# Patient Record
Sex: MALE | Race: WHITE | NOT HISPANIC OR LATINO | Employment: OTHER | URBAN - METROPOLITAN AREA
[De-identification: names, ages, dates, MRNs, and addresses within clinical notes are randomized per-mention and may not be internally consistent; named-entity substitution may affect disease eponyms.]

---

## 2018-01-15 ENCOUNTER — OFFICE VISIT (OUTPATIENT)
Dept: CARDIOLOGY | Facility: MEDICAL CENTER | Age: 58
End: 2018-01-15
Payer: MEDICARE

## 2018-01-15 VITALS
HEART RATE: 80 BPM | WEIGHT: 171 LBS | SYSTOLIC BLOOD PRESSURE: 154 MMHG | RESPIRATION RATE: 12 BRPM | BODY MASS INDEX: 25.91 KG/M2 | DIASTOLIC BLOOD PRESSURE: 80 MMHG | HEIGHT: 68 IN | OXYGEN SATURATION: 98 %

## 2018-01-15 DIAGNOSIS — Z72.0 TOBACCO USE: ICD-10-CM

## 2018-01-15 DIAGNOSIS — Z95.5 S/P CORONARY ARTERY STENT PLACEMENT: ICD-10-CM

## 2018-01-15 DIAGNOSIS — I73.9 PERIPHERAL VASCULAR DISEASE (HCC): ICD-10-CM

## 2018-01-15 DIAGNOSIS — M79.10 MYALGIA DUE TO STATIN: ICD-10-CM

## 2018-01-15 DIAGNOSIS — T46.6X5A MYALGIA DUE TO STATIN: ICD-10-CM

## 2018-01-15 DIAGNOSIS — E78.5 DYSLIPIDEMIA: ICD-10-CM

## 2018-01-15 DIAGNOSIS — R07.89 OTHER CHEST PAIN: ICD-10-CM

## 2018-01-15 DIAGNOSIS — R07.2 PRECORDIAL PAIN: ICD-10-CM

## 2018-01-15 DIAGNOSIS — Z78.9 STATIN INTOLERANCE: ICD-10-CM

## 2018-01-15 DIAGNOSIS — R42 LIGHTHEADED: ICD-10-CM

## 2018-01-15 DIAGNOSIS — I25.10 CORONARY ARTERY DISEASE, OCCLUSIVE: ICD-10-CM

## 2018-01-15 LAB — EKG IMPRESSION: NORMAL

## 2018-01-15 PROCEDURE — 93000 ELECTROCARDIOGRAM COMPLETE: CPT | Performed by: INTERNAL MEDICINE

## 2018-01-15 PROCEDURE — 99204 OFFICE O/P NEW MOD 45 MIN: CPT | Mod: 25 | Performed by: INTERNAL MEDICINE

## 2018-01-16 PROBLEM — Z72.0 TOBACCO USE: Status: ACTIVE | Noted: 2018-01-16

## 2018-01-16 PROBLEM — Z95.5 S/P CORONARY ARTERY STENT PLACEMENT: Status: ACTIVE | Noted: 2018-01-16

## 2018-01-16 PROBLEM — R07.2 PRECORDIAL PAIN: Status: ACTIVE | Noted: 2018-01-16

## 2018-01-16 PROBLEM — I73.9 PERIPHERAL VASCULAR DISEASE (HCC): Status: ACTIVE | Noted: 2018-01-16

## 2018-01-16 PROBLEM — T46.6X5A MYALGIA DUE TO STATIN: Status: ACTIVE | Noted: 2018-01-16

## 2018-01-16 PROBLEM — Z78.9 STATIN INTOLERANCE: Status: ACTIVE | Noted: 2018-01-16

## 2018-01-16 PROBLEM — M79.10 MYALGIA DUE TO STATIN: Status: ACTIVE | Noted: 2018-01-16

## 2018-01-16 PROBLEM — I25.10 CORONARY ARTERY DISEASE, OCCLUSIVE: Status: ACTIVE | Noted: 2018-01-16

## 2018-01-16 ASSESSMENT — ENCOUNTER SYMPTOMS
DIZZINESS: 1
DIAPHORESIS: 1
FEVER: 0
WEAKNESS: 1
EYE PAIN: 1
SHORTNESS OF BREATH: 0
PND: 0
BLURRED VISION: 1
ORTHOPNEA: 0
INSOMNIA: 0
PHOTOPHOBIA: 1
LOSS OF CONSCIOUSNESS: 0
CLAUDICATION: 1
MYALGIAS: 0
PALPITATIONS: 1
CHILLS: 0
DEPRESSION: 1
ABDOMINAL PAIN: 0

## 2018-01-16 NOTE — PROGRESS NOTES
"Subjective:   Eb Poon is a 57 y.o. male who presents today self-referred for ongoing management of coronary artery disease.    The patient has a complex cardiovascular history in addition to multiple medical problems including coronary artery disease, previous myocardial infarction, multiple repeated percutaneous coronary interventions with multiple stents involving the left anterior descending artery and dominant circumflex artery for both progressive native coronary artery disease and in-stent restenosis, dyslipidemia, intolerance to statins due to myalgias, chronic tobacco use, insulin-dependent diabetes mellitus, labile hypertension and peripheral vascular disease requiring multiple peripheral vascular intervention including bilateral common iliac artery stents and apparently infra inguinal interventions who has been under the care of Dr. Christian shea and Dr. Tejas Rushing at [facility] for his multiple coronary and peripheral vascular interventions.    The patient wished to change cardiologists for his ongoing care.    The patient most recent cardiac catheterization was performed on 10/9/2017 by Dr. Tejas GARSIA for \"class IV angina pectoris\" which demonstrated widely patent previous stents in the proximal to distal left anterior descending artery and proximal distal dominant circumflex artery, chronic total occlusion of the circumflex posterior descending artery filled by collateral circulation from the LAD septal branches and apical branches of the 2nd marginal branch,, a \"small heavily diseased 1st diagonal branch 95-99% ostial stenosis followed by an 80-90% proximal stenosis and a nondominant right coronary artery with distal 90% stenosis and a left ventricular ejection fraction of 45-50% with basal inferior wall hypokinesis.    Echocardiogram on 11/30/2017 showing a left ventricular ejection fraction of 45-50% with basal anterior wall hypokinesis and basal inferior wall " hypokinesis.    The patient reports that at his last clinic follow up with Dr. Rushing he complained of side effects from his medications and states that Ranexa, Coreg and atorvastatin were discontinued and he only continues on his insulin, aspirin and Plavix.    The patient complains of constant anterior precordial discomfort lasting hours.  No heart failure symptoms.  Has some claudication.    Continues active cigarette smoking despite repeated counseling by his previous cardiologist for smoking cessation.    Past Medical History:   Diagnosis Date   • Arrhythmia    • Arthritis    • CAD (coronary artery disease)    • DIABETES MELLITUS    • Dizziness    • Fatigue    • Gout    • Heart disease    • High cholesterol    • Hypertension    • Myocardial infarct    • Other and unspecified angina pectoris    • PAD (peripheral artery disease)    • Pain     back pain     Past Surgical History:   Procedure Laterality Date   • RECOVERY  2015    Procedure: VASCULAR CASE BYNUM-ABDOMINAL AORTOGRAM W/ RUNOFF, POSSIBLE INTERVENTION ICD 9: 433.10, 435.3, 443.9;  Surgeon: [facility];  Location: SURGERY PRE-POST [facility] UNIT [facility];  Service:    • CAROTID ENDARTERECTOMY Left 2015    Procedure: CAROTID ENDARTERECTOMY ;  Surgeon: Topher Bynum M.D.;  Location: SURGERY [facility];  Service:    • OTHER CARDIAC SURGERY      stent placement     No family history on file.  History   Smoking Status   • Current Every Day Smoker   • Packs/day: 0.50   • Years: 40.00   • Types: Cigarettes   Smokeless Tobacco   • Never Used     Allergies   Allergen Reactions   • Coreg [Carvedilol]      Made heart rate low and worsened lightedheadness.   • Ranexa [Ranolazine]      Dropped heart rate low and worsened lightheadedness.    • Statins [Hmg-Coa-R Inhibitors]      Muscle aches.     Outpatient Encounter Prescriptions as of 1/15/2018   Medication Sig Dispense Refill   • [] Evolocumab, REPATHA, 140 MG/ML Solution Auto-injector Inject  "1 Each as instructed Once for 1 dose. 1 Each 0   • clopidogrel (PLAVIX) 75 MG TABS Take 1 Tab by mouth every day. 30 Tab 3   • diazepam (VALIUM) 2 MG TABS Take 1 Tab by mouth every 6 hours as needed for Anxiety. (Patient taking differently: Take 1 mg by mouth as needed for Anxiety.) 30 Tab 0   • multivitamin (THERAGRAN) TABS Take 1 Tab by mouth every day.     • aspirin (ASA) 81 MG CHEW chewable tablet Take 81 mg by mouth every day. 180 Tab 3   • lisinopril (PRINIVIL) 10 MG TABS Take 1 Tab by mouth every day. (Patient not taking: Reported on 1/15/2018) 30 Tab 11   • metoprolol SR (TOPROL XL) 25 MG TB24 Take 1 Tab by mouth every day. (Patient not taking: Reported on 1/15/2018) 30 Tab 11   • atorvastatin (LIPITOR) 40 MG TABS Take 1 Tab by mouth every day. (Patient not taking: Reported on 1/15/2018) 30 Tab 11   • insulin glargine (LANTUS) 100 UNIT/ML SOLN Inject 15 Units as instructed every day. (Patient not taking: Reported on 1/15/2018) 10 mL 3     No facility-administered encounter medications on file as of 1/15/2018.      Review of Systems   Constitutional: Positive for diaphoresis and malaise/fatigue. Negative for chills and fever.   HENT: Negative for congestion.    Eyes: Positive for blurred vision, photophobia and pain.   Respiratory: Negative for shortness of breath.    Cardiovascular: Positive for chest pain, palpitations and claudication. Negative for orthopnea, leg swelling and PND.   Gastrointestinal: Negative for abdominal pain.   Genitourinary: Negative for dysuria.   Musculoskeletal: Negative for joint pain and myalgias.   Skin: Negative for rash.   Neurological: Positive for dizziness and weakness. Negative for loss of consciousness.   Psychiatric/Behavioral: Positive for depression. The patient does not have insomnia.         Objective:   /80   Pulse 80   Resp 12   Ht 1.727 m (5' 8\")   Wt 77.6 kg (171 lb)   SpO2 98%   BMI 26.00 kg/m²     Physical Exam   Constitutional: He is oriented to " person, place, and time. He appears well-nourished. No distress.   HENT:   Head: Normocephalic and atraumatic.   Eyes: Conjunctivae and EOM are normal. Pupils are equal, round, and reactive to light. No scleral icterus.   Neck: Carotid bruit is not present. No thyromegaly present.   Normal jugular venous pressure.  Left neck scar.   Cardiovascular: Normal rate, regular rhythm, S1 normal and S2 normal.  Exam reveals no gallop and no friction rub.    No murmur heard.  Pulses:       Carotid pulses are 2+ on the right side, and 2+ on the left side with bruit.       Radial pulses are 2+ on the right side, and 2+ on the left side.        Femoral pulses are 1+ on the right side with bruit, and 1+ on the left side.       Posterior tibial pulses are 1+ on the right side, and 1+ on the left side.       Pulmonary/Chest: Effort normal and breath sounds normal. He has no wheezes. He has no rhonchi. He has no rales.   Abdominal: Soft. Bowel sounds are normal. He exhibits no abdominal bruit, no pulsatile midline mass and no mass. There is no hepatosplenomegaly. There is no tenderness.   Musculoskeletal: He exhibits no edema.   Lymphadenopathy:     He has no cervical adenopathy.   Neurological: He is alert and oriented to person, place, and time. He has normal strength. Gait normal.   Skin: Skin is warm and dry. No cyanosis. Nails show no clubbing.   Psychiatric: He has a normal mood and affect. His behavior is normal.     01/15/2018 EKG: Normal sinus rhythm, rate 73. Within normal limits. Reviewed myself.    Recent chemistry panel and lipid panel reviewed and are significantly abnormal    Assessment:     1. Precordial pain     2. Coronary artery disease, occlusive     3. S/P coronary artery stent placement     4. Dyslipidemia     5. Peripheral vascular disease (CMS-HCC)     6. Statin intolerance     7. Myalgia due to statin     8. Tobacco use     9. Other chest pain  EKG   10. Lightheaded  EKG       Medical Decision Making:   Today's Assessment / Status / Plan:     Precordial chest pain. Nonischemic.    Coronary artery disease. Complex. Preserved left ventricular function without congestive heart failure    PCI's. Multiple involving the left anterior descending artery and dominant circumflex artery with known chronic total occlusion of the distal circumflex artery.    Dyslipidemia. Statin intolerance. Not a cold.    Myalgias secondary to statins.    Chronic tobacco use.    Peripheral vascular disease with multiple interventions.    Recommendation and Discussion  I had a lengthy and detailed discussion with the patient and his wife who is also present with regards to the significance of his current coronary artery disease and current clinical condition.  I counseled and indicated that it was imperative that he abstain from all smoking and offered Chantix which he refused.  I will order PCSK 9 therapy with Repatha ASAP and subsequently attempt to initiate low dose Crestor 5 mg daily in order to achieve maximum treatment for his hyperlipidemia.  It is also critical that has diabetes  His blood pressure is elevated today but he states that his blood pressure was always been 120.  He was instructed to keep a daily blood pressure log and bring it back at his next appointment.  It was agreed that he would restart metoprolol 25 mg ER daily.  I will investigate referral for cardiac rehabilitation.  Follow-up 3 weeks.  Spent 45 minutes in face-to-face patient contact in which greater than 50% of the visit was spent in counseling/coordination of care of medication management, symptom management, discussion of coronary disease and new medications in detail.

## 2018-01-18 DIAGNOSIS — E78.49 OTHER HYPERLIPIDEMIA: ICD-10-CM

## 2018-01-29 ENCOUNTER — TELEPHONE (OUTPATIENT)
Dept: CARDIOLOGY | Facility: MEDICAL CENTER | Age: 58
End: 2018-01-29

## 2018-01-29 DIAGNOSIS — R00.2 PALPITATIONS: ICD-10-CM

## 2018-01-29 NOTE — TELEPHONE ENCOUNTER
"Pt c/o chronic lightheadedness and heart rate issues that no one has been able to treat. He reports, skiped beats, palpitations and racing heart rate. Still thinks he's having lots of \"trigeminy\" and PVCs.  He is not taking metoprolol 25mg despite Dr. Gonzalez's recommendation due to his concern with already having a low heart rate (occasional HR 40's). Pt has been taking Ranexa which helps chest occasional pain.   Pt had 2 recent holter monitor at [facility]. We do not have these records. Contacted [facility] cardiology. They are sending these records to [phone].     Pt interested in seeing EP if Dr. Gonzalez recommends. He does have a f/u appt with SW 2/05.      Waiting for records.   "

## 2018-01-29 NOTE — TELEPHONE ENCOUNTER
----- Message from Sowmya Wright sent at 1/29/2018  9:00 AM PST -----  Regarding: Patient wants to get monitor results  TAMANNA/Po    Patient wants a call back to get his monitor results and can be reached at [phone].

## 2018-01-30 NOTE — TELEPHONE ENCOUNTER
S/W Pt Holter scanned in media. To  for review.  Reema OLGUIN RN     patient calling about holter study report   Received: Today   Message Contents   GURVINDER Morgan/Reema       Patient spoke to Po yesterday, and she had requested the holter study be faxed to her. Patient is calling to see if Dr. Gonzalez has seen the results yet. He can be reached at [phone].

## 2018-01-31 NOTE — TELEPHONE ENCOUNTER
"Informed pt.  He states he cannot make his appt on 2/05 with Dr. Gonzalez due to transportation issues from [place] and just wants to see an EP provider instead.  Recommended pt following up with Dr. Gonzalez regarding results and discuss an EP consult but pt declined and insisted on not having any additional appt d/t transportation and cost.     Also, pt asked for an updated prescription of metoprolol as Dr. Gonzalez recommended this at his last appt. Per note, Dr. Gonzalez \"agrees that he should restart 25mg metoprolol ER daily.\"      To SW: okay to refer to EP as pt insists? Okay to send in rx for metoprolol?         "

## 2018-01-31 NOTE — TELEPHONE ENCOUNTER
I did receive the echocardiogram and Holter monitor results.  Will review the results at the time of his appointment.

## 2018-02-02 RX ORDER — METOPROLOL SUCCINATE 25 MG/1
25 TABLET, EXTENDED RELEASE ORAL DAILY
Qty: 30 TAB | Refills: 0 | OUTPATIENT
Start: 2018-02-02 | End: 2018-02-13 | Stop reason: SINTOL

## 2018-02-02 NOTE — TELEPHONE ENCOUNTER
No indication for EP consultation.  Echocardiogram showed EF 45-50%.  Holter monitor showed predominantly isolated PVCs.  The patient has not been compliant with his medications.  Follow-up appointment reschedule to accommodate transportation opportunities for the patient.

## 2018-02-02 NOTE — TELEPHONE ENCOUNTER
Notified pt. He would like an appt in [place]. He says it is very difficult for him to get to [place] and would be better to establish in [place] closer to home. Pt scheduled with Dr. Thornton in [place] office next week. Refilled his metoprolol under Dr. Gonzalez as a 1 time fill for 30 day. Pt states he will be compliant with this until he sees Dr. Thornton.

## 2018-02-07 ENCOUNTER — OFFICE VISIT (OUTPATIENT)
Dept: CARDIOLOGY | Facility: PHYSICIAN GROUP | Age: 58
End: 2018-02-07
Payer: MEDICARE

## 2018-02-07 ENCOUNTER — TELEPHONE (OUTPATIENT)
Dept: CARDIOLOGY | Facility: MEDICAL CENTER | Age: 58
End: 2018-02-07

## 2018-02-07 VITALS
WEIGHT: 174 LBS | OXYGEN SATURATION: 97 % | BODY MASS INDEX: 26.37 KG/M2 | HEART RATE: 88 BPM | HEIGHT: 68 IN | SYSTOLIC BLOOD PRESSURE: 148 MMHG | DIASTOLIC BLOOD PRESSURE: 80 MMHG

## 2018-02-07 DIAGNOSIS — I25.118 CORONARY ARTERY DISEASE OF NATIVE ARTERY OF NATIVE HEART WITH STABLE ANGINA PECTORIS (HCC): Primary | ICD-10-CM

## 2018-02-07 DIAGNOSIS — E78.5 DYSLIPIDEMIA: ICD-10-CM

## 2018-02-07 DIAGNOSIS — Z72.0 TOBACCO USE: ICD-10-CM

## 2018-02-07 DIAGNOSIS — Z95.5 S/P CORONARY ARTERY STENT PLACEMENT: ICD-10-CM

## 2018-02-07 DIAGNOSIS — I65.23 BILATERAL CAROTID ARTERY STENOSIS: ICD-10-CM

## 2018-02-07 DIAGNOSIS — I95.1 ORTHOSTATIC HYPOTENSION: ICD-10-CM

## 2018-02-07 DIAGNOSIS — R07.89 OTHER CHEST PAIN: ICD-10-CM

## 2018-02-07 DIAGNOSIS — I20.89 EFFORT ANGINA: ICD-10-CM

## 2018-02-07 DIAGNOSIS — Z78.9 STATIN INTOLERANCE: ICD-10-CM

## 2018-02-07 DIAGNOSIS — Z98.890 H/O CAROTID ENDARTERECTOMY: ICD-10-CM

## 2018-02-07 DIAGNOSIS — I65.22 LEFT CAROTID ARTERY OCCLUSION: ICD-10-CM

## 2018-02-07 DIAGNOSIS — I73.9 PERIPHERAL ARTERY DISEASE (HCC): ICD-10-CM

## 2018-02-07 PROCEDURE — 99215 OFFICE O/P EST HI 40 MIN: CPT | Performed by: INTERNAL MEDICINE

## 2018-02-07 RX ORDER — MAGNESIUM OXIDE 400 MG/1
400 TABLET ORAL
Status: ON HOLD | COMMUNITY
End: 2022-07-14

## 2018-02-07 RX ORDER — CHLORAL HYDRATE 500 MG
CAPSULE ORAL
COMMUNITY
End: 2020-06-19

## 2018-02-07 RX ORDER — ISOSORBIDE MONONITRATE 30 MG/1
30 TABLET, EXTENDED RELEASE ORAL EVERY MORNING
Qty: 30 TAB | Refills: 11 | Status: SHIPPED | OUTPATIENT
Start: 2018-02-07 | End: 2018-03-27

## 2018-02-07 RX ORDER — NITROGLYCERIN 0.4 MG/1
0.4 TABLET SUBLINGUAL PRN
Qty: 25 TAB | Refills: 11 | Status: ON HOLD | OUTPATIENT
Start: 2018-02-07 | End: 2022-07-14

## 2018-02-07 RX ORDER — VITAMIN B COMPLEX
TABLET ORAL
COMMUNITY
End: 2020-06-19

## 2018-02-07 ASSESSMENT — ENCOUNTER SYMPTOMS
CLAUDICATION: 1
PHOTOPHOBIA: 1
BLURRED VISION: 1
PND: 0
DIZZINESS: 1
CHILLS: 0
DEPRESSION: 1
PALPITATIONS: 1
WEAKNESS: 1
EYE PAIN: 1
ORTHOPNEA: 0

## 2018-02-07 NOTE — PROGRESS NOTES
Subjective:   Eb Poon is a 58 -year-old man with coronary artery disease, and aggressive progressive disease requiring repeat stenting, statin intolerance, nicotine dependence, insulin-dependent diabetes, labile hypertension, and peripheral artery disease (previous iliac stents).    He describes to me today chest pain that is atypical for angina as per previous descriptions. It is nonexertional, and last typically 30 seconds up to 2-3 hours. It often occurs at 3-4 PM, and he can identify no specific palliating or provoking factors notably not palliated nor provoked by food, exercise, or position. In the past, he has tried ranolazine for this without significant relief. He has had many many stents in his LAD and circumflex coronary arteries, and evaluation by various cardiologists related to this over the years.    He tells me that his blood pressures at home range in the 120s-130s mmHg systolic, and 70s mmHg diastolic fairly consistently.    He is significantly limited by lightheadedness, which is orthostatic in nature. He has checked orthostatic vital signs on himself at home, and they are not consistent with orthostatic hypotension. He has had evaluation by neurology, and as below has significant carotid disease.    Past Medical History:   Diagnosis Date   • Arrhythmia    • Arthritis    • CAD (coronary artery disease)    • DIABETES MELLITUS    • Dizziness    • Fatigue    • Gout    • Heart disease    • High cholesterol    • Hypertension    • Myocardial infarct 2007   • Other and unspecified angina pectoris    • PAD (peripheral artery disease)    • Pain     back pain     Past Surgical History:   Procedure Laterality Date   • RECOVERY  6/25/2015    Procedure: VASCULAR CASE MCDONALD-ABDOMINAL AORTOGRAM W/ RUNOFF, POSSIBLE INTERVENTION ICD 9: 433.10, 435.3, 443.9;  Surgeon: [facility];  Location: SURGERY PRE-POST PROC UNIT [facility] [place];  Service:    • CAROTID ENDARTERECTOMY Left 5/20/2015    Procedure:  CAROTID ENDARTERECTOMY ;  Surgeon: Topher Bynum M.D.;  Location: SURGERY [facility];  Service:    • OTHER CARDIAC SURGERY      stent placement     History reviewed. No pertinent family history.  History   Smoking Status   • Current Every Day Smoker   • Packs/day: 0.50   • Years: 40.00   • Types: Cigarettes   Smokeless Tobacco   • Never Used     Allergies   Allergen Reactions   • Coreg [Carvedilol]      Made heart rate low and worsened lightedheadness.   • Ranexa [Ranolazine]      Dropped heart rate low and worsened lightheadedness.    • Statins [Hmg-Coa-R Inhibitors]      Muscle aches.     Outpatient Encounter Prescriptions as of 2/7/2018   Medication Sig Dispense Refill   • magnesium oxide (MAG-OX) 400 MG Tab Take 400 mg by mouth every day.     • B Complex Vitamins (VITAMIN-B COMPLEX PO) Take  by mouth.     • Coenzyme Q10 (COQ10) 100 MG Cap Take  by mouth.     • Omega-3 1000 MG Cap Take  by mouth.     • isosorbide mononitrate SR (IMDUR) 30 MG TABLET SR 24 HR Take 1 Tab by mouth every morning. 30 Tab 11   • clopidogrel (PLAVIX) 75 MG TABS Take 1 Tab by mouth every day. 30 Tab 3   • diazepam (VALIUM) 2 MG TABS Take 1 Tab by mouth every 6 hours as needed for Anxiety. (Patient taking differently: Take 1 mg by mouth as needed for Anxiety.) 30 Tab 0   • multivitamin (THERAGRAN) TABS Take 1 Tab by mouth every day.     • aspirin (ASA) 81 MG CHEW chewable tablet Take 81 mg by mouth every day. 180 Tab 3   • insulin glargine (LANTUS) 100 UNIT/ML SOLN Inject 15 Units as instructed every day. 10 mL 3   • metoprolol SR (TOPROL XL) 25 MG TABLET SR 24 HR Take 1 Tab by mouth every day. 30 Tab 0   • [DISCONTINUED] Evolocumab, REPATHA, 140 MG/ML Solution Auto-injector Inject 1 Each as instructed every 14 days. 6 PEN 3   • [DISCONTINUED] lisinopril (PRINIVIL) 10 MG TABS Take 1 Tab by mouth every day. 30 Tab 11   • [DISCONTINUED] atorvastatin (LIPITOR) 40 MG TABS Take 1 Tab by mouth every day. (Patient not taking: Reported on  "1/15/2018) 30 Tab 11     No facility-administered encounter medications on file as of 2/7/2018.      Review of Systems   Constitutional: Positive for malaise/fatigue. Negative for chills.   Eyes: Positive for blurred vision, photophobia and pain.   Cardiovascular: Positive for chest pain, palpitations and claudication. Negative for orthopnea, leg swelling and PND.   Neurological: Positive for dizziness and weakness.   Psychiatric/Behavioral: Positive for depression.   All other systems reviewed and are negative.       Objective:   /80   Pulse 88   Ht 1.727 m (5' 8\")   Wt 78.9 kg (174 lb)   SpO2 97%   BMI 26.46 kg/m²     Physical Exam   Constitutional: He is oriented to person, place, and time. He appears well-developed and well-nourished. No distress.   Mildly anxious middle-aged man in no distress. He comes in alone to his appointment today.   HENT:   Head: Normocephalic and atraumatic.   Eyes: Conjunctivae and EOM are normal. Pupils are equal, round, and reactive to light. No scleral icterus.   Neck: Neck supple. No JVD present. No tracheal deviation present.   Cardiovascular: Normal rate, regular rhythm, normal heart sounds and intact distal pulses.  Exam reveals no gallop and no friction rub.    No murmur heard.  Pulses:       Dorsalis pedis pulses are 1+ on the right side, and 1+ on the left side.   Soft bilateral carotid bruits   Pulmonary/Chest: Effort normal and breath sounds normal. No stridor. No respiratory distress. He has no wheezes. He has no rales.   Abdominal: Soft. Bowel sounds are normal. He exhibits no distension.   Musculoskeletal: He exhibits no edema.   Neurological: He is alert and oriented to person, place, and time.   Skin: Skin is warm and dry. No rash noted. He is not diaphoretic. No erythema. No pallor.   Psychiatric: He has a normal mood and affect. Judgment and thought content normal.   Vitals reviewed.    Lab Results   Component Value Date/Time    WBC 5.4 05/20/2015 03:10 PM " "   RBC 5.44 05/20/2015 03:10 PM    HEMOGLOBIN 16.5 05/20/2015 03:10 PM    HEMATOCRIT 48.4 05/20/2015 03:10 PM    MCV 89.0 05/20/2015 03:10 PM    MCH 30.3 05/20/2015 03:10 PM    MCHC 34.1 05/20/2015 03:10 PM    MPV 9.2 05/20/2015 03:10 PM        Lab Results   Component Value Date/Time    SODIUM 136 06/25/2015 11:30 AM    POTASSIUM 4.5 06/25/2015 11:30 AM    CHLORIDE 106 06/25/2015 11:30 AM    CO2 24 06/25/2015 11:30 AM    GLUCOSE 132 (H) 06/25/2015 11:30 AM    BUN 21 06/25/2015 11:30 AM    CREATININE 1.08 06/25/2015 11:30 AM        Lab Results   Component Value Date/Time    ASTSGOT 10 (L) 06/25/2015 11:30 AM    ALTSGPT 10 06/25/2015 11:30 AM        No results found for: CHOLSTRLTOT, LDL, HDL, TRIGLYCERIDE      Recent imaging:  \"The patient most recent cardiac catheterization was performed on 10/9/2017 by Dr. Lazaro [Ho] for \"class IV angina pectoris\" which demonstrated widely patent previous stents in the proximal to distal left anterior descending artery and proximal distal dominant circumflex artery, chronic total occlusion of the circumflex posterior descending artery filled by collateral circulation from the LAD septal branches and apical branches of the 2nd marginal branch,, a \"small heavily diseased 1st diagonal branch 95-99% ostial stenosis followed by an 80-90% proximal stenosis and a nondominant right coronary artery with distal 90% stenosis and a left ventricular ejection fraction of 45-50% with basal inferior wall hypokinesis.     Echocardiogram on 11/30/2017 showing a left ventricular ejection fraction of 45-50% with basal anterior wall hypokinesis and basal inferior wall hypokinesis\"    Assessment:     1. Coronary artery disease of native artery of native heart with stable angina pectoris (CMS-HCC)  isosorbide mononitrate SR (IMDUR) 30 MG TABLET SR 24 HR    REFERRAL TO INTENSIVE CARDIAC REHAB/CARDIAC REHAB   2. Effort angina (CMS-HCC)  isosorbide mononitrate SR (IMDUR) 30 MG TABLET SR 24 HR    REFERRAL " TO INTENSIVE CARDIAC REHAB/CARDIAC REHAB   3. S/P coronary artery stent placement     4. Statin intolerance     5. Tobacco use     6. Other chest pain     7. Orthostatic hypotension     8. Dyslipidemia     9. Bilateral carotid artery stenosis     10. Left carotid artery occlusion     11. H/O carotid endarterectomy     12. Peripheral artery disease (CMS-[facility])         Medical Decision Making:  Today's Assessment / Status / Plan:     He certainly has very complex vascular disease that is aggressive and for which she has been unable to tolerate therapy mostly. At this point, I prescribed him Imdur for his chest discomfort though I introduced the idea to him again that this very well could be completely unrelated to his multivessel coronary artery disease. I highly recommended that he participate in cardiac rehabilitation. I discussed with him alternative therapies for his dyslipidemia, namely PCSK-9 inhibitors. I have referred him to our lipid clinic to further discuss the best way to obtain those medicines at affordable price or at least get his LDL below 70 mg/dL.    Alejandro Thornton MD  Cardiologist, [facility] [place]     Return in about 2 months (around 4/7/2018).    I spent 60 minutes with . Eb Poon, over fifty percent was spent counseling the patient on their condition, best management practices, reviewing test results, risks and benefits of treatment and coordinating care.

## 2018-02-07 NOTE — LETTER
Name:          Eb Poon   YOB: 1960  Date:     2/7/2018      Stuart Moreno M.D.  [address]  [facility] [address]     Alejandro Thornton MD  [address], [address]  [place] [address]  Phone: [phone]  Back Line: [phone]  Fax: [phone]  E-mail: [email or web address]   Dear Dr. Moreno,    We had the pleasure of seeing your patient, Eb Poon, in Cardiology Clinic at [facility] and V[facility].    As you know, he is a 58-year-old man with coronary artery disease, and aggressive progressive disease requiring repeat stenting, statin intolerance, nicotine dependence, insulin-dependent diabetes, labile hypertension, and peripheral artery disease (previous iliac stents).    He certainly has very complex vascular disease that is aggressive and for which she has been unable to tolerate therapy mostly. At this point, I prescribed him Imdur for his chest discomfort though I introduced the idea to him again that this very well could be completely unrelated to his multivessel coronary artery disease. I highly recommended that he participate in cardiac rehabilitation. I discussed with him alternative therapies for his dyslipidemia, namely PCSK-9 inhibitors. I have referred him to our lipid clinic to further discuss the best way to obtain those medicines at affordable price or at least get his LDL below 70 mg/dL.    Return in about 2 months (around 4/7/2018).    Thank you for the referral and please do not hesitate to contact me at any time. My contact information is listed above.    This note was dictated using Dragon speech recognition software.     A full note including my physical examination and a full list of rectified medications is available in our medical record, and can be faxed as well.    Alejandro Thornton MD  Cardiologist  [facility]

## 2018-02-08 ENCOUNTER — TELEPHONE (OUTPATIENT)
Dept: CARDIOLOGY | Facility: MEDICAL CENTER | Age: 58
End: 2018-02-08

## 2018-02-08 NOTE — TELEPHONE ENCOUNTER
"Gabriele Bingham - Referral to Lipid clinic << Less Detail',event)\" href=\"javascript:;\">More Detail >>      Referral to Lipid clinic   Gabriele Bingham   Sent: Thu February 08, 2018  2:43 PM   To: Maira Guzman R.N.                  Message     The referral to the Lipid Clinic has been sent to the offices of Dr. Michael Bloch.   If the patient does not hear back from them in a timely manner, they should call [phone]     ================================================================================    Called pt, discussed above information, pt reports he is currently not interested on seeing Dr Bloch, informed pt that per Dr Thornton:  I have referred him to our lipid clinic to further discuss the best way to obtain those medicines at affordable price or at least get his LDL below 70 mg/dL. Pt stated. \"Well I'm not going to Pay $50 copay to see Dr Bloch to give me $5 discount for the meds.\"    FYI to Dr Thornton                 "

## 2018-02-08 NOTE — TELEPHONE ENCOUNTER
Noted       referral to cardiac rehab   Danny Callejas Ass't   Sent: Wed February 07, 2018  1:27 PM   To: Mitra Jeong R.N.                  Message     REFERRAL TO INTENSIVE CARDIAC REHAB was sent to [facility].  If the patient hasn't been contacted in a timely manner, have them call [phone]

## 2018-02-12 NOTE — TELEPHONE ENCOUNTER
Fair enough, please tell the patient that I will discuss that further with him in the future at our next appointment.    KERI DAWSON

## 2018-02-13 ENCOUNTER — TELEPHONE (OUTPATIENT)
Dept: CARDIOLOGY | Facility: MEDICAL CENTER | Age: 58
End: 2018-02-13

## 2018-02-13 NOTE — TELEPHONE ENCOUNTER
Noted.    I had prescribed him Imdur for the treatment of his angina. Metoprolol is generally effective for that. If he cannot take metoprolol because of side effects, then I would recommend that he work judiciously at cardiac rehabilitation.    KERI DAWSON

## 2018-02-13 NOTE — TELEPHONE ENCOUNTER
Pt called, pt reports that the Metoprolol is making his lightheadedness worse and he fells like it is making his blodd sugar elevated, pt reports that he had a history that he is not tolerating beta blocker before, pt reports his BP ranges 140-150s/80s HR after Metoprolol is usually 45-50s, pt reports he stopped taking it 2-3 days ago and lightheadedness is already improved.     To Dr Thornton for advice

## 2018-02-14 NOTE — TELEPHONE ENCOUNTER
Called pt, discussed Dr Thornton recommendations, pt agreed and verbalizes understanding.    MAR updated.

## 2018-03-27 ENCOUNTER — HOSPITAL ENCOUNTER (OUTPATIENT)
Dept: LAB | Facility: MEDICAL CENTER | Age: 58
End: 2018-03-27
Attending: FAMILY MEDICINE
Payer: MEDICARE

## 2018-03-27 ENCOUNTER — OFFICE VISIT (OUTPATIENT)
Dept: MEDICAL GROUP | Facility: PHYSICIAN GROUP | Age: 58
End: 2018-03-27
Payer: MEDICARE

## 2018-03-27 VITALS
TEMPERATURE: 98.2 F | HEART RATE: 82 BPM | RESPIRATION RATE: 16 BRPM | HEIGHT: 68 IN | DIASTOLIC BLOOD PRESSURE: 84 MMHG | SYSTOLIC BLOOD PRESSURE: 142 MMHG | OXYGEN SATURATION: 100 % | WEIGHT: 177.9 LBS | BODY MASS INDEX: 26.96 KG/M2

## 2018-03-27 DIAGNOSIS — I73.9 PERIPHERAL ARTERY DISEASE (HCC): ICD-10-CM

## 2018-03-27 DIAGNOSIS — R41.89 COGNITIVE CHANGE: ICD-10-CM

## 2018-03-27 DIAGNOSIS — R41.89 COGNITIVE CHANGE: Primary | ICD-10-CM

## 2018-03-27 DIAGNOSIS — R68.82 LOW LIBIDO: ICD-10-CM

## 2018-03-27 PROBLEM — E11.319 DIABETIC RETINOPATHY OF LEFT EYE ASSOCIATED WITH TYPE 2 DIABETES MELLITUS (HCC): Status: ACTIVE | Noted: 2018-03-27

## 2018-03-27 LAB
TESTOST SERPL-MCNC: 210 NG/DL (ref 175–781)
TSH SERPL DL<=0.005 MIU/L-ACNC: 2.04 UIU/ML (ref 0.38–5.33)

## 2018-03-27 PROCEDURE — 84403 ASSAY OF TOTAL TESTOSTERONE: CPT

## 2018-03-27 PROCEDURE — 99203 OFFICE O/P NEW LOW 30 MIN: CPT | Performed by: FAMILY MEDICINE

## 2018-03-27 PROCEDURE — 84443 ASSAY THYROID STIM HORMONE: CPT

## 2018-03-27 PROCEDURE — 36415 COLL VENOUS BLD VENIPUNCTURE: CPT

## 2018-03-27 NOTE — ASSESSMENT & PLAN NOTE
"58-year-old patient comes in today complaining of a five to six-year history of thinking \"something is not quite right\" in the functioning of his brain. As a very complex history of cardiovascular and vascular disease. He is on disability for his cardiac problem. He was previously followed at [facility] by Dr. Josh Davidson. Has been seen by a local neurologist told him he might be suffering from antiphospholipid  syndrome. He also sees Dr. Alvarado here in [place] for alternative medicine. The latter has prescribed testosterone supplementation off and on in the past. Patient denies any depression or anxiety. He is able to concentrate without difficulty, or difficulty in managing his affairs. Minor memory issues. No fatigue. He is pretty idle at home. He gets up late. Does not have much of a daily routine. He says the Valium takes off the edge and enables him to focus better. He says he's never been on a SSRI or similar drug in the past.  "

## 2018-03-27 NOTE — ASSESSMENT & PLAN NOTE
Patient reports a long history of decreased libido. His been on testosterone supplementation past but not currently. No testosterone level available.

## 2018-04-10 ENCOUNTER — OFFICE VISIT (OUTPATIENT)
Dept: MEDICAL GROUP | Facility: PHYSICIAN GROUP | Age: 58
End: 2018-04-10
Payer: MEDICARE

## 2018-04-10 VITALS
SYSTOLIC BLOOD PRESSURE: 126 MMHG | HEIGHT: 68 IN | TEMPERATURE: 98.1 F | BODY MASS INDEX: 26.61 KG/M2 | DIASTOLIC BLOOD PRESSURE: 70 MMHG | RESPIRATION RATE: 18 BRPM | WEIGHT: 175.6 LBS | HEART RATE: 82 BPM | OXYGEN SATURATION: 98 %

## 2018-04-10 DIAGNOSIS — B35.3 TINEA PEDIS OF BOTH FEET: ICD-10-CM

## 2018-04-10 DIAGNOSIS — R42 LIGHTHEADEDNESS: ICD-10-CM

## 2018-04-10 DIAGNOSIS — E11.42 TYPE 2 DIABETES MELLITUS WITH DIABETIC POLYNEUROPATHY, WITHOUT LONG-TERM CURRENT USE OF INSULIN (HCC): ICD-10-CM

## 2018-04-10 DIAGNOSIS — R41.89 COGNITIVE CHANGE: ICD-10-CM

## 2018-04-10 DIAGNOSIS — I73.9 PERIPHERAL VASCULAR DISEASE (HCC): ICD-10-CM

## 2018-04-10 PROBLEM — Z79.899 CHRONICALLY ON BENZODIAZEPINE THERAPY: Status: ACTIVE | Noted: 2018-04-10

## 2018-04-10 PROBLEM — R07.2 PRECORDIAL PAIN: Status: RESOLVED | Noted: 2018-01-16 | Resolved: 2018-04-10

## 2018-04-10 LAB
HBA1C MFR BLD: 6.6 % (ref ?–5.8)
INT CON NEG: NEGATIVE
INT CON POS: POSITIVE

## 2018-04-10 PROCEDURE — 99214 OFFICE O/P EST MOD 30 MIN: CPT | Performed by: FAMILY MEDICINE

## 2018-04-10 PROCEDURE — 83036 HEMOGLOBIN GLYCOSYLATED A1C: CPT | Performed by: FAMILY MEDICINE

## 2018-04-10 RX ORDER — NYSTATIN 100000 U/G
CREAM TOPICAL
Qty: 1 TUBE | Refills: 2 | Status: SHIPPED
Start: 2018-04-10 | End: 2020-06-19

## 2018-04-10 RX ORDER — LISINOPRIL 2.5 MG/1
2.5 TABLET ORAL DAILY
COMMUNITY
End: 2022-07-13

## 2018-04-10 ASSESSMENT — PATIENT HEALTH QUESTIONNAIRE - PHQ9: CLINICAL INTERPRETATION OF PHQ2 SCORE: 0

## 2018-04-10 NOTE — ASSESSMENT & PLAN NOTE
Patient reports some tingling numbness in both feet. Blood sugars running low to mid 100s. Has not had an HbA1c in some time.

## 2018-04-10 NOTE — ASSESSMENT & PLAN NOTE
Still has problems focusing after any kind of exertion. Takes half a Valium 2 mg twice a day. Never tried an SSRI. Denies any jonah anxiety.

## 2018-04-10 NOTE — PROGRESS NOTES
Complaint: Follow-up test results        Subjective:   Eb Poon is a 58 y.o. male here today for follow-up of his lab tests. His TSH was 2.04 is well within normal limits and his serum testosterone level was 210 which is on the low normal side.      Cognitive change  Still has problems focusing after any kind of exertion. Takes half a Valium 2 mg twice a day. Never tried an SSRI. Denies any jonah anxiety. He had a sleep study in the past which was within normal limits. Denies having any low blood sugar spells when he is having these problems with focusing. Problems with losing his cool sometimes.    Diabetes mellitus with neurological manifestations (CMS-HCC)  Patient reports some tingling numbness in both feet. Blood sugars running low to mid 100s. Has not had an HbA1c done in some time.    Tinea pedis of both feet  Long history of fungal infection of both feet. Not applying any kind of cream.    Lightheadedness  Still having some lightheadedness picking over sitting or standing up, oriented when turning his head. I see jonah dizziness or difficulty focusing. He has had a full workup.     Peripheral artery disease    Patient was questioned about pain in his legs as well as claudication. He denies both. Denies any symptoms of stroke or TIA. States that he has not seen his vascular surgeon since his surgeries.    Current medicines (including changes today)  Current Outpatient Prescriptions   Medication Sig Dispense Refill   • nystatin (MYCOSTATIN) 880651 UNIT/GM Cream topical cream Apply to feet twice a day 1 Tube 2   • lisinopril (PRINIVIL) 2.5 MG Tab Take 2.5 mg by mouth every day.     • sertraline (ZOLOFT) 50 MG Tab Take 1 Tab by mouth every day. 30 Tab 11   • magnesium oxide (MAG-OX) 400 MG Tab Take 400 mg by mouth every day.     • B Complex Vitamins (VITAMIN-B COMPLEX PO) Take  by mouth.     • Coenzyme Q10 (COQ10) 100 MG Cap Take  by mouth.     • Omega-3 1000 MG Cap Take  by mouth.     •  clopidogrel (PLAVIX) 75 MG TABS Take 1 Tab by mouth every day. 30 Tab 3   • diazepam (VALIUM) 2 MG TABS Take 1 Tab by mouth every 6 hours as needed for Anxiety. (Patient taking differently: Take 1 mg by mouth as needed for Anxiety.) 30 Tab 0   • multivitamin (THERAGRAN) TABS Take 1 Tab by mouth every day.     • aspirin (ASA) 81 MG CHEW chewable tablet Take 81 mg by mouth every day. 180 Tab 3   • insulin glargine (LANTUS) 100 UNIT/ML SOLN Inject 15 Units as instructed every day. 10 mL 3   • nitroglycerin (NITROSTAT) 0.4 MG SL Tab Place 1 Tab under tongue as needed for Chest Pain. 25 Tab 11     No current facility-administered medications for this visit.      He  has a past medical history of Arrhythmia; Arthritis; CAD (coronary artery disease); DIABETES MELLITUS; Dizziness; Fatigue; Gout; Heart disease; High cholesterol; Hypertension; Myocardial infarct (2007); Other and unspecified angina pectoris; PAD (peripheral artery disease); and Pain.     Health Maintenance: need recors from previous PCP to check      Allergies: Coreg [carvedilol]; Ranexa [ranolazine]; and Statins [hmg-coa-r inhibitors]    Current Outpatient Prescriptions Ordered in [facility]   Medication Sig Dispense Refill   • nystatin (MYCOSTATIN) 512334 UNIT/GM Cream topical cream Apply to feet twice a day 1 Tube 2   • lisinopril (PRINIVIL) 2.5 MG Tab Take 2.5 mg by mouth every day.     • sertraline (ZOLOFT) 50 MG Tab Take 1 Tab by mouth every day. 30 Tab 11   • magnesium oxide (MAG-OX) 400 MG Tab Take 400 mg by mouth every day.     • B Complex Vitamins (VITAMIN-B COMPLEX PO) Take  by mouth.     • Coenzyme Q10 (COQ10) 100 MG Cap Take  by mouth.     • Omega-3 1000 MG Cap Take  by mouth.     • clopidogrel (PLAVIX) 75 MG TABS Take 1 Tab by mouth every day. 30 Tab 3   • diazepam (VALIUM) 2 MG TABS Take 1 Tab by mouth every 6 hours as needed for Anxiety. (Patient taking differently: Take 1 mg by mouth as needed for Anxiety.) 30 Tab 0   • multivitamin (THERAGRAN)  TABS Take 1 Tab by mouth every day.     • aspirin (ASA) 81 MG CHEW chewable tablet Take 81 mg by mouth every day. 180 Tab 3   • insulin glargine (LANTUS) 100 UNIT/ML SOLN Inject 15 Units as instructed every day. 10 mL 3   • nitroglycerin (NITROSTAT) 0.4 MG SL Tab Place 1 Tab under tongue as needed for Chest Pain. 25 Tab 11     No current [facility]-ordered facility-administered medications on file.        Past Medical History:   Diagnosis Date   • Arrhythmia    • Arthritis    • CAD (coronary artery disease)    • DIABETES MELLITUS    • Dizziness    • Fatigue    • Gout    • Heart disease    • High cholesterol    • Hypertension    • Myocardial infarct 2007   • Other and unspecified angina pectoris    • PAD (peripheral artery disease)    • Pain     back pain       Past Surgical History:   Procedure Laterality Date   • RECOVERY  6/25/2015    Procedure: VASCULAR CASE BYNUM-ABDOMINAL AORTOGRAM W/ RUNOFF, POSSIBLE INTERVENTION ICD 9: 433.10, 435.3, 443.9;  Surgeon: [facility];  Location[facility]GERY [facility];  Service:    • CAROTID ENDARTERECTOMY Left 5/20/2015    Procedure: CAROTID ENDARTERECTOMY ;  Surgeon: Topher Bynum M.D.;  Location: SURGERY [facility];  Service:    • OTHER CARDIAC SURGERY      stent placement       Social History   Substance Use Topics   • Smoking status: Current Every Day Smoker     Packs/day: 0.50     Years: 40.00     Types: Pipe   • Smokeless tobacco: Never Used   • Alcohol use No      Comment: quit 2.5 years ago       Social History     Social History Narrative   • No narrative on file       History reviewed. No pertinent family history.      ROS   Patient denies any stroke symptoms, dizziness, headache, nasal congestion, sore-throat, cough, heartburn, chest pain, difficulty breathing, abdominal discomfort, diarrhea/constipation, joint or back pain, skin rashes, depression or anxiety.       Objective:     Blood pressure 126/70, pulse 82, temperature 36.7 °C (98.1 °F), resp.  "rate 18, height 1.727 m (5' 8\"), weight 79.7 kg (175 lb 9.6 oz), SpO2 98 %. Body mass index is 26.7 kg/m².   Physical Exam:  Constitutional: Alert, no distress.  Skin: Warm, dry, good turgor, no rashes in visible areas.  Feet: skin dry, no sign infection noted. Nails thickened, discolored, dysmorphic.  Psych: Alert and oriented x3, appropriate affect and mood.        Assessment and Plan:   The following treatment plan was discussed    1. Type 2 diabetes mellitus with diabetic polyneuropathy, without long-term current use of insulin (CMS-[facility])  Chronic problem, controlled. HbA1c 6.6 today. Change in Lantus dose needed. Patient to continue continue to monitor his blood sugars when he is feeling well. Have his wife trim his nails.   - POCT Hemoglobin A1C    2. Cognitive change  Chronic problem, sounds more-behavioral issue with cognitive add-on. Discussed options. Patient is agreeable to trial of sertraline 50 mg at bedtime. Reviewed possible side effects. We'll reassess in one month.  3. Tinea pedis of both feet  Chronic problem, requiring intervention. We'll start him back on nystatin cream twice a day. Recommend he check his daily symptoms before he applies cream.  - nystatin (MYCOSTATIN) 822316 UNIT/GM Cream topical cream; Apply to feet twice a day  Dispense: 1 Tube; Refill: 2    4. Lightheadedness  Chronic problem, unstable. Told patient I doubt that this problem could be resolved. Recommend he not get up quickly. Should maintain himself well-hydrated.    5. Peripheral artery disease   Chronic problem, currently well-controlled on current medical management.        Followup: Return in about 4 weeks (around 5/8/2018) for Short.           "

## 2018-04-10 NOTE — ASSESSMENT & PLAN NOTE
Still having some lightheadedness picking over sitting or standing up, oriented when turning his head. I see jonah dizziness or difficulty focusing. He has had a full workup.

## 2018-05-08 ENCOUNTER — TELEPHONE (OUTPATIENT)
Dept: MEDICAL GROUP | Facility: PHYSICIAN GROUP | Age: 58
End: 2018-05-08

## 2018-05-08 DIAGNOSIS — R68.82 LOW LIBIDO: ICD-10-CM

## 2019-06-21 ENCOUNTER — HOSPITAL ENCOUNTER (OUTPATIENT)
Dept: RADIOLOGY | Facility: MEDICAL CENTER | Age: 59
End: 2019-06-21

## 2019-07-15 ENCOUNTER — APPOINTMENT (OUTPATIENT)
Dept: RADIOLOGY | Facility: MEDICAL CENTER | Age: 59
End: 2019-07-15
Attending: SURGERY
Payer: MEDICARE

## 2019-07-15 ENCOUNTER — HOSPITAL ENCOUNTER (OUTPATIENT)
Facility: MEDICAL CENTER | Age: 59
End: 2019-07-15
Attending: SURGERY | Admitting: SURGERY
Payer: MEDICARE

## 2019-07-15 VITALS
BODY MASS INDEX: 26.03 KG/M2 | HEIGHT: 68 IN | SYSTOLIC BLOOD PRESSURE: 132 MMHG | OXYGEN SATURATION: 99 % | RESPIRATION RATE: 19 BRPM | TEMPERATURE: 97.6 F | WEIGHT: 171.74 LBS | HEART RATE: 80 BPM | DIASTOLIC BLOOD PRESSURE: 64 MMHG

## 2019-07-15 DIAGNOSIS — I70.209 ATHEROSCLEROSIS OF ARTERIES OF EXTREMITIES (HCC): ICD-10-CM

## 2019-07-15 DIAGNOSIS — I77.0 ARTERIOVENOUS FISTULA, ACQUIRED (HCC): ICD-10-CM

## 2019-07-15 DIAGNOSIS — I73.9 PERIPHERAL VASCULAR DISEASE, UNSPECIFIED (HCC): ICD-10-CM

## 2019-07-15 DIAGNOSIS — Z87.891 PERSONAL HISTORY OF TOBACCO USE, PRESENTING HAZARDS TO HEALTH: ICD-10-CM

## 2019-07-15 LAB
CREAT SERPL-MCNC: 1 MG/DL (ref 0.5–1.4)
INR PPP: 0.94 (ref 0.87–1.13)
PLATELET # BLD AUTO: 196 K/UL (ref 164–446)
PROTHROMBIN TIME: 12.7 SEC (ref 12–14.6)

## 2019-07-15 PROCEDURE — 85049 AUTOMATED PLATELET COUNT: CPT

## 2019-07-15 PROCEDURE — 85610 PROTHROMBIN TIME: CPT

## 2019-07-15 PROCEDURE — 160002 HCHG RECOVERY MINUTES (STAT)

## 2019-07-15 PROCEDURE — 700105 HCHG RX REV CODE 258: Performed by: RADIOLOGY

## 2019-07-15 PROCEDURE — 700117 HCHG RX CONTRAST REV CODE 255: Performed by: RADIOLOGY

## 2019-07-15 PROCEDURE — 99153 MOD SED SAME PHYS/QHP EA: CPT

## 2019-07-15 PROCEDURE — 82565 ASSAY OF CREATININE: CPT

## 2019-07-15 PROCEDURE — 700111 HCHG RX REV CODE 636 W/ 250 OVERRIDE (IP): Performed by: RADIOLOGY

## 2019-07-15 PROCEDURE — 36225 PLACE CATH SUBCLAVIAN ART: CPT | Mod: LT,XS

## 2019-07-15 PROCEDURE — 75716 ARTERY X-RAYS ARMS/LEGS: CPT

## 2019-07-15 PROCEDURE — 36223 PLACE CATH CAROTID/INOM ART: CPT | Mod: RT,XS

## 2019-07-15 PROCEDURE — 700111 HCHG RX REV CODE 636 W/ 250 OVERRIDE (IP)

## 2019-07-15 RX ORDER — MIDAZOLAM HYDROCHLORIDE 1 MG/ML
.5-2 INJECTION INTRAMUSCULAR; INTRAVENOUS PRN
Status: DISCONTINUED | OUTPATIENT
Start: 2019-07-15 | End: 2019-07-15 | Stop reason: HOSPADM

## 2019-07-15 RX ORDER — ONDANSETRON 2 MG/ML
4 INJECTION INTRAMUSCULAR; INTRAVENOUS PRN
Status: DISCONTINUED | OUTPATIENT
Start: 2019-07-15 | End: 2019-07-15 | Stop reason: HOSPADM

## 2019-07-15 RX ORDER — HYDROMORPHONE HYDROCHLORIDE 2 MG/ML
0.25 INJECTION, SOLUTION INTRAMUSCULAR; INTRAVENOUS; SUBCUTANEOUS
Status: DISCONTINUED | OUTPATIENT
Start: 2019-07-15 | End: 2019-07-15 | Stop reason: HOSPADM

## 2019-07-15 RX ORDER — CEFAZOLIN SODIUM 1 G/3ML
INJECTION, POWDER, FOR SOLUTION INTRAMUSCULAR; INTRAVENOUS
Status: COMPLETED
Start: 2019-07-15 | End: 2019-07-15

## 2019-07-15 RX ORDER — OXYCODONE HYDROCHLORIDE 5 MG/1
2.5 TABLET ORAL
Status: DISCONTINUED | OUTPATIENT
Start: 2019-07-15 | End: 2019-07-15 | Stop reason: HOSPADM

## 2019-07-15 RX ORDER — SODIUM CHLORIDE 9 MG/ML
INJECTION, SOLUTION INTRAVENOUS
Status: DISCONTINUED | OUTPATIENT
Start: 2019-07-15 | End: 2019-07-15 | Stop reason: HOSPADM

## 2019-07-15 RX ORDER — MIDAZOLAM HYDROCHLORIDE 1 MG/ML
INJECTION INTRAMUSCULAR; INTRAVENOUS
Status: COMPLETED
Start: 2019-07-15 | End: 2019-07-15

## 2019-07-15 RX ORDER — SODIUM CHLORIDE 9 MG/ML
500 INJECTION, SOLUTION INTRAVENOUS
Status: DISCONTINUED | OUTPATIENT
Start: 2019-07-15 | End: 2019-07-15 | Stop reason: HOSPADM

## 2019-07-15 RX ORDER — CEFAZOLIN SODIUM 1 G/50ML
1 INJECTION, SOLUTION INTRAVENOUS ONCE
Status: COMPLETED | OUTPATIENT
Start: 2019-07-15 | End: 2019-07-15

## 2019-07-15 RX ORDER — OXYCODONE HYDROCHLORIDE 5 MG/1
5 TABLET ORAL
Status: DISCONTINUED | OUTPATIENT
Start: 2019-07-15 | End: 2019-07-15 | Stop reason: HOSPADM

## 2019-07-15 RX ADMIN — MIDAZOLAM HYDROCHLORIDE 1 MG: 1 INJECTION, SOLUTION INTRAMUSCULAR; INTRAVENOUS at 11:47

## 2019-07-15 RX ADMIN — CEFAZOLIN SODIUM 1 G: 1 INJECTION, SOLUTION INTRAVENOUS at 11:25

## 2019-07-15 RX ADMIN — MIDAZOLAM 1 MG: 1 INJECTION INTRAMUSCULAR; INTRAVENOUS at 12:15

## 2019-07-15 RX ADMIN — MIDAZOLAM 1 MG: 1 INJECTION INTRAMUSCULAR; INTRAVENOUS at 11:47

## 2019-07-15 RX ADMIN — SODIUM CHLORIDE: 9 INJECTION, SOLUTION INTRAVENOUS at 09:25

## 2019-07-15 RX ADMIN — FENTANYL CITRATE 50 MCG: 50 INJECTION, SOLUTION INTRAMUSCULAR; INTRAVENOUS at 11:52

## 2019-07-15 RX ADMIN — FENTANYL CITRATE 25 MCG: 50 INJECTION, SOLUTION INTRAMUSCULAR; INTRAVENOUS at 12:05

## 2019-07-15 RX ADMIN — FENTANYL CITRATE 25 MCG: 50 INJECTION, SOLUTION INTRAMUSCULAR; INTRAVENOUS at 11:47

## 2019-07-15 RX ADMIN — IOHEXOL 110 ML: 300 INJECTION, SOLUTION INTRAVENOUS at 12:20

## 2019-07-15 RX ADMIN — FENTANYL CITRATE 50 MCG: 0.05 INJECTION, SOLUTION INTRAMUSCULAR; INTRAVENOUS at 12:15

## 2019-07-15 RX ADMIN — MIDAZOLAM 1 MG: 1 INJECTION INTRAMUSCULAR; INTRAVENOUS at 11:52

## 2019-07-15 RX ADMIN — FENTANYL CITRATE 25 MCG: 0.05 INJECTION, SOLUTION INTRAMUSCULAR; INTRAVENOUS at 11:47

## 2019-07-15 RX ADMIN — MIDAZOLAM 1 MG: 1 INJECTION INTRAMUSCULAR; INTRAVENOUS at 12:05

## 2019-07-15 RX ADMIN — FENTANYL CITRATE 50 MCG: 50 INJECTION, SOLUTION INTRAMUSCULAR; INTRAVENOUS at 12:15

## 2019-07-15 NOTE — DISCHARGE INSTRUCTIONS
ACTIVITY: Rest and take it easy for the first 24 hours.  A responsible adult is recommended to remain with you during that time.  It is normal to feel sleepy.  We encourage you to not do anything that requires balance, judgment or coordination.    MILD FLU-LIKE SYMPTOMS ARE NORMAL. YOU MAY EXPERIENCE GENERALIZED MUSCLE ACHES, THROAT IRRITATION, HEADACHE AND/OR SOME NAUSEA.    FOR 24 HOURS DO NOT:  Drive, operate machinery or run household appliances.  Drink beer or alcoholic beverages.   Make important decisions or sign legal documents.    SPECIAL INSTRUCTIONS: follow up with primary care physician as needed   Follow up with primary care physician as needed  If you experience chest pain shortness of breath call 911 return to ER   Resume your home medications    DIET: To avoid nausea, slowly advance diet as tolerated, avoiding spicy or greasy foods for the first day.  Add more substantial food to your diet according to your physician's instructions.  Babies can be fed formula or breast milk as soon as they are hungry.  INCREASE FLUIDS AND FIBER TO AVOID CONSTIPATION.    SURGICAL DRESSING/BATHING: keep dressing clean dry intact for 24 hours, remove dressing after 24 hours.     FOLLOW-UP APPOINTMENT:  A follow-up appointment should be arranged with your doctor in [id number]; call to schedule.    You should CALL YOUR PHYSICIAN if you develop:  Fever greater than 101 degrees F.  Pain not relieved by medication, or persistent nausea or vomiting.  Excessive bleeding (blood soaking through dressing) or unexpected drainage from the wound.  Extreme redness or swelling around the incision site, drainage of pus or foul smelling drainage.  Inability to urinate or empty your bladder within 8 hours.  Problems with breathing or chest pain.    You should call 911 if you develop problems with breathing or chest pain.  If you are unable to contact your doctor or surgical center, you should go to the nearest emergency room or urgent  care center.  Physician's telephone #: [phone]    If any questions arise, call your doctor.  If your doctor is not available, please feel free to call the Surgical Center at [phone]  The Center is open Monday through Friday from 7AM to 7PM.  You can also call the HEALTH HOTLINE open 24 hours/day, 7 days/week and speak to a nurse at [phone], or toll free at [phone].    A registered nurse may call you a few days after your surgery to see how you are doing after your procedure.    MEDICATIONS: Resume taking daily medication.  Take prescribed pain medication with food.  If no medication is prescribed, you may take non-aspirin pain medication if needed.  PAIN MEDICATION CAN BE VERY CONSTIPATING.  Take a stool softener or laxative such as senokot, pericolace, or milk of magnesia if needed.    If your physician has prescribed pain medication that includes Acetaminophen (Tylenol), do not take additional Acetaminophen (Tylenol) while taking the prescribed medication.    Depression / Suicide Risk    As you are discharged from this [facility] facility, it is important to learn how to keep safe from harming yourself.    Recognize the warning signs:  · Abrupt changes in personality, positive or negative- including increase in energy   · Giving away possessions  · Change in eating patterns- significant weight changes-  positive or negative  · Change in sleeping patterns- unable to sleep or sleeping all the time   · Unwillingness or inability to communicate  · Depression  · Unusual sadness, discouragement and loneliness  · Talk of wanting to die  · Neglect of personal appearance   · Rebelliousness- reckless behavior  · Withdrawal from people/activities they love  · Confusion- inability to concentrate     If you or a loved one observes any of these behaviors or has concerns about self-harm, here's what you can do:  · Talk about it- your feelings and reasons for harming yourself  · Remove any means that you  "might use to hurt yourself (examples: pills, rope, extension cords, firearm)  · Get professional help from the community (Mental Health, Substance Abuse, psychological counseling)  · Do not be alone:Call your Safe Contact- someone whom you trust who will be there for you.  · Call your local CRISIS HOTLINE 150-3843 or 811-429-7711  · Call your local Children's Mobile Crisis Response Team [place] (321) 102-1565 or [employer]  · Call the toll free National Suicide Prevention Hotlines   · National Suicide Prevention Lifeline 155-749-PZMS (5821)  [employer] Line Network 800-SUICIDE (263-5825)  Post Angiogram Groin Care Instructions     INSTRUCTIONS  2. Examine (look and feel) the site of your incision site TODAY so you can recognize changes that should be called to your doctor (see below).  3. Avoid straining either by lifting or pulling objects for 4-5 days. Avoid lifting over 5 pounds.   4. For at least 72 hours, if you should sneeze or cough, please hold pressure over your groin area.  5. If you should begin to have oozing from the catheterization site, please hold firm pressure and call your doctor's office immediately.  6. If profuse bleeding occurs from the catheterization site, hold firm pressure and call \"[phone]\" immediately for assistance.  7. Remove bandage after 24 hours.     ACTIVITY  2. Limit activity as instructed by your doctor.  3. No driving or very limited driving with frequent stops for one week.   4. If you must take a long car ride, stop every hour and walk around the car.   5. Warm showers or baths are permitted after the bandage is removed. Avoid hot showers, baths, hot tubs, and swimming for one week.    PLEASE CALL YOUR DOCTOR IF:  1. Temperature elevation occurs.  2. Catheterization site becomes reddened or begins to drain.   3. Bruising appears to be new or not resolving. The bruise may move down your leg. This is normal.  4. The small round lump in the groin increases in " size.  5. Any leg numbness, aching, or discomfort (immediately).  6. Increasing discomfort in the leg at the insertion site.  7. Chest pains, even if relieved by Nitroglycerin.    MISCELLANEOUS INSTRUCTIONS  1. Bruising may occur as a result of heart catheterization. Some of the discoloration may travel down the leg, going from blue to green in color.  2. A small round lump under the catheterization site will remain for up to six weeks.  3. If any questions arise call your physician's office. You can also call the HEALTH HOTLINE open 24 hours/day, 7 days/week and speak to a nurse at [phone], or toll free at [phone].   4. You should call 911 if you develop problems with breathing or chest pain.    FOR PROBLEMS CALL Emily: [phone]  Cerebral Angiogram  Cerebral angiogram, also called cerebral angiography, is an imaging test. It uses X-ray or digital images and colored dye (iodine solution) that is called contrast. An angiogram is done to look at blood vessels in the brain and neck. It helps to detect any abnormalities in the vessels that might affect blood flow.  During the procedure, the physician will inject medicine into an area in your groin or arm to numb it. The physician will make a tiny incision in the groin or arm. A thin tube (catheter) will be slipped into an artery and moved to the brain or neck. The contrast will then be injected into the catheter and images will be taken of the area.  Tell a health care provider about:  · Any allergies you have, particularly shellfish.  · All medicines you are taking, including vitamins, herbs, eye drops, creams, and over-the-counter medicines.  · Any problems you or family members have had with anesthetic medicines.  · Any reactions you have had to contrast dye or iodine.  · Any blood disorders you have.  · Any surgeries you have had.  · Medical conditions you have, including pregnancy or the possibility of pregnancy.  · If you are currently  breastfeeding.  What are the risks?  Generally, this is a safe procedure. However, problems can occur and include:  · Allergic reaction to contrast material or anesthesia.  · Damage to surrounding nerves, tissues, or structures.  · Bleeding or bruising.  · Blood clot.  · Infection.  · Inability to remember what happened (amnesia) (usually temporary).  · Weakness, numbness, speech, or vision problems (usually temporary).  · Stroke.  · Kidney injury.  What happens before the procedure?  · You may have:  ¨ A physical exam.  ¨ Blood and urine tests.  ¨ Imaging tests, including X-rays or MRIs.  · Ask your health care provider about:  ¨ Changing or stopping your regular medicines. This is especially important if you are taking diabetes medicines or blood thinners.  ¨ Taking medicines such as aspirin or ibuprofen. These medicines can thin your blood. Do not take these medicines before your procedure if your health care provider asks you not to.  · Do not eat or drink anything after midnight on the night before the procedure or as directed by your health care provider.  What happens during the procedure?  · You will lie on your back on an imaging bed with a C-shaped machine around you.  · Your head will be secured to the bed with a strap or device to help you keep still.  · You will be given medicine to help you relax (sedative) through an IV in your arm.  · Your heart rate and other vital signals will be watched carefully.  · Cleaning and numbing medicine (local anesthetic) will be applied to your skin where the insertion will take place. This is usually your groin, leg, or arm.  · The radiologist will make a small incision.  · The catheter will be inserted into an artery that leads to the head. You may feel slight pressure.  · The catheter will be moved through the body all the way up to your neck and brain. Images will help your health care provider bring the catheter to the correct location.  · The dye will be injected  into the catheter and will travel to your head or neck area. You may feel a warming or burning sensation or notice a strange taste as the dye goes through your system.  · Images will be taken of how the dye flows through the area. It is normal to hear beeping noises and machines during the procedure.  · While the images are being taken, you may be given instructions on breathing, swallowing, moving, or talking.  · When the images are finished, the catheter will be slowly removed.  · Pressure will be applied to the skin to stop any bleeding. A tight bandage (dressing) or seal will be applied to the skin.  · Your IV line will be removed.  What happens after the procedure?  · You will stay in a recovery area until the medicine has worn off. Your blood pressure and pulse will be observed.  · You will be asked to lie flat and to keep your arm or leg straight in the recovery room.  · The insertion site will be watched for bleeding and you will be checked often.  · You may feel tired.  · You may feel tenderness or notice bruising at the insertion site.  This information is not intended to replace advice given to you by your health care provider. Make sure you discuss any questions you have with your health care provider.  Document Released: 05/04/2015 Document Revised: 05/25/2017 Document Reviewed: 12/31/2014  [employer] Interactive Patient Education © 2017 [employer]      I acknowledge receipt and understanding of these Home Care instructions.  ·

## 2019-07-15 NOTE — OR SURGEON
Immediate Post- Operative Note        PostOp Diagnosis: carotid stenosis    Procedure(s): carotid and cerebral angiogram      Estimated Blood Loss: Less than 5 ml        Complications: None            7/15/2019     12:49 PM     Shoaib White

## 2019-07-15 NOTE — PROGRESS NOTES
Procedure Confirmed with MD, RN, RT and patient pre procedure.  Cerebral, Carotid and Vertebral Angiogram with Possible Right Vertebral and/or Left Subclavian Artery Intervention by MD White assisted by RT Johanny, Right Femoral Artery access site.    End Tidal CO2 range 32-40 throughout procedure.    Right Femoral Artery access site, sealed with Angio-Seal, covered with gauze/tegaderm, C/D/I.   Angio-Seal VIP Vascular Closure Device, 6F x 2.00, REF# [id number] LOT# [id number], Exp. Date 2/29/2020.    1+ R DP pulse pre procedure   1+ R DP pulses post procedure     Patient tolerated procedure,  hemodynamically stable; pt awake and talking post procedure; report given to ISAAC Junior.  Patient transported to [facility] via IR RN monitored then transferred care to report RN.

## 2019-07-15 NOTE — OR NURSING
1247 patient arrived from IR s/p cerebral angiogram. Patient awake, right femoral artery access site dressing cdi and soft. Patient denies pain, denies nausea.   1255 patient provided with water tolerating well.   1405 Dr hickey at the bed side talking to patient.   1450 patient ambulated to bathroom independently tolerated well.   1500 discharge instructions given to patient, patient verbalize understanding of the orders, iv discontinued tip intact. Reviewed activity, worsening symptoms, follow up, medications, dressing care.   1510 patient escorted via w/c with all his personal belongings.

## 2019-07-18 DIAGNOSIS — I65.03 STENOSIS OF BOTH VERTEBRAL ARTERIES: ICD-10-CM

## 2019-07-18 NOTE — PROGRESS NOTES
Upon review of angiogram, there is 70% vertebral stenosis with left lateral movement of the head. Hx drop attacks and near syncope. Has other areas of carotid stenosis noted on angiogram. MD relayed results to pt via phone.     Will follow up with pt in clinic with ultrasound to be directed by Dr. White with head manipulations and discussion of possible intervention.

## 2019-07-30 NOTE — CONSULTS
Neuro Interventional Service Consultation      Re: Eb Poon     MRN: [id number]   : 1960    Eb Poon was referred to our service by Topher Bynum MD.  He is a 59 y.o. male seen in clinic for evaluation and possible vertebral artery intervention. He is also under the care of Claudia Alcocer MD.    History of Present Illness:   has a history of vascular disease with interventions that include a left carotid endarterectomy and bilateral iliac stents. Since , the patient has complained of drop attacks and near syncope with ultimately a presentation to [facility] ED. Stroke workup was negative. Imaging revealed high grade stenosis within the right ICA and left ICA siphons and right vertebral artery stenosis in the setting of left vertebral artery occlusion. He was referred to interventional radiology for an angiogram and Shoaib White MD performed a dynamic angiogram on July 15 that revealed rotational stenosis of the single right vertebral artery with head turn to the left. He has been referred for intervention of the right vertebral artery stenosis. He underwent a carotid/vertebral ultrasound today with Dr. White present to evaluate flow with head movement. Today, he reports no improvement in his symptoms and thinks they are slowly progressing. Worst dizziness, tinnitus, and weakness is when he looks up. He also has symptoms when he stands up quickly. To help the symptoms, he reclines in a chair and flexes his head forward. The symptoms are greatly impacting his quality of life. He is on Plavix. He continues to smoke.     He is seen today for review of imaging studies and discussion of possible vertebral artery intervention.     Past Medical History:   Diagnosis Date   • Arrhythmia    • Arthritis    • CAD (coronary artery disease)    • DIABETES MELLITUS    • Dizziness    • Fatigue    • Gout    • Heart disease    • High cholesterol    • Hypertension    •  Myocardial infarct (HCC) 2007   • Other and unspecified angina pectoris    • PAD (peripheral artery disease)    • Pain     back pain     Past Surgical History:   Procedure Laterality Date   • RECOVERY  6/25/2015    Procedure: VASCULAR CASE BYNUM-ABDOMINAL AORTOGRAM W/ RUNOFF, POSSIBLE INTERVENTION ICD 9: 433.10, 435.3, 443.9;  Surgeon: Radha Surgery;  Location: SURGERY PRE-POST PROC UNIT [facility] [place];  Service:    • CAROTID ENDARTERECTOMY Left 5/20/2015    Procedure: CAROTID ENDARTERECTOMY ;  Surgeon: Topher Bynum M.D.;  Location: SURGERY [facility];  Service:    • OTHER CARDIAC SURGERY      stent placement     Social History     Social History   • Marital status:      Spouse name: N/A   • Number of children: N/A   • Years of education: N/A     Occupational History   • Not on file.     Social History Main Topics   • Smoking status: Current Every Day Smoker     Packs/day: 0.50     Years: 40.00     Types: Pipe   • Smokeless tobacco: Never Used   • Alcohol use No      Comment: quit 2.5 years ago   • Drug use: No   • Sexual activity: Not on file     Other Topics Concern   • Not on file     Social History Narrative   • No narrative on file     No family history on file.    Review of Systems   HENT: Positive for tinnitus.    Eyes: Positive for blurred vision.   Cardiovascular:        Positive for near syncope   Neurological: Positive for dizziness, sensory change (tingling) and weakness.   Psychiatric/Behavioral: Positive for substance abuse (tobacco).     A comprehensive 14-point review of systems was negative except as described above.     Labs:      Ref. Range 7/15/2019 09:10   Platelet Count Latest Ref Range: 164 - 446 K/uL 196   Creatinine Latest Ref Range: 0.50 - 1.40 mg/dL 1.00   GFR If  Latest Ref Range: >60 mL/min/1.73 m 2 >60   GFR If Non  Latest Ref Range: >60 mL/min/1.73 m 2 >60   INR Latest Ref Range: 0.87 - 1.13  0.94   PT Latest Ref Range: 12.0 - 14.6 sec 12.7        Radiology:   Ultrasound with direct physician supervision immediately prior to consultation today at [facility] not yet dictated. Preliminary findings indicate diminished right vertebral artery flow with no change with head flexion and worsened restriction with lateral movements to both sides and worst restriction with head extension.    Catheter angiogram on July 15, 2019 at [facility]:  1.  There is focal 70% stenosis at the single dominant right vertebral artery at the level of C2 during left lateral position of the neck. The stenosis is only visualized on the left lateral position of the neck. This finding is concerning for positional   stenosis of the vertebral artery. However there is antegrade flow seen during left lateral position. There is no occlusion.  2.  An approximately 50-60% stenosis in the proximal right internal artery.  3.  50% stenosis in the distal cervical segment of the right internal carotid artery.  4.  Irregular contour of the petrous portion of the right internal carotid artery. The differential diagnosis includes atherosclerotic disease and fibromuscular dysplasia.  5.  An approximately 70% stenosis at the midportion of the proximal left subclavian artery.  6.  Nonvisualization of the right vertebral artery.  7.  Patent bilateral common and external iliac stents.    Current Outpatient Prescriptions   Medication Sig Dispense Refill   • nystatin (MYCOSTATIN) 141808 UNIT/GM Cream topical cream Apply to feet twice a day 1 Tube 2   • lisinopril (PRINIVIL) 2.5 MG Tab Take 2.5 mg by mouth every day.     • sertraline (ZOLOFT) 50 MG Tab Take 1 Tab by mouth every day. 30 Tab 11   • magnesium oxide (MAG-OX) 400 MG Tab Take 400 mg by mouth every day.     • B Complex Vitamins (VITAMIN-B COMPLEX PO) Take  by mouth.     • Coenzyme Q10 (COQ10) 100 MG Cap Take  by mouth.     • Omega-3 1000 MG Cap Take  by mouth.     • nitroglycerin (NITROSTAT) 0.4 MG SL Tab Place 1 Tab under tongue as needed for Chest Pain.  25 Tab 11   • clopidogrel (PLAVIX) 75 MG TABS Take 1 Tab by mouth every day. 30 Tab 3   • diazepam (VALIUM) 2 MG TABS Take 1 Tab by mouth every 6 hours as needed for Anxiety. (Patient taking differently: Take 1 mg by mouth as needed for Anxiety.) 30 Tab 0   • multivitamin (THERAGRAN) TABS Take 1 Tab by mouth every day.     • aspirin (ASA) 81 MG CHEW chewable tablet Take 81 mg by mouth every day. 180 Tab 3   • insulin glargine (LANTUS) 100 UNIT/ML SOLN Inject 15 Units as instructed every day. 10 mL 3     No current facility-administered medications for this encounter.        Allergies   Allergen Reactions   • Coreg [Carvedilol]      Made heart rate low and worsened lightedheadness.   • Ranexa [Ranolazine]      Dropped heart rate low and worsened lightheadedness.    • Statins [Hmg-Coa-R Inhibitors]      Muscle aches.       Physical Exam   Constitutional: He is oriented to person, place, and time and well-developed, well-nourished, and in no distress. No distress.   Pulmonary/Chest: Effort normal. No respiratory distress.   Abdominal: Soft.   Neurological: He is alert and oriented to person, place, and time. He has normal sensation and normal strength. He is not agitated and not disoriented. He displays no weakness, no tremor, facial symmetry, normal stance and normal speech. Gait normal. Coordination and gait normal.   Skin: Skin is warm and dry. No rash noted. He is not diaphoretic. No erythema. No pallor.   Psychiatric: Mood, memory, affect and judgment normal.     Impression:   1. 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  2. Vertebrobasilar insufficiency.  3. Left vertebral artery occlusion.   4. Distal cervical right internal carotid artery stenosis 50%.  5. Left subclavian artery stenosis 70%.  6. Proximal right internal carotid artery stenosis 50-60%   7. Coronary artery disease.   8.  Peripheral arterial disease.  9. Tobacco dependence.   10. Hypertension.  11. History of myocardial  infarction.  12. Diabetes mellitus.    Plan:   Shoaib White MD has reviewed 's history and imaging studies, examined the patient, and discussed treatment options. The drop attacks he is experiencing are related to the rotational occlusion of his single vertebral artery causing vertebrobasilar insufficiency. He is on the recommended conservative therapy for this that includes antiplatelet medications and limiting head movement. Endovascular intervention with stenting is contraindicated due to the lack of a left vertebral artery. We recommend consultation with a spine surgeon to discuss surgical options and provided the patient with some literature about his condition and potential treatment options. Mr. Poon has a family member he wishes to consult for a referral and will follow up with his PCP to discuss a surgical referral. He will contact us with the name of his surgeon once his appointment is arranged and we will personally discuss our findings with his consultant.     OTTO Reid with Shoaib White MD  Neuro Interventional Service   [address] ([address])  [place] [address]  [phone]

## 2019-07-31 ENCOUNTER — HOSPITAL ENCOUNTER (OUTPATIENT)
Dept: RADIOLOGY | Facility: MEDICAL CENTER | Age: 59
End: 2019-07-31
Attending: NURSE PRACTITIONER
Payer: MEDICARE

## 2019-07-31 DIAGNOSIS — I65.03 STENOSIS OF BOTH VERTEBRAL ARTERIES: ICD-10-CM

## 2019-07-31 PROCEDURE — 93880 EXTRACRANIAL BILAT STUDY: CPT

## 2019-07-31 ASSESSMENT — LIFESTYLE VARIABLES: SUBSTANCE_ABUSE: 1

## 2019-07-31 ASSESSMENT — ENCOUNTER SYMPTOMS
WEAKNESS: 1
BLURRED VISION: 1
SENSORY CHANGE: 1
DIZZINESS: 1

## 2020-05-22 RX ORDER — SODIUM CHLORIDE 9 MG/ML
INJECTION, SOLUTION INTRAVENOUS CONTINUOUS
Status: CANCELLED | OUTPATIENT
Start: 2020-06-24

## 2020-05-22 NOTE — PROGRESS NOTES
Pt taking prasugrel. Reviewed with Dr. White. Will not prescribe dual antiplatelet therapy; pt to remain on prasugrel for planned MONSERRAT on 6/24. Pt expressed concerns about waiting for MONSERRAT for 4 weeks and we reviewed s/s of stroke and the need to call 911 for suspected symptoms for immediate care. He verbalized understanding.

## 2020-06-02 PROBLEM — I21.4 NSTEMI (NON-ST ELEVATED MYOCARDIAL INFARCTION) (HCC): Status: ACTIVE | Noted: 2020-04-29

## 2020-06-18 ENCOUNTER — APPOINTMENT (OUTPATIENT)
Dept: ADMISSIONS | Facility: MEDICAL CENTER | Age: 60
End: 2020-06-18
Payer: MEDICARE

## 2020-06-19 DIAGNOSIS — Z01.812 PRE-OPERATIVE LABORATORY EXAMINATION: ICD-10-CM

## 2020-06-19 LAB
APTT PPP: 31.7 SEC (ref 24.7–36)
CREAT SERPL-MCNC: 1.1 MG/DL (ref 0.5–1.4)
ERYTHROCYTE [DISTWIDTH] IN BLOOD BY AUTOMATED COUNT: 45.1 FL (ref 35.9–50)
HCT VFR BLD AUTO: 41.8 % (ref 42–52)
HGB BLD-MCNC: 13.7 G/DL (ref 14–18)
INR PPP: 0.95 (ref 0.87–1.13)
MCH RBC QN AUTO: 29.1 PG (ref 27–33)
MCHC RBC AUTO-ENTMCNC: 32.8 G/DL (ref 33.7–35.3)
MCV RBC AUTO: 88.9 FL (ref 81.4–97.8)
PLATELET # BLD AUTO: 282 K/UL (ref 164–446)
PMV BLD AUTO: 9.1 FL (ref 9–12.9)
PROTHROMBIN TIME: 13 SEC (ref 12–14.6)
RBC # BLD AUTO: 4.7 M/UL (ref 4.7–6.1)
WBC # BLD AUTO: 5.5 K/UL (ref 4.8–10.8)

## 2020-06-19 PROCEDURE — 85730 THROMBOPLASTIN TIME PARTIAL: CPT

## 2020-06-19 PROCEDURE — 36415 COLL VENOUS BLD VENIPUNCTURE: CPT

## 2020-06-19 PROCEDURE — 85027 COMPLETE CBC AUTOMATED: CPT

## 2020-06-19 PROCEDURE — 82565 ASSAY OF CREATININE: CPT

## 2020-06-19 PROCEDURE — 85610 PROTHROMBIN TIME: CPT

## 2020-06-19 NOTE — OR NURSING
T/C WITH TAMARA GRAY.  PATIENT TO TAKE ALL MEDICATIONS DAY OF PROCEDURE.PATIENT STATES HE HAS BEEN TAKING PRASURGREL PER DR MOORE.

## 2020-06-23 NOTE — OR NURSING
COVID-19 Pre-surgery screenin. Do you have an undiagnosed respiratory illness or symptoms such as coughing or sneezing? no(Yes/No)  a. Onset of Sx no  b. Acute vs. chronic respiratory illness no    2. Do you have an unexplained fever greater than 100.4 degrees Fahrenheit or 38 degrees Celsius?     no (Yes/No)    3. Have you had direct exposure to a patient who tested positive for Covid-19?    no (Yes/No)    4. Have you traveled outside [facility] in the last 14 days? no    5. Have you had any loss of your sense of taste or smell? Have you had N/V or sore throat? no    Patient has been informed of visitor policy and asked to wear a mask upon entering the hospital   yes (Yes/No)

## 2020-06-24 ENCOUNTER — APPOINTMENT (OUTPATIENT)
Dept: RADIOLOGY | Facility: MEDICAL CENTER | Age: 60
End: 2020-06-24
Attending: SURGERY
Payer: MEDICARE

## 2020-06-26 ENCOUNTER — HOSPITAL ENCOUNTER (OUTPATIENT)
Dept: RADIOLOGY | Facility: MEDICAL CENTER | Age: 60
End: 2020-06-26
Payer: MEDICARE

## 2020-06-26 ENCOUNTER — APPOINTMENT (OUTPATIENT)
Dept: RADIOLOGY | Facility: MEDICAL CENTER | Age: 60
End: 2020-06-26
Attending: SURGERY
Payer: MEDICARE

## 2020-06-26 ENCOUNTER — HOSPITAL ENCOUNTER (OUTPATIENT)
Facility: MEDICAL CENTER | Age: 60
End: 2020-06-27
Attending: RADIOLOGY | Admitting: RADIOLOGY
Payer: MEDICARE

## 2020-06-26 DIAGNOSIS — I65.29 OCCLUSION AND STENOSIS OF UNSPECIFIED CAROTID ARTERY: ICD-10-CM

## 2020-06-26 DIAGNOSIS — I65.29 STENOSIS OF CAROTID ARTERY, UNSPECIFIED LATERALITY: ICD-10-CM

## 2020-06-26 DIAGNOSIS — I70.209 UNSPECIFIED ATHEROSCLEROSIS OF NATIVE ARTERIES OF EXTREMITIES, UNSPECIFIED EXTREMITY (HCC): ICD-10-CM

## 2020-06-26 DIAGNOSIS — I70.209 ATHEROSCLEROSIS OF ARTERIES OF EXTREMITIES (HCC): ICD-10-CM

## 2020-06-26 PROBLEM — I65.21 CAROTID STENOSIS, RIGHT: Status: ACTIVE | Noted: 2018-02-07

## 2020-06-26 LAB
ACT BLD: 279 SEC (ref 74–137)
ALBUMIN SERPL BCP-MCNC: 3.6 G/DL (ref 3.2–4.9)
ALBUMIN/GLOB SERPL: 1.2 G/DL
ALP SERPL-CCNC: 56 U/L (ref 30–99)
ALT SERPL-CCNC: 10 U/L (ref 2–50)
ANION GAP SERPL CALC-SCNC: 13 MMOL/L (ref 7–16)
AST SERPL-CCNC: 25 U/L (ref 12–45)
BASOPHILS # BLD AUTO: 0.5 % (ref 0–1.8)
BASOPHILS # BLD: 0.02 K/UL (ref 0–0.12)
BILIRUB SERPL-MCNC: 0.4 MG/DL (ref 0.1–1.5)
BUN SERPL-MCNC: 12 MG/DL (ref 8–22)
CALCIUM SERPL-MCNC: 8.8 MG/DL (ref 8.5–10.5)
CHLORIDE SERPL-SCNC: 108 MMOL/L (ref 96–112)
CO2 SERPL-SCNC: 19 MMOL/L (ref 20–33)
CREAT SERPL-MCNC: 0.78 MG/DL (ref 0.5–1.4)
EOSINOPHIL # BLD AUTO: 0.1 K/UL (ref 0–0.51)
EOSINOPHIL NFR BLD: 2.5 % (ref 0–6.9)
ERYTHROCYTE [DISTWIDTH] IN BLOOD BY AUTOMATED COUNT: 46.5 FL (ref 35.9–50)
GLOBULIN SER CALC-MCNC: 2.9 G/DL (ref 1.9–3.5)
GLUCOSE BLD-MCNC: 105 MG/DL (ref 65–99)
GLUCOSE BLD-MCNC: 129 MG/DL (ref 65–99)
GLUCOSE BLD-MCNC: 130 MG/DL (ref 65–99)
GLUCOSE SERPL-MCNC: 87 MG/DL (ref 65–99)
HCT VFR BLD AUTO: 35 % (ref 42–52)
HGB BLD-MCNC: 11.5 G/DL (ref 14–18)
IMM GRANULOCYTES # BLD AUTO: 0.01 K/UL (ref 0–0.11)
IMM GRANULOCYTES NFR BLD AUTO: 0.3 % (ref 0–0.9)
LYMPHOCYTES # BLD AUTO: 1.54 K/UL (ref 1–4.8)
LYMPHOCYTES NFR BLD: 38.9 % (ref 22–41)
MAGNESIUM SERPL-MCNC: 1.7 MG/DL (ref 1.5–2.5)
MCH RBC QN AUTO: 29.2 PG (ref 27–33)
MCHC RBC AUTO-ENTMCNC: 32.9 G/DL (ref 33.7–35.3)
MCV RBC AUTO: 88.8 FL (ref 81.4–97.8)
MONOCYTES # BLD AUTO: 0.29 K/UL (ref 0–0.85)
MONOCYTES NFR BLD AUTO: 7.3 % (ref 0–13.4)
NEUTROPHILS # BLD AUTO: 2 K/UL (ref 1.82–7.42)
NEUTROPHILS NFR BLD: 50.5 % (ref 44–72)
NRBC # BLD AUTO: 0 K/UL
NRBC BLD-RTO: 0 /100 WBC
PLATELET # BLD AUTO: 187 K/UL (ref 164–446)
PMV BLD AUTO: 9.4 FL (ref 9–12.9)
POTASSIUM SERPL-SCNC: 5.3 MMOL/L (ref 3.6–5.5)
PROT SERPL-MCNC: 6.5 G/DL (ref 6–8.2)
RBC # BLD AUTO: 3.94 M/UL (ref 4.7–6.1)
SODIUM SERPL-SCNC: 140 MMOL/L (ref 135–145)
WBC # BLD AUTO: 4 K/UL (ref 4.8–10.8)

## 2020-06-26 PROCEDURE — 99153 MOD SED SAME PHYS/QHP EA: CPT

## 2020-06-26 PROCEDURE — A9270 NON-COVERED ITEM OR SERVICE: HCPCS | Performed by: HOSPITALIST

## 2020-06-26 PROCEDURE — 700105 HCHG RX REV CODE 258: Performed by: NURSE PRACTITIONER

## 2020-06-26 PROCEDURE — G0378 HOSPITAL OBSERVATION PER HR: HCPCS

## 2020-06-26 PROCEDURE — 99219 PR INITIAL OBSERVATION CARE,LEVL II: CPT | Performed by: HOSPITALIST

## 2020-06-26 PROCEDURE — 160002 HCHG RECOVERY MINUTES (STAT)

## 2020-06-26 PROCEDURE — 700111 HCHG RX REV CODE 636 W/ 250 OVERRIDE (IP): Performed by: RADIOLOGY

## 2020-06-26 PROCEDURE — 700105 HCHG RX REV CODE 258: Performed by: RADIOLOGY

## 2020-06-26 PROCEDURE — 82962 GLUCOSE BLOOD TEST: CPT | Mod: 91

## 2020-06-26 PROCEDURE — 80053 COMPREHEN METABOLIC PANEL: CPT

## 2020-06-26 PROCEDURE — 85347 COAGULATION TIME ACTIVATED: CPT

## 2020-06-26 PROCEDURE — 96374 THER/PROPH/DIAG INJ IV PUSH: CPT

## 2020-06-26 PROCEDURE — 83735 ASSAY OF MAGNESIUM: CPT

## 2020-06-26 PROCEDURE — 99203 OFFICE O/P NEW LOW 30 MIN: CPT | Performed by: INTERNAL MEDICINE

## 2020-06-26 PROCEDURE — 700111 HCHG RX REV CODE 636 W/ 250 OVERRIDE (IP)

## 2020-06-26 PROCEDURE — 96372 THER/PROPH/DIAG INJ SC/IM: CPT

## 2020-06-26 PROCEDURE — 700102 HCHG RX REV CODE 250 W/ 637 OVERRIDE(OP): Performed by: HOSPITALIST

## 2020-06-26 PROCEDURE — 85025 COMPLETE CBC W/AUTO DIFF WBC: CPT

## 2020-06-26 PROCEDURE — 700117 HCHG RX CONTRAST REV CODE 255: Performed by: RADIOLOGY

## 2020-06-26 RX ORDER — ONDANSETRON 2 MG/ML
INJECTION INTRAMUSCULAR; INTRAVENOUS
Status: COMPLETED
Start: 2020-06-26 | End: 2020-06-26

## 2020-06-26 RX ORDER — BISACODYL 10 MG
10 SUPPOSITORY, RECTAL RECTAL
Status: DISCONTINUED | OUTPATIENT
Start: 2020-06-26 | End: 2020-06-27 | Stop reason: HOSPADM

## 2020-06-26 RX ORDER — HEPARIN SODIUM,PORCINE 1000/ML
VIAL (ML) INJECTION
Status: COMPLETED
Start: 2020-06-26 | End: 2020-06-26

## 2020-06-26 RX ORDER — PROMETHAZINE HYDROCHLORIDE 25 MG/1
12.5-25 TABLET ORAL EVERY 4 HOURS PRN
Status: DISCONTINUED | OUTPATIENT
Start: 2020-06-26 | End: 2020-06-27 | Stop reason: HOSPADM

## 2020-06-26 RX ORDER — PRASUGREL 10 MG/1
10 TABLET, FILM COATED ORAL DAILY
Status: DISCONTINUED | OUTPATIENT
Start: 2020-06-27 | End: 2020-06-27 | Stop reason: HOSPADM

## 2020-06-26 RX ORDER — SODIUM CHLORIDE 9 MG/ML
INJECTION, SOLUTION INTRAVENOUS CONTINUOUS
Status: DISCONTINUED | OUTPATIENT
Start: 2020-06-26 | End: 2020-06-27 | Stop reason: HOSPADM

## 2020-06-26 RX ORDER — PROMETHAZINE HYDROCHLORIDE 25 MG/1
12.5-25 SUPPOSITORY RECTAL EVERY 4 HOURS PRN
Status: DISCONTINUED | OUTPATIENT
Start: 2020-06-26 | End: 2020-06-27 | Stop reason: HOSPADM

## 2020-06-26 RX ORDER — CARVEDILOL 3.12 MG/1
3.12 TABLET ORAL 2 TIMES DAILY WITH MEALS
COMMUNITY
End: 2022-07-13

## 2020-06-26 RX ORDER — MIDAZOLAM HYDROCHLORIDE 1 MG/ML
.5-2 INJECTION INTRAMUSCULAR; INTRAVENOUS PRN
Status: DISCONTINUED | OUTPATIENT
Start: 2020-06-26 | End: 2020-06-26 | Stop reason: HOSPADM

## 2020-06-26 RX ORDER — AMOXICILLIN 250 MG
2 CAPSULE ORAL 2 TIMES DAILY
Status: DISCONTINUED | OUTPATIENT
Start: 2020-06-26 | End: 2020-06-27 | Stop reason: HOSPADM

## 2020-06-26 RX ORDER — ENALAPRILAT 1.25 MG/ML
1.25 INJECTION INTRAVENOUS EVERY 6 HOURS PRN
Status: DISCONTINUED | OUTPATIENT
Start: 2020-06-26 | End: 2020-06-27 | Stop reason: HOSPADM

## 2020-06-26 RX ORDER — ACETAMINOPHEN 325 MG/1
650 TABLET ORAL EVERY 6 HOURS PRN
Status: DISCONTINUED | OUTPATIENT
Start: 2020-06-26 | End: 2020-06-27 | Stop reason: HOSPADM

## 2020-06-26 RX ORDER — ONDANSETRON 4 MG/1
4 TABLET, ORALLY DISINTEGRATING ORAL EVERY 4 HOURS PRN
Status: DISCONTINUED | OUTPATIENT
Start: 2020-06-26 | End: 2020-06-27 | Stop reason: HOSPADM

## 2020-06-26 RX ORDER — HEPARIN SODIUM 1000 [USP'U]/ML
5000 INJECTION, SOLUTION INTRAVENOUS; SUBCUTANEOUS ONCE
Status: COMPLETED | OUTPATIENT
Start: 2020-06-26 | End: 2020-06-26

## 2020-06-26 RX ORDER — LABETALOL HYDROCHLORIDE 5 MG/ML
10 INJECTION, SOLUTION INTRAVENOUS EVERY 4 HOURS PRN
Status: DISCONTINUED | OUTPATIENT
Start: 2020-06-26 | End: 2020-06-27 | Stop reason: HOSPADM

## 2020-06-26 RX ORDER — CARVEDILOL 3.12 MG/1
3.12 TABLET ORAL 2 TIMES DAILY WITH MEALS
Status: DISCONTINUED | OUTPATIENT
Start: 2020-06-26 | End: 2020-06-27 | Stop reason: HOSPADM

## 2020-06-26 RX ORDER — INSULIN GLARGINE 100 [IU]/ML
15 INJECTION, SOLUTION SUBCUTANEOUS
Status: DISCONTINUED | OUTPATIENT
Start: 2020-06-26 | End: 2020-06-27 | Stop reason: HOSPADM

## 2020-06-26 RX ORDER — POLYETHYLENE GLYCOL 3350 17 G/17G
1 POWDER, FOR SOLUTION ORAL
Status: DISCONTINUED | OUTPATIENT
Start: 2020-06-26 | End: 2020-06-27 | Stop reason: HOSPADM

## 2020-06-26 RX ORDER — MIDAZOLAM HYDROCHLORIDE 1 MG/ML
INJECTION INTRAMUSCULAR; INTRAVENOUS
Status: COMPLETED
Start: 2020-06-26 | End: 2020-06-26

## 2020-06-26 RX ORDER — ONDANSETRON 2 MG/ML
4 INJECTION INTRAMUSCULAR; INTRAVENOUS EVERY 4 HOURS PRN
Status: DISCONTINUED | OUTPATIENT
Start: 2020-06-26 | End: 2020-06-27 | Stop reason: HOSPADM

## 2020-06-26 RX ORDER — LISINOPRIL 2.5 MG/1
2.5 TABLET ORAL DAILY
Status: DISCONTINUED | OUTPATIENT
Start: 2020-06-26 | End: 2020-06-27 | Stop reason: HOSPADM

## 2020-06-26 RX ORDER — CLOPIDOGREL BISULFATE 75 MG/1
75 TABLET ORAL DAILY
Status: DISCONTINUED | OUTPATIENT
Start: 2020-06-27 | End: 2020-06-26

## 2020-06-26 RX ORDER — SODIUM CHLORIDE 9 MG/ML
500 INJECTION, SOLUTION INTRAVENOUS
Status: DISCONTINUED | OUTPATIENT
Start: 2020-06-26 | End: 2020-06-26 | Stop reason: HOSPADM

## 2020-06-26 RX ORDER — ONDANSETRON 2 MG/ML
4 INJECTION INTRAMUSCULAR; INTRAVENOUS PRN
Status: DISCONTINUED | OUTPATIENT
Start: 2020-06-26 | End: 2020-06-26 | Stop reason: HOSPADM

## 2020-06-26 RX ORDER — ERGOCALCIFEROL 1.25 MG/1
50000 CAPSULE ORAL
COMMUNITY
End: 2022-07-13

## 2020-06-26 RX ORDER — PROCHLORPERAZINE EDISYLATE 5 MG/ML
5-10 INJECTION INTRAMUSCULAR; INTRAVENOUS EVERY 4 HOURS PRN
Status: DISCONTINUED | OUTPATIENT
Start: 2020-06-26 | End: 2020-06-27 | Stop reason: HOSPADM

## 2020-06-26 RX ORDER — DEXTROSE MONOHYDRATE 25 G/50ML
50 INJECTION, SOLUTION INTRAVENOUS
Status: DISCONTINUED | OUTPATIENT
Start: 2020-06-26 | End: 2020-06-27 | Stop reason: HOSPADM

## 2020-06-26 RX ADMIN — IOHEXOL 80 ML: 300 INJECTION, SOLUTION INTRAVENOUS at 11:45

## 2020-06-26 RX ADMIN — FENTANYL CITRATE 25 MCG: 50 INJECTION INTRAMUSCULAR; INTRAVENOUS at 11:12

## 2020-06-26 RX ADMIN — MIDAZOLAM HYDROCHLORIDE 1 MG: 1 INJECTION INTRAMUSCULAR; INTRAVENOUS at 11:12

## 2020-06-26 RX ADMIN — ATROPINE SULFATE 0.5 MG: 0.1 INJECTION, SOLUTION ENDOTRACHEAL; INTRAMUSCULAR; INTRAVENOUS; SUBCUTANEOUS at 11:47

## 2020-06-26 RX ADMIN — MIDAZOLAM HYDROCHLORIDE 1 MG: 1 INJECTION, SOLUTION INTRAMUSCULAR; INTRAVENOUS at 11:12

## 2020-06-26 RX ADMIN — INSULIN GLARGINE 15 UNITS: 100 INJECTION, SOLUTION SUBCUTANEOUS at 17:19

## 2020-06-26 RX ADMIN — ONDANSETRON 4 MG: 2 INJECTION INTRAMUSCULAR; INTRAVENOUS at 12:33

## 2020-06-26 RX ADMIN — SODIUM CHLORIDE: 9 INJECTION, SOLUTION INTRAVENOUS at 14:41

## 2020-06-26 RX ADMIN — HEPARIN SODIUM 5000 UNITS: 1000 INJECTION, SOLUTION INTRAVENOUS; SUBCUTANEOUS at 10:34

## 2020-06-26 RX ADMIN — SODIUM CHLORIDE 500 ML: 9 INJECTION, SOLUTION INTRAVENOUS at 11:45

## 2020-06-26 RX ADMIN — ATROPINE SULFATE 0.5 MG: 0.1 INJECTION, SOLUTION ENDOTRACHEAL; INTRAMUSCULAR; INTRAVENOUS; SUBCUTANEOUS at 11:40

## 2020-06-26 RX ADMIN — PROMETHAZINE HYDROCHLORIDE 25 MG: 25 TABLET ORAL at 13:08

## 2020-06-26 ASSESSMENT — ENCOUNTER SYMPTOMS
TINGLING: 0
DIZZINESS: 0
VOMITING: 0
DOUBLE VISION: 0
CLAUDICATION: 0
CONSTIPATION: 0
POLYDIPSIA: 0
DIAPHORESIS: 0
SHORTNESS OF BREATH: 0
PND: 0
BACK PAIN: 0
CHILLS: 0
STRIDOR: 0
EYE PAIN: 0
HEARTBURN: 0
HEMOPTYSIS: 0
NECK PAIN: 0
MYALGIAS: 0
DIARRHEA: 0
FEVER: 0
SPUTUM PRODUCTION: 0
FLANK PAIN: 0
BLOOD IN STOOL: 0
SINUS PAIN: 0
WEAKNESS: 0
SORE THROAT: 0
ABDOMINAL PAIN: 0
TREMORS: 0
PHOTOPHOBIA: 0
BLURRED VISION: 1
COUGH: 0
HEADACHES: 0
HALLUCINATIONS: 0
BRUISES/BLEEDS EASILY: 0
FALLS: 0
ORTHOPNEA: 0
PALPITATIONS: 0
NAUSEA: 0
DEPRESSION: 0
WHEEZING: 0

## 2020-06-26 ASSESSMENT — LIFESTYLE VARIABLES: SUBSTANCE_ABUSE: 0

## 2020-06-26 NOTE — PROGRESS NOTES
IR Nursing Note:    Procedure Confirmed with MD, patient and RN pre procedure. Consent obtained by MD with all questions answered, placed in Patient's chart. Patient assisted to the table in the supine position with all bony prominences padded and draped in sterile fashion.    Carotid/Cerebral Angiogram with Possible Right Carotid Stent Placement by MD White assisted by RT Mallory, right groin (femoral) access site sealed with a Terumo AngioSeal vascular closure device and CDI with gauze and a tegaderm dressing.      TERUMO Angio-Seal Vascular Closure Device 6F  REF# [id number] LOT# [id number] EXP. EXP 01/31/2021    End Tidal CO2 range 12-30 during procedure.     Patient tolerated procedure, hemodynamically stable; pt awake and alert and moving all extremities post procedure; report given to ISAAC Grant; patient transported to [facility] via IR RN monitored then transferred care to report RN.    Right Internal Carotid Stent  Abbott XACT Carotid Stent System 8-6mm x 40mm  REF# [id number] LOT# [id number] EXP. 04/30/2023

## 2020-06-26 NOTE — H&P
Hospital Medicine History & Physical Note    Date of Service  6/26/2020    Primary Care Physician  Claudia Alcocer M.D.    Code Status  Full Code    Chief Complaint  Status post carotid stent    History of Presenting Illness  60 y.o. male who presented 6/26/2020 with past medical history of diabetes, systolic heart failure, coronary disease status post stent, hyperlipidemia, peripheral artery disease comes in to the hospital for carotid artery stent placement by IR.  Patient was evaluated by me postop.  Patient states that for months he has been having intermittent episodes of blurry vision in which he describes as lightning bolts.  He also has intermittent episodes of lightheadedness.  This would occur mostly when he looks right or left.  Postop patient had mild headache in the frontal area.  He denies any weakness, new numbness, dizziness, slurred speech, difficulty swallowing.  Patient does have chronic numbness and tingling from his diabetes.  Patient denies ever having strokes.  He was known to have carotid artery stenosis since 2015.  He also has occlusion of the vertebral arteries in which he had intervention on in 2019.  Patient states that he had intolerance to statins in the past.  He was prescribed low-dose Crestor and was being evaluated for PCSK 9 therapy.     Review of Systems  Review of Systems   Constitutional: Negative for chills, diaphoresis, fever and malaise/fatigue.   HENT: Negative for congestion, ear discharge, ear pain, hearing loss, nosebleeds, sinus pain, sore throat and tinnitus.    Eyes: Positive for blurred vision. Negative for double vision, photophobia and pain.   Respiratory: Negative for cough, hemoptysis, sputum production, shortness of breath, wheezing and stridor.    Cardiovascular: Negative for chest pain, palpitations, orthopnea, claudication, leg swelling and PND.   Gastrointestinal: Negative for abdominal pain, blood in stool, constipation, diarrhea, heartburn, melena,  nausea and vomiting.   Genitourinary: Negative for dysuria, flank pain, frequency, hematuria and urgency.   Musculoskeletal: Negative for back pain, falls, joint pain, myalgias and neck pain.   Skin: Negative for itching and rash.   Neurological: Negative for dizziness, tingling, tremors, weakness and headaches.   Endo/Heme/Allergies: Negative for environmental allergies and polydipsia. Does not bruise/bleed easily.   Psychiatric/Behavioral: Negative for depression, hallucinations, substance abuse and suicidal ideas.       Past Medical History   has a past medical history of Arrhythmia, Arthritis, Bowel habit changes (06/10/2020), CAD (coronary artery disease), Congestive heart failure (HCC), DIABETES MELLITUS (06/19/2020), Dizziness, Fatigue, Gout, Heart disease, High cholesterol, Hypertension, Myocardial infarct (HCC) (2007), Other and unspecified angina pectoris, PAD (peripheral artery disease), and Pain.    Surgical History   has a past surgical history that includes carotid endarterectomy (Left, 5/20/2015); recovery (6/25/2015); and other cardiac surgery.     Family History  Family history reviewed and not pertinent    Social History   reports that he has been smoking pipe. He has smoked for the past 40.00 years. He has never used smokeless tobacco. He reports that he does not drink alcohol or use drugs.    Allergies  Allergies   Allergen Reactions   • Ranexa [Ranolazine]      Dropped heart rate low and worsened lightheadedness.    • Statins [Hmg-Coa-R Inhibitors]      Muscle aches.       Medications  Prior to Admission Medications   Prescriptions Last Dose Informant Patient Reported? Taking?   B Complex Vitamins (VITAMIN-B COMPLEX PO) >1 week ago Patient Yes No   Sig: Take  by mouth.   NON SPECIFIED  Patient Yes No   NON SPECIFIED 6/26/2020 at 1000 Patient Yes No   Sig: Take 10 mg by mouth. TAKING PRASURGREL   aspirin (ASA) 81 MG CHEW chewable tablet 6/26/2020 at 1000 Patient Yes No   Sig: Take 81 mg by mouth  every day.   carvedilol (COREG) 3.125 MG Tab 6/25/2020 at 2000  Yes Yes   Sig: Take 3.125 mg by mouth 2 times a day, with meals.   diazepam (VALIUM) 2 MG TABS 6/26/2020 at 0630 Patient No No   Sig: Take 1 Tab by mouth every 6 hours as needed for Anxiety.   Patient taking differently: Take 1 mg by mouth as needed for Anxiety.   insulin glargine (LANTUS) 100 UNIT/ML SOLN 6/25/2020 at 1700 Patient No No   Sig: Inject 15 Units as instructed every day.   lisinopril (PRINIVIL) 2.5 MG Tab 6/25/2020 at 0800 Patient Yes No   Sig: Take 2.5 mg by mouth every day.   magnesium oxide (MAG-OX) 400 MG Tab >1 week ago Patient Yes No   Sig: Take 400 mg by mouth every day.   nitroglycerin (NITROSTAT) 0.4 MG SL Tab >2 weeks ago Patient No No   Sig: Place 1 Tab under tongue as needed for Chest Pain.   vitamin D, Ergocalciferol, (DRISDOL) 1.25 MG (35598 UT) Cap capsule 6/25/2020 at 0800  Yes Yes   Sig: Take 50,000 Units by mouth every 7 days.      Facility-Administered Medications: None       Physical Exam  Temp:  [36.4 °C (97.5 °F)-36.9 °C (98.4 °F)] 36.9 °C (98.4 °F)  Pulse:  [] 50  Resp:  [11-67] 17  BP: ()/(35-85) 101/51  SpO2:  [96 %-100 %] 96 %    Physical Exam  Vitals signs and nursing note reviewed.   Constitutional:       General: He is not in acute distress.     Appearance: Normal appearance. He is not ill-appearing, toxic-appearing or diaphoretic.   HENT:      Head: Normocephalic and atraumatic.      Nose: No congestion or rhinorrhea.      Mouth/Throat:      Pharynx: No oropharyngeal exudate or posterior oropharyngeal erythema.   Eyes:      General: No scleral icterus.  Neck:      Musculoskeletal: No neck rigidity or muscular tenderness.      Vascular: No carotid bruit.   Cardiovascular:      Rate and Rhythm: Normal rate and regular rhythm.      Pulses: Normal pulses.      Heart sounds: Normal heart sounds. No murmur. No friction rub. No gallop.    Pulmonary:      Effort: Pulmonary effort is normal. No respiratory  distress.      Breath sounds: Normal breath sounds. No stridor. No wheezing or rhonchi.   Abdominal:      General: Abdomen is flat. There is no distension.      Palpations: There is no mass.      Tenderness: There is no abdominal tenderness. There is no left CVA tenderness, guarding or rebound.      Hernia: No hernia is present.   Musculoskeletal: Normal range of motion.         General: No swelling.      Right lower leg: No edema.      Left lower leg: No edema.   Lymphadenopathy:      Cervical: No cervical adenopathy.   Skin:     General: Skin is warm and dry.      Capillary Refill: Capillary refill takes more than 3 seconds.      Coloration: Skin is not jaundiced or pale.      Findings: No bruising or erythema.   Neurological:      Mental Status: He is alert.         Laboratory:  Recent Labs     06/26/20  1449   WBC 4.0*   RBC 3.94*   HEMOGLOBIN 11.5*   HEMATOCRIT 35.0*   MCV 88.8   MCH 29.2   MCHC 32.9*   RDW 46.5   PLATELETCT 187   MPV 9.4     Recent Labs     06/26/20  1449   SODIUM 140   POTASSIUM 5.3   CHLORIDE 108   CO2 19*   GLUCOSE 87   BUN 12   CREATININE 0.78   CALCIUM 8.8     Recent Labs     06/26/20  1449   ALTSGPT 10   ASTSGOT 25   ALKPHOSPHAT 56   TBILIRUBIN 0.4   GLUCOSE 87         No results for input(s): NTPROBNP in the last 72 hours.      No results for input(s): TROPONINT in the last 72 hours.    Imaging:  OUTSIDE IMAGES-CT HEAD   Final Result      IR-CAROTID-CEREBRAL BILATERAL    (Results Pending)         Assessment/Plan:    * Carotid stenosis, right  Assessment & Plan  60% stenosis of the right ICA found on Doppler study  Has a history of TIA and blurry vision, denies blacking out  Status post stent placement  Every1 hr neurochecks for 16 hours  Maintain blood pressure less than 140    Ischemic cardiomyopathy- (present on admission)  Assessment & Plan  Compensated  Continue OMT medications including Coreg and lisinopril    Dyslipidemia- (present on admission)  Assessment & Plan  Patient states  that he has intolerance to statins  Check lipid panel  I recommended patient be evaluated for Zetia as an outpatient    Coronary artery disease of native artery of native heart with stable angina pectoris (HCC)- (present on admission)  Assessment & Plan  On aspirin and Effient  Status post stent in 2007    Diabetes mellitus with neurological manifestations (CMS-HCC)- (present on admission)  Assessment & Plan  Start on insulin sliding scale with serial Accu-Checks  Check hemoglobin A1c  Hypoglycemic protocol in place  Lantus 15 units

## 2020-06-26 NOTE — OR NURSING
Pt recovering well at this time. Denies pain. Has some nausea, receiving medication to help, is able to tolerate sips of water. Right femoral site is CDI with dressing in place. Pt has glasses on, all other belongings in bag on bed in PACU. Wife was called and updated on pt status and room assignment. Pt A&Ox4, VSS.

## 2020-06-26 NOTE — OR SURGEON
Immediate Post- Operative Note        PostOp Diagnosis: Right carotid stenosis    Procedure(s):Right carotid stent palcement with embolic protection device      Estimated Blood Loss: Less than 5 ml        Complications: None            6/26/2020     12:07 PM     Shoaib White M.D.

## 2020-06-26 NOTE — H&P
History and Physical    Date: 6/26/2020    PCP: Claudia Alcocer M.D.        HPI: This is a 60 y.o. male who is presenting with right carotid stenosis    Past Medical History:   Diagnosis Date   • Arrhythmia    • Arthritis    • Bowel habit changes 06/10/2020    Constipation   • CAD (coronary artery disease)    • Congestive heart failure (HCC)    • DIABETES MELLITUS 06/19/2020    Insulin   • Dizziness    • Fatigue    • Gout    • Heart disease    • High cholesterol    • Hypertension    • Myocardial infarct (HCC) 2007    MI in 2010 and 2020   • Other and unspecified angina pectoris    • PAD (peripheral artery disease)    • Pain     back pain       Past Surgical History:   Procedure Laterality Date   • RECOVERY  6/25/2015    Procedure: VASCULAR CASE BYNUM-ABDOMINAL AORTOGRAM W/ RUNOFF, POSSIBLE INTERVENTION ICD 9: 433.10, 435.3, 443.9;  Surgeon: [facility];  [place] [facility] [place]:    • CAROTID ENDARTERECTOMY Left 5/20/2015    Procedure: CAROTID ENDARTERECTOMY ;  Surgeon: Topher Bynum M.D.;  Location: SURGERY [facility];  Service:    • OTHER CARDIAC SURGERY      left, stent placement       Current Facility-Administered Medications   Medication Dose Route Frequency Provider Last Rate Last Dose   • ATROPINE SULFATE 0.1 MG/ML INJ SOLN            • NS infusion   Intravenous Continuous Ysabel ENRRIQUE Bowen A.P.N.            Social History     Socioeconomic History   • Marital status:      Spouse name: Not on file   • Number of children: Not on file   • Years of education: Not on file   • Highest education level: Not on file   Occupational History   • Not on file   Social Needs   • Financial resource strain: Not on file   • Food insecurity     Worry: Not on file     Inability: Not on file   • Transportation needs     Medical: Not on file     Non-medical: Not on file   Tobacco Use   • Smoking status: Current Every Day Smoker     Years: 40.00     Types: Pipe   • Smokeless  tobacco: Never Used   Substance and Sexual Activity   • Alcohol use: No     Comment: quit 2.5 years ago   • Drug use: No   • Sexual activity: Not on file   Lifestyle   • Physical activity     Days per week: Not on file     Minutes per session: Not on file   • Stress: Not on file   Relationships   • Social connections     Talks on phone: Not on file     Gets together: Not on file     Attends [employer] service: Not on file     Active member of club or organization: Not on file     Attends meetings of clubs or organizations: Not on file     Relationship status: Not on file   • Intimate partner violence     Fear of current or ex partner: Not on file     Emotionally abused: Not on file     Physically abused: Not on file     Forced sexual activity: Not on file   Other Topics Concern   • Not on file   Social History Narrative   • Not on file       History reviewed. No pertinent family history.    Allergies:  Ranexa [ranolazine] and Statins [hmg-coa-r inhibitors]    Review of Systems:  Drop attcks  Physical Exam   Constitutional: He is oriented to person, place, and time. He appears well-developed and well-nourished.   HENT:   Head: Normocephalic.   Eyes: No scleral icterus.   Pulmonary/Chest: Effort normal. No stridor. No respiratory distress.   Neurological: He is alert and oriented to person, place, and time. No cranial nerve deficit. Coordination normal.   Skin: Skin is dry. No rash noted. No erythema. No pallor.   Psychiatric: He has a normal mood and affect. His behavior is normal. Judgment and thought content normal.       Vital Signs  Blood Pressure: 136/79   Temperature: 36.8 °C (98.3 °F)   Pulse: 82   Respiration: 18   Pulse Oximetry: 100 %        Labs:                    Radiology:  OUTSIDE IMAGES-CT HEAD   Final Result      IR-CAROTID-CEREBRAL BILATERAL    (Results Pending)             Assessment and Plan:This is a 60 y.o.with right carotid stenosis    Plan:Diagnostic angiogram and possible right carotid  stenting palcement

## 2020-06-26 NOTE — CONSULTS
Critical Care Consultation    Date of consult: 6/26/2020    Referring Physician  Shoaib White M.D.    Reason for Consultation  I was asked to provide critical care consultation for right carotid stenosis s/p carotid stent placement.    History of Presenting Illness  60 y.o. male who presented 6/26/2020 with history of peripheral artery disease, CAD status post stent, diabetes mellitus, and hyperlipidemia.  He has been having intermittent episodes of blurry vision for several months which were associated with lightheadedness.  He was evaluated and found to have right carotid artery stenosis.  He was taken to the interventional radiology suite by Dr. Seo this afternoon.  A right carotid stent was placed and he has been delivered to the intensive care unit for further evaluation and postoperative management.    Code Status  Full Code    Review of System  Constitutional: Negative.    HENT: Negative.    Eyes: Negative for blurred vision and double vision.   Respiratory: Negative.    Cardiovascular: Negative.    Gastrointestinal: Negative.    Genitourinary: Negative.    Musculoskeletal: Negative.    Neurological:        Lightheadedness   All other systems reviewed and are negative.    Past Medical History   has a past medical history of Arrhythmia, Arthritis, Bowel habit changes (06/10/2020), CAD (coronary artery disease), Congestive heart failure (HCC), DIABETES MELLITUS (06/19/2020), Dizziness, Fatigue, Gout, Heart disease, High cholesterol, Hypertension, Myocardial infarct (HCC) (2007), Other and unspecified angina pectoris, PAD (peripheral artery disease), and Pain.    Surgical History   has a past surgical history that includes carotid endarterectomy (Left, 5/20/2015); recovery (6/25/2015); and other cardiac surgery.    Family History  family history is not on file.    Social History   reports that he has been smoking pipe. He has smoked for the past 40.00 years. He has never used smokeless tobacco. He reports  that he does not drink alcohol or use drugs.    Medications  Home Medications     Reviewed by Ana Shah R.N. (Registered Nurse) on 06/26/20 at 1047  Med List Status: Complete   Medication Last Dose Status   aspirin (ASA) 81 MG CHEW chewable tablet 6/26/2020 Active   B Complex Vitamins (VITAMIN-B COMPLEX PO) >1 week ago Active   carvedilol (COREG) 3.125 MG Tab 6/25/2020 Active   diazepam (VALIUM) 2 MG TABS 6/26/2020 Active   insulin glargine (LANTUS) 100 UNIT/ML SOLN 6/25/2020 Active   lisinopril (PRINIVIL) 2.5 MG Tab 6/25/2020 Active   magnesium oxide (MAG-OX) 400 MG Tab >1 week ago Active   nitroglycerin (NITROSTAT) 0.4 MG SL Tab >2 weeks ago Active   NON SPECIFIED  Active   NON SPECIFIED 6/26/2020 Active   vitamin D, Ergocalciferol, (DRISDOL) 1.25 MG (80449 UT) Cap capsule 6/25/2020 Active              Current Facility-Administered Medications   Medication Dose Route Frequency Provider Last Rate Last Dose   • NS infusion   Intravenous Continuous Ysabel ENRRIQUE Bowen, A.P.N. 125 mL/hr at 06/26/20 1441     • [START ON 6/27/2020] aspirin EC (ECOTRIN) tablet 81 mg  81 mg Oral DAILY Shoaib White M.D.       • senna-docusate (PERICOLACE or SENOKOT S) 8.6-50 MG per tablet 2 Tab  2 Tab Oral BID Barak Raymond M.D.        And   • polyethylene glycol/lytes (MIRALAX) PACKET 1 Packet  1 Packet Oral QDAY PRN Barak Raymond M.D.        And   • magnesium hydroxide (MILK OF MAGNESIA) suspension 30 mL  30 mL Oral QDAY PRN Barak Raymond M.D.        And   • bisacodyl (DULCOLAX) suppository 10 mg  10 mg Rectal QDAY PRN Barak Raymond M.D.       • acetaminophen (TYLENOL) tablet 650 mg  650 mg Oral Q6HRS PRN Barak Raymond M.D.       • labetalol (NORMODYNE/TRANDATE) injection 10 mg  10 mg Intravenous Q4HRS PRN Barak Raymond M.D.       • enalaprilat (VASOTEC) injection 1.25 mg  1.25 mg Intravenous Q6HRS PRN Barak Raymond M.D.       • ondansetron (ZOFRAN) syringe/vial injection 4 mg  4 mg Intravenous Q4HRS PRN Barak Raymond M.D.    4 mg at 06/26/20 1233   • ondansetron (ZOFRAN ODT) dispertab 4 mg  4 mg Oral Q4HRS PRN Barak Raymond M.D.       • promethazine (PHENERGAN) tablet 12.5-25 mg  12.5-25 mg Oral Q4HRS PRN Barak Raymond M.D.   25 mg at 06/26/20 1308   • promethazine (PHENERGAN) suppository 12.5-25 mg  12.5-25 mg Rectal Q4HRS PRN Barak Raymond M.D.       • prochlorperazine (COMPAZINE) injection 5-10 mg  5-10 mg Intravenous Q4HRS PRN Barak Raymond M.D.       • insulin regular (HUMULIN R) injection 2-9 Units  2-9 Units Subcutaneous 4X/DAY RENEE Raymond M.D.   Stopped at 06/26/20 1700    And   • glucose 4 g chewable tablet 16 g  16 g Oral Q15 MIN PRN Barak Raymond M.D.        And   • dextrose 50% (D50W) injection 50 mL  50 mL Intravenous Q15 MIN PRN Barak Raymond M.D.       • carvedilol (COREG) tablet 3.125 mg  3.125 mg Oral BID WITH MEALS Barak Raymond M.D.   Stopped at 06/26/20 1730   • insulin glargine (Lantus) injection  15 Units Subcutaneous PM INSULIN Barak Raymond M.D.   15 Units at 06/26/20 1719   • lisinopril (PRINIVIL) tablet 2.5 mg  2.5 mg Oral DAILY Barak Raymond M.D.   Stopped at 06/26/20 1230   • [START ON 6/27/2020] prasugrel (EFFIENT) tablet 10 mg  10 mg Oral DAILY Barak Raymond M.D.           Allergies  Allergies   Allergen Reactions   • Ranexa [Ranolazine]      Dropped heart rate low and worsened lightheadedness.    • Statins [Hmg-Coa-R Inhibitors]      Muscle aches.       Vital Signs last 24 hours  Temp:  [36.4 °C (97.5 °F)-36.8 °C (98.3 °F)] 36.4 °C (97.5 °F)  Pulse:  [] 66  Resp:  [11-67] 15  BP: ()/(35-85) 114/57  SpO2:  [98 %-100 %] 98 %    Physical Exam  Physical Exam  Constitutional:       General: He is not in acute distress.  HENT:      Mouth/Throat:      Mouth: Mucous membranes are moist.   Eyes:      Extraocular Movements: Extraocular movements intact.      Pupils: Pupils are equal, round, and reactive to light.   Neck:      Musculoskeletal: Neck supple.   Cardiovascular:      Rate and Rhythm:  Regular rhythm.      Heart sounds: No murmur. No friction rub. No gallop.    Pulmonary:      Effort: Pulmonary effort is normal. No respiratory distress.      Breath sounds: Normal breath sounds.   Abdominal:      General: Abdomen is flat.      Palpations: Abdomen is soft.   Musculoskeletal:         General: No swelling.   Skin:     General: Skin is dry.   Neurological:      General: No focal deficit present.      Mental Status: He is alert and oriented to person, place, and time.      Cranial Nerves: No cranial nerve deficit.   Psychiatric:         Mood and Affect: Mood normal.         Behavior: Behavior normal.         Fluids    Intake/Output Summary (Last 24 hours) at 6/26/2020 1833  Last data filed at 6/26/2020 1800  Gross per 24 hour   Intake 914.58 ml   Output 300 ml   Net 614.58 ml       Laboratory  Recent Results (from the past 48 hour(s))   ACCU-CHEK GLUCOSE    Collection Time: 06/26/20  9:16 AM   Result Value Ref Range    Glucose - Accu-Ck 129 (H) 65 - 99 mg/dL   POC ACT (resulted by nursing)    Collection Time: 06/26/20 11:45 AM   Result Value Ref Range    Istat Activated Clotting Time 279 (H) 74 - 137 sec   CBC with Differential    Collection Time: 06/26/20  2:49 PM   Result Value Ref Range    WBC 4.0 (L) 4.8 - 10.8 K/uL    RBC 3.94 (L) 4.70 - 6.10 M/uL    Hemoglobin 11.5 (L) 14.0 - 18.0 g/dL    Hematocrit 35.0 (L) 42.0 - 52.0 %    MCV 88.8 81.4 - 97.8 fL    MCH 29.2 27.0 - 33.0 pg    MCHC 32.9 (L) 33.7 - 35.3 g/dL    RDW 46.5 35.9 - 50.0 fL    Platelet Count 187 164 - 446 K/uL    MPV 9.4 9.0 - 12.9 fL    Neutrophils-Polys 50.50 44.00 - 72.00 %    Lymphocytes 38.90 22.00 - 41.00 %    Monocytes 7.30 0.00 - 13.40 %    Eosinophils 2.50 0.00 - 6.90 %    Basophils 0.50 0.00 - 1.80 %    Immature Granulocytes 0.30 0.00 - 0.90 %    Nucleated RBC 0.00 /100 WBC    Neutrophils (Absolute) 2.00 1.82 - 7.42 K/uL    Lymphs (Absolute) 1.54 1.00 - 4.80 K/uL    Monos (Absolute) 0.29 0.00 - 0.85 K/uL    Eos (Absolute) 0.10  0.00 - 0.51 K/uL    Baso (Absolute) 0.02 0.00 - 0.12 K/uL    Immature Granulocytes (abs) 0.01 0.00 - 0.11 K/uL    NRBC (Absolute) 0.00 K/uL   Magnesium    Collection Time: 06/26/20  2:49 PM   Result Value Ref Range    Magnesium 1.7 1.5 - 2.5 mg/dL   Comp Metabolic Panel (CMP)    Collection Time: 06/26/20  2:49 PM   Result Value Ref Range    Sodium 140 135 - 145 mmol/L    Potassium 5.3 3.6 - 5.5 mmol/L    Chloride 108 96 - 112 mmol/L    Co2 19 (L) 20 - 33 mmol/L    Anion Gap 13.0 7.0 - 16.0    Glucose 87 65 - 99 mg/dL    Bun 12 8 - 22 mg/dL    Creatinine 0.78 0.50 - 1.40 mg/dL    Calcium 8.8 8.5 - 10.5 mg/dL    AST(SGOT) 25 12 - 45 U/L    ALT(SGPT) 10 2 - 50 U/L    Alkaline Phosphatase 56 30 - 99 U/L    Total Bilirubin 0.4 0.1 - 1.5 mg/dL    Albumin 3.6 3.2 - 4.9 g/dL    Total Protein 6.5 6.0 - 8.2 g/dL    Globulin 2.9 1.9 - 3.5 g/dL    A-G Ratio 1.2 g/dL   ESTIMATED GFR    Collection Time: 06/26/20  2:49 PM   Result Value Ref Range    GFR If African American >60 >60 mL/min/1.73 m 2    GFR If Non African American >60 >60 mL/min/1.73 m 2       Imaging  OUTSIDE IMAGES-CT HEAD   Final Result      IR-CAROTID-CEREBRAL BILATERAL    (Results Pending)       Assessment/Plan  * Carotid stenosis, right  Assessment & Plan  Status post stent  Serial neurologic exams  SBP goal less than 150 mmHg  Effient    Coronary artery disease of native artery of native heart with stable angina pectoris (HCC)- (present on admission)  Assessment & Plan  Asymptomatic  Continue outpatient CAD regimen  Aspirin, Coreg, lisinopril    Diabetes mellitus with neurological manifestations (CMS-HCC)- (present on admission)  Assessment & Plan  Moderate glycemic control with insulin therapy      Discussed patient condition and risk of morbidity and/or mortality with RN, Patient and Dr. Seo.    The patient remains critically ill,  I have assessed and reassessed the blood pressure,  cardiovascular status, labs and response to interventions. This patient  remains at high risk for significant clinical decompensation requiring frequent neurologic and hemodynamic evaluations necessitating neuro intensive care unit management.     excludes procedures.

## 2020-06-26 NOTE — OR NURSING
Assume care for pt in pre-op. Patient allergies and NPO status verified. Belongings in [facility] and the [facility]. Patient verbalizes understanding of pain scale, expected course of stay and plan of care. Surgical site verified with patient. IV access established. fbs = 129.Call light within reach. No further needs at this time. Hourly rounding in place.

## 2020-06-27 VITALS
OXYGEN SATURATION: 100 % | TEMPERATURE: 97.1 F | DIASTOLIC BLOOD PRESSURE: 63 MMHG | SYSTOLIC BLOOD PRESSURE: 141 MMHG | BODY MASS INDEX: 24.99 KG/M2 | WEIGHT: 164.9 LBS | HEART RATE: 52 BPM | RESPIRATION RATE: 22 BRPM | HEIGHT: 68 IN

## 2020-06-27 LAB
CHOLEST SERPL-MCNC: 147 MG/DL (ref 100–199)
GLUCOSE BLD-MCNC: 107 MG/DL (ref 65–99)
HDLC SERPL-MCNC: 21 MG/DL
LDLC SERPL CALC-MCNC: 100 MG/DL
TRIGL SERPL-MCNC: 132 MG/DL (ref 0–149)

## 2020-06-27 PROCEDURE — 700102 HCHG RX REV CODE 250 W/ 637 OVERRIDE(OP): Performed by: RADIOLOGY

## 2020-06-27 PROCEDURE — 97165 OT EVAL LOW COMPLEX 30 MIN: CPT

## 2020-06-27 PROCEDURE — 700102 HCHG RX REV CODE 250 W/ 637 OVERRIDE(OP): Performed by: HOSPITALIST

## 2020-06-27 PROCEDURE — G0378 HOSPITAL OBSERVATION PER HR: HCPCS

## 2020-06-27 PROCEDURE — 700105 HCHG RX REV CODE 258: Performed by: NURSE PRACTITIONER

## 2020-06-27 PROCEDURE — 82962 GLUCOSE BLOOD TEST: CPT

## 2020-06-27 PROCEDURE — 80061 LIPID PANEL: CPT

## 2020-06-27 PROCEDURE — A9270 NON-COVERED ITEM OR SERVICE: HCPCS | Performed by: RADIOLOGY

## 2020-06-27 PROCEDURE — A9270 NON-COVERED ITEM OR SERVICE: HCPCS | Performed by: HOSPITALIST

## 2020-06-27 PROCEDURE — 97163 PT EVAL HIGH COMPLEX 45 MIN: CPT

## 2020-06-27 RX ORDER — PRASUGREL 10 MG/1
10 TABLET, FILM COATED ORAL DAILY
COMMUNITY
End: 2022-07-13

## 2020-06-27 RX ADMIN — PRASUGREL 10 MG: 10 TABLET, FILM COATED ORAL at 05:35

## 2020-06-27 RX ADMIN — SODIUM CHLORIDE: 9 INJECTION, SOLUTION INTRAVENOUS at 06:58

## 2020-06-27 RX ADMIN — ACETAMINOPHEN 650 MG: 325 TABLET, FILM COATED ORAL at 02:27

## 2020-06-27 RX ADMIN — ASPIRIN 81 MG: 81 TABLET, COATED ORAL at 05:30

## 2020-06-27 ASSESSMENT — COGNITIVE AND FUNCTIONAL STATUS - GENERAL
HELP NEEDED FOR BATHING: A LITTLE
DAILY ACTIVITIY SCORE: 23
CLIMB 3 TO 5 STEPS WITH RAILING: A LITTLE
MOBILITY SCORE: 23
SUGGESTED CMS G CODE MODIFIER MOBILITY: CI
SUGGESTED CMS G CODE MODIFIER DAILY ACTIVITY: CI

## 2020-06-27 ASSESSMENT — ACTIVITIES OF DAILY LIVING (ADL): TOILETING: INDEPENDENT

## 2020-06-27 ASSESSMENT — GAIT ASSESSMENTS
DISTANCE (FEET): 100
DEVIATION: DECREASED BASE OF SUPPORT
GAIT LEVEL OF ASSIST: SUPERVISED

## 2020-06-27 ASSESSMENT — LIFESTYLE VARIABLES: EVER_SMOKED: NEVER

## 2020-06-27 ASSESSMENT — PATIENT HEALTH QUESTIONNAIRE - PHQ9
SUM OF ALL RESPONSES TO PHQ9 QUESTIONS 1 AND 2: 0
2. FEELING DOWN, DEPRESSED, IRRITABLE, OR HOPELESS: NOT AT ALL
1. LITTLE INTEREST OR PLEASURE IN DOING THINGS: NOT AT ALL

## 2020-06-27 NOTE — ASSESSMENT & PLAN NOTE
Patient states that he has intolerance to statins  Check lipid panel  I recommended patient be evaluated for Zetia as an outpatient

## 2020-06-27 NOTE — ASSESSMENT & PLAN NOTE
60% stenosis of the right ICA found on Doppler study  Has a history of TIA and blurry vision, denies blacking out  Status post stent placement  Every1 hr neurochecks for 16 hours  Maintain blood pressure less than 140

## 2020-06-27 NOTE — CONSULTS
Critical Care Consultation    Date of consult: 6/26/2020    Referring Physician  Shoaib White M.D.    Reason for Consultation  I was asked to provide critical care consultation for right carotid stenosis s/p carotid stent placement.    History of Presenting Illness  60 y.o. male who presented 6/26/2020 with history of peripheral artery disease, CAD status post stent, diabetes mellitus, and hyperlipidemia.  He has been having intermittent episodes of blurry vision for several months which were associated with lightheadedness.  He was evaluated and found to have right carotid artery stenosis.  He was taken to the interventional radiology suite by Dr. Seo this afternoon.  A right carotid stent was placed and he has been delivered to the intensive care unit for further evaluation and postoperative management.    Code Status  Full Code    Review of Systems  Review of Systems   Constitutional: Negative.    HENT: Negative.    Eyes: Negative for blurred vision and double vision.   Respiratory: Negative.    Cardiovascular: Negative.    Gastrointestinal: Negative.    Genitourinary: Negative.    Musculoskeletal: Negative.    Neurological:        Lightheadedness   All other systems reviewed and are negative.      Past Medical History   has a past medical history of Arrhythmia, Arthritis, Bowel habit changes (06/10/2020), CAD (coronary artery disease), Congestive heart failure (HCC), DIABETES MELLITUS (06/19/2020), Dizziness, Fatigue, Gout, Heart disease, High cholesterol, Hypertension, Myocardial infarct (HCC) (2007), Other and unspecified angina pectoris, PAD (peripheral artery disease), and Pain.    Surgical History   has a past surgical history that includes carotid endarterectomy (Left, 5/20/2015); recovery (6/25/2015); and other cardiac surgery.    Family History  family history is not on file.    Social History   reports that he has been smoking pipe. He has smoked for the past 40.00 years. He has never used  smokeless tobacco. He reports that he does not drink alcohol or use drugs.    Medications  Home Medications     Reviewed by Ana Shah R.N. (Registered Nurse) on 06/26/20 at 1047  Med List Status: Complete   Medication Last Dose Status   aspirin (ASA) 81 MG CHEW chewable tablet 6/26/2020 Active   B Complex Vitamins (VITAMIN-B COMPLEX PO) >1 week ago Active   carvedilol (COREG) 3.125 MG Tab 6/25/2020 Active   diazepam (VALIUM) 2 MG TABS 6/26/2020 Active   insulin glargine (LANTUS) 100 UNIT/ML SOLN 6/25/2020 Active   lisinopril (PRINIVIL) 2.5 MG Tab 6/25/2020 Active   magnesium oxide (MAG-OX) 400 MG Tab >1 week ago Active   nitroglycerin (NITROSTAT) 0.4 MG SL Tab >2 weeks ago Active   NON SPECIFIED  Active   NON SPECIFIED 6/26/2020 Active   vitamin D, Ergocalciferol, (DRISDOL) 1.25 MG (01519 UT) Cap capsule 6/25/2020 Active              Current Facility-Administered Medications   Medication Dose Route Frequency Provider Last Rate Last Dose   • NS infusion   Intravenous Continuous CYNTHIA OdellPPatriciaNPatricia       • [START ON 6/27/2020] aspirin EC (ECOTRIN) tablet 81 mg  81 mg Oral DAILY Shoaib White M.D.       • [START ON 6/27/2020] clopidogrel (PLAVIX) tablet 75 mg  75 mg Oral DAILY Shoaib White M.D.       • senna-docusate (PERICOLACE or SENOKOT S) 8.6-50 MG per tablet 2 Tab  2 Tab Oral BID Barak Raymond M.D.        And   • polyethylene glycol/lytes (MIRALAX) PACKET 1 Packet  1 Packet Oral QDAY PRN Barak Raymond M.D.        And   • magnesium hydroxide (MILK OF MAGNESIA) suspension 30 mL  30 mL Oral QDAY PRN Barak Raymond M.D.        And   • bisacodyl (DULCOLAX) suppository 10 mg  10 mg Rectal QDAY PRN Barak Raymond M.D.       • acetaminophen (TYLENOL) tablet 650 mg  650 mg Oral Q6HRS PRN Hamad Mandi, M.D.       • labetalol (NORMODYNE/TRANDATE) injection 10 mg  10 mg Intravenous Q4HRS PRN Barak Raymond M.D.       • enalaprilat (VASOTEC) injection 1.25 mg  1.25 mg Intravenous Q6HRS PRN Barak Raymond,  M.D.       • ondansetron (ZOFRAN) syringe/vial injection 4 mg  4 mg Intravenous Q4HRS PRN Barak Raymond M.D.   4 mg at 06/26/20 1233   • ondansetron (ZOFRAN ODT) dispertab 4 mg  4 mg Oral Q4HRS PRN Barak Raymond M.D.       • promethazine (PHENERGAN) tablet 12.5-25 mg  12.5-25 mg Oral Q4HRS PRN Barak Raymond M.D.       • promethazine (PHENERGAN) suppository 12.5-25 mg  12.5-25 mg Rectal Q4HRS PRN Barak Raymond M.D.       • prochlorperazine (COMPAZINE) injection 5-10 mg  5-10 mg Intravenous Q4HRS PRN Baark Raymond M.D.       • insulin regular (HUMULIN R) injection 2-9 Units  2-9 Units Subcutaneous 4X/DAY ACHSOFÍA Raymond M.D.        And   • glucose 4 g chewable tablet 16 g  16 g Oral Q15 MIN PRN Barak Raymond M.D.        And   • dextrose 50% (D50W) injection 50 mL  50 mL Intravenous Q15 MIN PRN Barak Raymond M.D.       • carvedilol (COREG) tablet 3.125 mg  3.125 mg Oral BID WITH MEALS Barak Raymond M.D.       • insulin glargine (Lantus) injection  15 Units Subcutaneous DAILY Barak Raymond M.D.       • lisinopril (PRINIVIL) tablet 2.5 mg  2.5 mg Oral DAILY Barak Raymond M.D.           Allergies  Allergies   Allergen Reactions   • Ranexa [Ranolazine]      Dropped heart rate low and worsened lightheadedness.    • Statins [Hmg-Coa-R Inhibitors]      Muscle aches.       Vital Signs last 24 hours  Temp:  [36.8 °C (98.3 °F)] 36.8 °C (98.3 °F)  Pulse:  [] 91  Resp:  [11-19] 17  BP: ()/(35-85) 87/35  SpO2:  [100 %] 100 %    Physical Exam  Physical Exam    Fluids  No intake or output data in the 24 hours ending 06/26/20 1241    Laboratory  Recent Results (from the past 48 hour(s))   ACCU-CHEK GLUCOSE    Collection Time: 06/26/20  9:16 AM   Result Value Ref Range    Glucose - Accu-Ck 129 (H) 65 - 99 mg/dL   POC ACT (resulted by nursing)    Collection Time: 06/26/20 11:45 AM   Result Value Ref Range    Istat Activated Clotting Time 279 (H) 74 - 137 sec       Imaging  {Wetread or  Wildcard:[id number]}    Assessment/Plan  No new Assessment & Plan notes have been filed under this hospital service since the last note was generated.  Service: Pulmonary      Discussed patient condition and risk of morbidity and/or mortality with {Discussed with...:200210}.    The patient remains critically ill.  Critical care time = *** minutes in directly providing and coordinating critical care and extensive data review.  No time overlap and excludes procedures.

## 2020-06-27 NOTE — DISCHARGE INSTRUCTIONS
Physician Discharge Instructions:  Discharge Diagnosis: Carotid stenting  Proceed to discharge/transfer  to Home  Take Rx/Prescriptions as prescribed and reconciled per AVS  For OTC Medication consult with your Pharmacist first.  Diet: Cardiac, Diabetic  Activity: Low level, no lifting  F/u with PCP within 3-10 days at home location, ask for f/u prescriptions by PCP  F/u with Specialty MD: KARLO DAWSON as scheduled  For new, severe symptoms refer to the next Emergency Care or call your Physician.  Controlled Substance History was reviewed if new Rx was issued.    Jhonathan Terry MD    Discharge Instructions    Discharged to home by car with relative. Discharged via wheelchair, hospital escort: Yes.  Special equipment needed: Not Applicable    Be sure to schedule a follow-up appointment with your primary care doctor or any specialists as instructed.     Discharge Plan:   Diet Plan: Discussed  Activity Level: Discussed    I understand that a diet low in cholesterol, fat, and sodium is recommended for good health. Unless I have been given specific instructions below for another diet, I accept this instruction as my diet prescription.   Other diet: Cardiac, diabetic     · Is patient discharged on Warfarin / Coumadin?   No     Depression / Suicide Risk    As you are discharged from this [facility] facility, it is important to learn how to keep safe from harming yourself.    Recognize the warning signs:  · Abrupt changes in personality, positive or negative- including increase in energy   · Giving away possessions  · Change in eating patterns- significant weight changes-  positive or negative  · Change in sleeping patterns- unable to sleep or sleeping all the time   · Unwillingness or inability to communicate  · Depression  · Unusual sadness, discouragement and loneliness  · Talk of wanting to die  · Neglect of personal appearance   · Rebelliousness- reckless behavior  · Withdrawal from people/activities they  love  · Confusion- inability to concentrate     If you or a loved one observes any of these behaviors or has concerns about self-harm, here's what you can do:  · Talk about it- your feelings and reasons for harming yourself  · Remove any means that you might use to hurt yourself (examples: pills, rope, extension cords, firearm)  · Get professional help from the community (Mental Health, Substance Abuse, psychological counseling)  · Do not be alone:Call your Safe Contact- someone whom you trust who will be there for you.  · Call your local CRISIS HOTLINE 583-3483 or 695-851-7702  · Call your local Children's Mobile Crisis Response Team [place] (835) 350-2753 or www.Liveset  · Call the toll free National Suicide Prevention Hotlines   · National Suicide Prevention Lifeline 207-611-HXNP (9642)  · National Hope Line Network 800-SUICIDE (827-8661)

## 2020-06-27 NOTE — ASSESSMENT & PLAN NOTE
Start on insulin sliding scale with serial Accu-Checks  Check hemoglobin A1c  Hypoglycemic protocol in place  Lantus 15 units

## 2020-06-27 NOTE — PROGRESS NOTES
1200: Discharge instructions reviewed. Pt discharged home with wife, Marisa. All belongings with patient.

## 2020-06-27 NOTE — PROGRESS NOTES
Dr. Seo notified of Dr. Wade's request for discharge home. Per Dr. Seo's instructions, discharge home on Effient and ASA, and to follow-up in his office in 2-4 weeks. Dr. Terry notified for patient's discharge paperwork.

## 2020-06-28 PROCEDURE — 99217 PR OBSERVATION CARE DISCHARGE: CPT | Performed by: HOSPITALIST

## 2020-06-28 NOTE — THERAPY
Physical Therapy   Initial Evaluation     Patient Name: Eb Poon  Age:  60 y.o., Sex:  male  Medical Record #: [id number]  Today's Date: 6/27/2020     Precautions: Cardiac Precautions (See Comments)    Assessment  Pt presents post right carotid stent with impaired activity tolerance and hemodynamics. Pt with subjectively low EF to 20% (no read on file) causing w/c mobility mostly at home; denies prior syncope but was unable to perform stair assessment due to hypotension; HR to 40s post ambulation and betablocker has been held for 2 days; anticipate pt is at baseline given LOS and procedure however highly encouraged follow up for cardiac management/transplant given severity of hypotension and clear failure to thrive over the last year; does not require physical therapy intervention to improve though is a fall risk from hemodynamics. Recommend home discharge when medically stable to do so would benefit from outpatient cardiac rehab referral.       Plan    Recommend Physical Therapy for Evaluation only     Abridged Subjective/Objective     06/27/20 1050   Prior Living Situation   Prior Services None   Housing / Facility 2 Story House   Steps Into Home 1   Steps In Home 10   Bathroom Set up Walk In Shower   Equipment Owned Front-Wheel Walker   Lives with - Patient's Self Care Capacity Spouse   Comments wife assists with 'everything' denies falls however has had progressively lower EF over last few years    Prior Level of Functional Mobility   Bed Mobility Independent   Transfer Status Independent   Ambulation Independent   Distance Ambulation (Feet)   (~ 25 ft )   Assistive Devices Used None   Wheelchair Independent   Stairs Independent   Gait Analysis   Gait Level Of Assist Supervised   Assistive Device None   Distance (Feet) 100   # of Times Distance was Traveled 1   Deviation Decreased Base Of Support   Comments pt needed to sit down after ambulation, could not perform stairs due to BP but anticipate can  functionally perform; discussed staying downstairs until needed..would wait at least 72 hours;    Bed Mobility    Supine to Sit Modified Independent   Sit to Supine Modified Independent   Functional Mobility   Sit to Stand Supervised   Education Group   Additional Comments discussed BB held here and to discuss with physician necessity of taking given HR in 40s after ambulation, BP drop 30 points to 90s/50s after walk;

## 2020-06-28 NOTE — DISCHARGE SUMMARY
Discharge Summary    CHIEF COMPLAINT ON ADMISSION  No chief complaint on file.      Reason for Admission  RECOVERY ONLY     Admission Date  6/26/2020    CODE STATUS  Full code    HPI & HOSPITAL COURSE  This is a 60 y.o. male here with history of extensive vasculopathy, history of diabetes, systolic heart failure, coronary artery disease status post prior stenting, dyslipidemia, peripheral arterial disease, was admitted to the hospital for elective right carotid stent placement for stenosis, the patient was attended to by interventional radiology and had the procedure performed without noticeable difficulty, he apparently has had lightheadedness as well as blurry vision with visual changes prior to his procedure.  He was identified to have a high-grade stenosis, the patient was monitored closely in intensive care to assure hemodynamic stability and neurologic stability which was carried out and the patient had no difficulties.  The patient was cleared for discharge by Dr. White, the interventional radiologist perform the procedure on 6/27/2020.  The patient had all his medications reviewed and is to continue his dual antiplatelet therapy that he was previously prescribed in form of aspirin and Effient.  The patient is discharged in a medically improved condition for close outpatient follow-up, he did have a follow-up appointment with IR interventional department scheduled.       Therefore, he is discharged in fair and stable condition to home with close outpatient follow-up.    The patient recovered much more quickly than anticipated on admission.    Discharge Date  6/27/2020    FOLLOW UP ITEMS POST DISCHARGE  Follow-up with the interventional radiology, his regular PCP and cardiology as scheduled, the patient is considered for PCSK9 therapy    DISCHARGE DIAGNOSES  Principal Problem:    Carotid stenosis, right POA: Unknown  Active Problems:    Diabetes mellitus with neurological manifestations (CMS[facility]) POA:  Yes    Coronary artery disease of native artery of native heart with stable angina pectoris (HCC) POA: Yes      Overview: 2007 LAD 95%, followed by 70% stenoses. Stented both areas. Circumflex       80%, followed by 80%, both areas stented.    Dyslipidemia POA: Yes    Ischemic cardiomyopathy POA: Yes      Overview: February 2015: Ejection fraction 55%. No infarct pattern seen. Prior       inferior infarct, on MPI.   Resolved Problems:    * No resolved hospital problems. *      FOLLOW UP    Shoaib White M.D.  [address] [facility]  [address]  [place] [address]  [phone]    In 2 weeks      Claudia Alcocer M.D.  [address] [place] [address] [address]  [place] [address] [phone]            MEDICATIONS ON DISCHARGE     Medication List      CHANGE how you take these medications      Instructions   diazePAM 2 MG Tabs  What changed:    · how much to take  · when to take this  Commonly known as:  VALIUM   Take 1 Tab by mouth every 6 hours as needed for Anxiety.  Dose:  2 mg        CONTINUE taking these medications      Instructions   aspirin 81 MG Chew chewable tablet  Commonly known as:  ASA   Take 81 mg by mouth every day.  Dose:  81 mg     carvedilol 3.125 MG Tabs  Commonly known as:  COREG   Take 3.125 mg by mouth 2 times a day, with meals.  Dose:  3.125 mg     insulin glargine 100 UNIT/ML Soln  Commonly known as:  Lantus   Inject 15 Units as instructed every day.  Dose:  15 Units     lisinopril 2.5 MG Tabs  Commonly known as:  PRINIVIL   Take 2.5 mg by mouth every day.  Dose:  2.5 mg     magnesium oxide 400 MG Tabs tablet  Commonly known as:  MAG-OX   Take 400 mg by mouth every day.  Dose:  400 mg     nitroglycerin 0.4 MG Subl  Commonly known as:  NITROSTAT   Place 1 Tab under tongue as needed for Chest Pain.  Dose:  0.4 mg     NON SPECIFIED      prasugrel 10 MG Tabs  Commonly known as:  EFFIENT   Take 10 mg by mouth every day.  Dose:  10 mg     vitamin D (Ergocalciferol) 1.25 MG (11258 UT) Caps  capsule  Commonly known as:  DRISDOL   Take 50,000 Units by mouth every 7 days.  Dose:  50,000 Units     VITAMIN-B COMPLEX PO   Take  by mouth.            Allergies  Allergies   Allergen Reactions   • Ranexa [Ranolazine]      Dropped heart rate low and worsened lightheadedness.    • Statins [Hmg-Coa-R Inhibitors]      Muscle aches.       DIET  Strictly diabetic, cardiac, low-cholesterol  ACTIVITY  As tolerated.  Weight bearing as tolerated  The patient to exercise precaution post angiography, no heavy lifting, no heavy exercise or activity for the next 6 weeks  CONSULTATIONS  Pulmonary critical care    PROCEDURES  Right carotid stenting with use of embolic protection device    LABORATORY  Lab Results   Component Value Date    SODIUM 140 06/26/2020    POTASSIUM 5.3 06/26/2020    CHLORIDE 108 06/26/2020    CO2 19 (L) 06/26/2020    GLUCOSE 87 06/26/2020    BUN 12 06/26/2020    CREATININE 0.78 06/26/2020        Lab Results   Component Value Date    WBC 4.0 (L) 06/26/2020    HEMOGLOBIN 11.5 (L) 06/26/2020    HEMATOCRIT 35.0 (L) 06/26/2020    PLATELETCT 187 06/26/2020        Total time of the discharge process exceeds 55 minutes.

## 2020-06-30 NOTE — PROGRESS NOTES
Pt called no call IR number with complaints of visual changes. Underwent uncomplicated Right ICA stent placement on 6/26.    States since yesterday he thinks his pupils are not dilating and light is too bright. States his wife has looked at his pupils and thinks they are not gissel with daylight. Denies visual field cuts. Blurred vision in both eyes but baseline for him due to cataracts, worse on left. Rode to [place] yesterday to see his EP MD (Leigh) yesterday for irregular HR and car ride was very difficult for him.      Denies headache. + lightheadedness when sitting up but this is at baseline. Has been taking prasugrel and aspirin as prescribed but has not taken yet today. Did not start on new medication upon discharge. AM , /57, HR 54. O2 on home sat not working this am, was as low as 80 yesterday but returned to normal. Pt states the equipment is unreliable due to irregular HR.     Discussion with Rayray White MD regarding above symptoms. Has recommended pt present to hospital to r/o stroke or January's Syndome. Pt has neurologist in [place] so recommend [facility], which he states is 20 minutes away for him. On call number for Dr. White provided to pt for any questions/ concerns from ERP.

## 2020-07-01 ENCOUNTER — HOSPITAL ENCOUNTER (OUTPATIENT)
Dept: RADIOLOGY | Facility: MEDICAL CENTER | Age: 60
End: 2020-07-01
Payer: MEDICARE

## 2020-08-10 ENCOUNTER — HOSPITAL ENCOUNTER (OUTPATIENT)
Dept: RADIOLOGY | Facility: MEDICAL CENTER | Age: 60
End: 2020-08-10
Attending: NURSE PRACTITIONER
Payer: MEDICARE

## 2020-08-10 DIAGNOSIS — I65.23 STENOSIS OF BOTH CAROTID ARTERIES WITHOUT CEREBRAL INFARCTION: ICD-10-CM

## 2020-08-10 ASSESSMENT — LIFESTYLE VARIABLES: SUBSTANCE_ABUSE: 1

## 2020-08-10 ASSESSMENT — ENCOUNTER SYMPTOMS
BLURRED VISION: 1
FOCAL WEAKNESS: 0
WEIGHT LOSS: 0
DIZZINESS: 1
PHOTOPHOBIA: 1
SENSORY CHANGE: 1
EYE REDNESS: 1
WEAKNESS: 1

## 2020-08-10 NOTE — CONSULTS
Neuro Interventional Service Consultation      Re: Eb Poon     MRN: [id number]   : 1960    Eb Poon was referred to our service by Topher Bynum MD. He is a 60 y.o. male seen in clinic for follow up after carotid stent placement. He is also under the care of Claudia Alcocer MD.    History of Present Illness:   has a history of vascular disease with interventions that include a left carotid endarterectomy and bilateral iliac stents. He was initially referred to our practice in  for left vertebral artery occlusion and right vertebral artery stenosis and drop attacks that developed in  . 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 During the angiogram workup, he was noted to have 50-60% stenosis of the right ICA. He was admitted to [facility] in April and underwent stenting of the circumflex artery. Clinical notes indicate he was a potential Plavix nonresponder and was placed on ticagrelor and later switched to prasugrel. CTA to rule out TIA showed a progression of the right ICA stenosis to 90% and he was referred for evaluation of this finding. He underwent an uncomplicated Right carotid artery stent placement on  and discharged to home on . On  he contacted our office with complaints of visual changes that include blurred vision and photophobia in both eyes. He thought both of his pupils were not reacting to light. We advised him to present to ED, and did go and get evaluated, but there were not acute findings noted on the workup. We obtained his imaging and Dr. White also contacted him for discussion. Additional clinical history includes ongoing tobacco dependence (smokes a pipe), ischemic cardiomyopathy, dyslipidemia, anxiety, and diabetes mellitus. Since his stent placement,  he has had interval cataract surgery with improved visual acuity.    Today, he reports no improvement in his symptoms since the stent was placed. Worst dizziness, tinnitus, and weakness is when he looks up. He also has symptoms when he stands up quickly, and states he is unable to stand greater than 15 minutes at a time without sitting or reclining and flexing his head forward. The symptoms are greatly impacting his quality of life and cause him a great deal of anxiety. He is on prasugrel per his cardiologist. He states he is also having problems with chest discomfort and, per his report, a recent echocardiogram shows a decrease in his LVEF compared to prior. He has an appointment for this tomorrow. He continues to smoke. He is accompanied by his wife to the appointment today.    He is seen today for review of imaging studies and follow up after right carotid artery stent placement.     Past Medical History:   Diagnosis Date   • Arrhythmia    • Arthritis    • Bowel habit changes 06/10/2020    Constipation   • CAD (coronary artery disease)    • Congestive heart failure (HCC)    • DIABETES MELLITUS 06/19/2020    Insulin   • Dizziness    • Fatigue    • Gout    • Heart disease    • High cholesterol    • Hypertension    • Myocardial infarct (HCC) 2007    MI in 2010 and 2020   • Other and unspecified angina pectoris    • PAD (peripheral artery disease)    • Pain     back pain     Past Surgical History:   Procedure Laterality Date   • RECOVERY  6/25/2015    Procedure: VASCULAR CASE BYNUM-ABDOMINAL AORTOGRAM W/ RUNOFF, POSSIBLE INTERVENTION ICD 9: 433.10, 435.3, 443.9;  Surgeon: Radha Surgery;  Location: SURGERY [facility]POST [facility];  Service:    • CAROTID ENDARTERECTOMY Left 5/20/2015    Procedure: CAROTID ENDARTERECTOMY ;  Surgeon: Topher Bynum M.D.;  Location: SURGERY [facility];  Service:    • OTHER CARDIAC SURGERY      left, stent placement     Social History     Socioeconomic History   • Marital  status:      Spouse name: Not on file   • Number of children: Not on file   • Years of education: Not on file   • Highest education level: Not on file   Occupational History   • Not on file   Social Needs   • Financial resource strain: Not on file   • Food insecurity     Worry: Not on file     Inability: Not on file   • Transportation needs     Medical: Not on file     Non-medical: Not on file   Tobacco Use   • Smoking status: Current Every Day Smoker     Years: 40.00     Types: Pipe   • Smokeless tobacco: Never Used   Substance and Sexual Activity   • Alcohol use: No     Comment: quit 2.5 years ago   • Drug use: No   • Sexual activity: Not on file   Lifestyle   • Physical activity     Days per week: Not on file     Minutes per session: Not on file   • Stress: Not on file   Relationships   • Social connections     Talks on phone: Not on file     Gets together: Not on file     Attends Anabaptism service: Not on file     Active member of club or organization: Not on file     Attends meetings of clubs or organizations: Not on file     Relationship status: Not on file   • Intimate partner violence     Fear of current or ex partner: Not on file     Emotionally abused: Not on file     Physically abused: Not on file     Forced sexual activity: Not on file   Other Topics Concern   • Not on file   Social History Narrative   • Not on file     No family history on file.    Review of Systems   Constitutional: Negative for weight loss.   HENT: Positive for tinnitus.    Eyes: Positive for blurred vision, photophobia and redness (s/p cataract surgery).   Cardiovascular: Positive for chest pain.        Positive for near syncope   Neurological: Positive for dizziness, sensory change (tingling) and weakness. Negative for focal weakness.   Psychiatric/Behavioral: Positive for substance abuse (tobacco).     A comprehensive 14-point review of systems was negative except as described above.     Labs:      Ref. Range 7/15/2019 09:10    Platelet Count Latest Ref Range: 164 - 446 K/uL 196   Creatinine Latest Ref Range: 0.50 - 1.40 mg/dL 1.00   GFR If  Latest Ref Range: >60 mL/min/1.73 m 2 >60   GFR If Non  Latest Ref Range: >60 mL/min/1.73 m 2 >60   INR Latest Ref Range: 0.87 - 1.13  0.94   PT Latest Ref Range: 12.0 - 14.6 sec 12.7       Radiology:   CTA head and neck July 1, 2020 at [facility]:    CTA head and neck  April 24, 2020 at [facility]:      Endovascular neurointervention June 26, 2020 at [facility]:  1.  An approximately 70-80% stenosis in the proximal portion of the right internal carotid artery. Successful right internal carotid artery stent placement without any immediate convocation.  2.  An approximately 50% fixed stenosis in the right vertebral artery at the level of C2. The stenosis is not changed in right lateral and left lateral positions.  3.  Moderate stenosis in the right internal carotid artery at the skull base and petrous portion.  4.  No significant stenosis at the origin of the left internal carotid artery.  5.  Widely patent left subclavian stent.  6.  Widely patent bilateral iliac stents.          Physician directed carotid/vertebral ultrasound July 31, 2019 at [facility]:  1.  There is antegrade flow in the dominant right vertebral artery. There is significant reduction in the peak systolic velocity during neck extension, right lateral, left lateral and flexion position when compared with the neutral position. The catheter   angiogram done on 10/15/2019 demonstrates an approximately 70% stenosis at the level of C1-C2 during left lateral position. 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     2. Increased peak systolic velocity of right internal carotid artery with atherosclerotic plaque consistent with 50-70% stenosis. This is in keeping with the diagnosis of 60% stenosis in the proximal  right internal carotid artery on the recent catheter   Angiogram.    Catheter angiogram on July 15, 2019 at [facility]:  1.  There is focal 70% stenosis at the single dominant right vertebral artery at the level of C2 during left lateral position of the neck. The stenosis is only visualized on the left lateral position of the neck. This finding is concerning for positional   stenosis of the vertebral artery. However there is antegrade flow seen during left lateral position. There is no occlusion.  2.  An approximately 50-60% stenosis in the proximal right internal artery.  3.  50% stenosis in the distal cervical segment of the right internal carotid artery.  4.  Irregular contour of the petrous portion of the right internal carotid artery. The differential diagnosis includes atherosclerotic disease and fibromuscular dysplasia.  5.  An approximately 70% stenosis at the midportion of the proximal left subclavian artery.  6.  Nonvisualization of the right vertebral artery.  7.  Patent bilateral common and external iliac stents.    Current Outpatient Medications   Medication Sig Dispense Refill   • prasugrel (EFFIENT) 10 MG Tab Take 10 mg by mouth every day.     • vitamin D, Ergocalciferol, (DRISDOL) 1.25 MG (96458 UT) Cap capsule Take 50,000 Units by mouth every 7 days.     • carvedilol (COREG) 3.125 MG Tab Take 3.125 mg by mouth 2 times a day, with meals.     • NON SPECIFIED      • lisinopril (PRINIVIL) 2.5 MG Tab Take 2.5 mg by mouth every day.     • magnesium oxide (MAG-OX) 400 MG Tab Take 400 mg by mouth every day.     • B Complex Vitamins (VITAMIN-B COMPLEX PO) Take  by mouth.     • nitroglycerin (NITROSTAT) 0.4 MG SL Tab Place 1 Tab under tongue as needed for Chest Pain. 25 Tab 11   • diazepam (VALIUM) 2 MG TABS Take 1 Tab by mouth every 6 hours as needed for Anxiety. (Patient taking differently: Take 1 mg by mouth as needed for Anxiety.) 30 Tab 0   • aspirin (ASA) 81 MG CHEW chewable tablet Take 81 mg by mouth every  day. 180 Tab 3   • insulin glargine (LANTUS) 100 UNIT/ML SOLN Inject 15 Units as instructed every day. 10 mL 3     No current facility-administered medications for this encounter.        Allergies   Allergen Reactions   • Ranexa [Ranolazine]      Dropped heart rate low and worsened lightheadedness.    • Statins [Hmg-Coa-R Inhibitors]      Muscle aches.       Physical Exam   Constitutional: He is oriented to person, place, and time and well-developed, well-nourished, and in no distress. No distress.   Eyes: Right eye exhibits no discharge. Left eye exhibits no discharge. Left conjunctiva has a hemorrhage. No scleral icterus.   Pulmonary/Chest: Effort normal. No respiratory distress.   Abdominal: Soft.   Neurological: He is alert and oriented to person, place, and time. He has normal sensation and normal strength. He is not agitated and not disoriented. He displays no weakness, no tremor, facial symmetry, normal stance and normal speech. Coordination and gait normal.   In a wheelchair for appointment   Skin: Skin is warm and dry. No rash noted. He is not diaphoretic. No erythema. No pallor.   Psychiatric: Mood, memory, affect and judgment normal.     Impression:   1. Right internal carotid artery 90% stenosis, status post stent placement, on prasugrel and aspirin.  2. Vertebral artery stenosis 50%, stable.  3. Vertebrobasilar insufficiency.  4. Left vertebral artery occlusion.   5. Left subclavian artery stenosis, status post stent placement.  6. Coronary artery disease, status post circumflex stent placement 4/2020.  7.  Peripheral arterial disease.  8.  Tobacco dependence.  9.  Diabetes mellitus.  10. Hypertension.  11. History of myocardial infarction.    Plan:   Shoaib White MD has reviewed 's history and imaging studies, examined the patient, and discussed follow up. In regards to the carotid artery stent, we will arrange a carotid ultrasound in 6 months. Barring any worrisome findings, we will  then perform an angiogram in 1 year to evaluate his multiple stents. His complaints of ongoing dizziness and activity intolerance are nonspecific and are potentially related to vertebrobasilar insufficiency, however we encouraged him to discuss this with his cardiologist at his appointment tomorrow as well, as dizziness can have multiple causes. In terms of vertebral artery stenosis, we would not recommend intervention unless he completely stops smoking. After a second angiogram we were unable to replicate Bowhunter's Syndrome with dynamic head movements. He expresses great concern over the vertebral stenosis and we explained that it is unchanged compared to the prior angiogram from 2019 and not progressing. He is on the recommended conservative dual antiplatelet therapy for this problem and we explained that an intracranial stent carries different risk than his other extracranial stents. Furthermore, his dizziness may not improve after stent placement. Should he quit smoking completely and wishes to move forward with intervention, he will call us. Otherwise, we will see him for an ultrasound and angiogram as planned.     OTTO Reid with Shoaib White MD  Neuro Interventional Service   [address] ([address])  [place] [address]  [phone]

## 2020-10-05 DIAGNOSIS — I65.03 STENOSIS OF BOTH VERTEBRAL ARTERIES: ICD-10-CM

## 2021-06-02 ENCOUNTER — TELEPHONE (OUTPATIENT)
Dept: CARDIOLOGY | Facility: MEDICAL CENTER | Age: 61
End: 2021-06-02

## 2021-06-02 NOTE — TELEPHONE ENCOUNTER
Recvd referral from [facility] for EP. This patient has [employer] - they will have to be referred to [facility]. I called and LM on Radha's voicemail with [facility] notifying her this patient will have to be referred to [facility] for his insurance to cover it.

## 2021-06-10 ENCOUNTER — TELEPHONE (OUTPATIENT)
Dept: RADIOLOGY | Facility: MEDICAL CENTER | Age: 61
End: 2021-06-10

## 2021-06-10 NOTE — TELEPHONE ENCOUNTER
"Patient contacted [facility] service asking for appt for acute complaints. Message received, attempted to call back. Pt indicates he is having \"serious problems, and just had another attack.\" Unclear what that means to the patient based on VM. No answer on call back, LM for pt to present to his ED for serious acute complaints. Has multiple vascular comorbidities.     At last appt, plan was to arrange catheter angiogram in July 2021 at an in-network facility of his choice, [facility] or [facility]. Also asked pt to CB with his facility of choice so that we can send order to follow his stents.   "

## 2021-07-27 DIAGNOSIS — I65.23 STENOSIS OF BOTH CAROTID ARTERIES WITHOUT CEREBRAL INFARCTION: ICD-10-CM

## 2021-07-27 DIAGNOSIS — I65.01 VERTEBRAL ARTERY STENOSIS, RIGHT: ICD-10-CM

## 2022-07-06 ENCOUNTER — HOSPITAL ENCOUNTER (OUTPATIENT)
Dept: RADIOLOGY | Facility: MEDICAL CENTER | Age: 62
End: 2022-07-06
Payer: MEDICARE

## 2022-07-06 NOTE — PROGRESS NOTES
"  Complaint: Something not right in his brain        Subjective:   Eb Poon is a 58 y.o. male here today To discuss the issues that he is having with his brain functioning.    Cognitive change  58-year-old patient comes in today complaining of a five to six-year history of thinking \"something is not quite right\" in the functioning of his brain. As a very complex history of cardiovascular and vascular disease. He is on disability for his cardiac problem. He was previously followed at [facility] by Dr. Josh Davidson. Has been seen by a local neurologist told him he might be suffering from antiphospholipid syndrome. He also sees Dr. Alvarado here in [place] for alternative medicine. The latter has prescribed testosterone supplementation off and on in the past. He's followed by our cardiac team as well.  Patient denies any depression or anxiety. He is able to concentrate without difficulty, denies difficulty in managing his affairs. Minor memory issues. No fatigue. He is pretty idle at home. He gets up late. Does not have much of a daily routine. He says the Valium takes off the edge and enables him to focus better. He says he's never been on a SSRI or similar drug in the past.    Low libido  Patient reports a long history of decreased libido. Has been on testosterone supplementation in past but not currently. No testosterone level available.         He also complains of a tight feeling on the left side of his neck where he underwent carotid endarterectomy.    He is a type II insulin requiring diabetic. He says his HbA1c run in the sixes.     Current medicines (including changes today)  Current Outpatient Prescriptions   Medication Sig Dispense Refill   • magnesium oxide (MAG-OX) 400 MG Tab Take 400 mg by mouth every day.     • B Complex Vitamins (VITAMIN-B COMPLEX PO) Take  by mouth.     • Coenzyme Q10 (COQ10) 100 MG Cap Take  by mouth.     • Omega-3 1000 MG Cap Take  by mouth.     • " TSP was consulted by Care Management for follow-up care navigation/PCP assignment.    TSP (Mary Brink) called the patient. Call was not received. TSP left a voice mail introducing role, purpose for the call and a call back number.      Charissa Martinez MPH (she/her)  Manager, Transition Support Program  [facility]  Please contact via [email or web address]   clopidogrel (PLAVIX) 75 MG TABS Take 1 Tab by mouth every day. 30 Tab 3   • diazepam (VALIUM) 2 MG TABS Take 1 Tab by mouth every 6 hours as needed for Anxiety. (Patient taking differently: Take 1 mg by mouth as needed for Anxiety.) 30 Tab 0   • multivitamin (THERAGRAN) TABS Take 1 Tab by mouth every day.     • aspirin (ASA) 81 MG CHEW chewable tablet Take 81 mg by mouth every day. 180 Tab 3   • insulin glargine (LANTUS) 100 UNIT/ML SOLN Inject 15 Units as instructed every day. 10 mL 3   • nitroglycerin (NITROSTAT) 0.4 MG SL Tab Place 1 Tab under tongue as needed for Chest Pain. 25 Tab 11     No current facility-administered medications for this visit.      He  has a past medical history of Arrhythmia; Arthritis; CAD (coronary artery disease); DIABETES MELLITUS; Dizziness; Fatigue; Gout; Heart disease; High cholesterol; Hypertension; Myocardial infarct (2007); Other and unspecified angina pectoris; PAD (peripheral artery disease); and Pain.     Health Maintenance:Postponed until records received      Allergies: Coreg [carvedilol]; Ranexa [ranolazine]; and Statins [hmg-coa-r inhibitors]    Current Outpatient Prescriptions [facility] in [facility]   Medication Sig Dispense Refill   • magnesium oxide (MAG-OX) 400 MG Tab Take 400 mg by mouth every day.     • B Complex Vitamins (VITAMIN-B COMPLEX PO) Take  by mouth.     • Coenzyme Q10 (COQ10) 100 MG Cap Take  by mouth.     • Omega-3 1000 MG Cap Take  by mouth.     • clopidogrel (PLAVIX) 75 MG TABS Take 1 Tab by mouth every day. 30 Tab 3   • diazepam (VALIUM) 2 MG TABS Take 1 Tab by mouth every 6 hours as needed for Anxiety. (Patient taking differently: Take 1 mg by mouth as needed for Anxiety.) 30 Tab 0   • multivitamin (THERAGRAN) TABS Take 1 Tab by mouth every day.     • aspirin (ASA) 81 MG CHEW chewable tablet Take 81 mg by mouth every day. 180 Tab 3   • insulin glargine (LANTUS) 100 UNIT/ML SOLN Inject 15 Units as instructed every day. 10 mL 3   • nitroglycerin (NITROSTAT)  "0.4 MG SL Tab Place 1 Tab under tongue as needed for Chest Pain. 25 Tab 11     No current [facility]-ordered facility-administered medications on file.        Past Medical History:   Diagnosis Date   • Arrhythmia    • Arthritis    • CAD (coronary artery disease)    • DIABETES MELLITUS    • Dizziness    • Fatigue    • Gout    • Heart disease    • High cholesterol    • Hypertension    • Myocardial infarct 2007   • Other and unspecified angina pectoris    • PAD (peripheral artery disease)    • Pain     back pain       Past Surgical History:   Procedure Laterality Date   • RECOVERY  6/25/2015    Procedure: VASCULAR CASE BYNUM-ABDOMINAL AORTOGRAM W/ RUNOFF, POSSIBLE INTERVENTION ICD 9: 433.10, 435.3, 443.9;  Surgeon: [facility];  Location: SURGERY PRE[facility]POST [facility];  Service:    • CAROTID ENDARTERECTOMY Left 5/20/2015    Procedure: CAROTID ENDARTERECTOMY ;  Surgeon: Topher Bynum M.D.;  Location: SURGERY [facility];  Service:    • OTHER CARDIAC SURGERY      stent placement       Social History   Substance Use Topics   • Smoking status: Current Every Day Smoker     Packs/day: 0.50     Years: 40.00     Types: Pipe   • Smokeless tobacco: Never Used   • Alcohol use No      Comment: quit 2.5 years ago       Social History     Social History Narrative   • No narrative on file       History reviewed. No pertinent family history.      ROS   Patient denies any stroke symptoms, headache, nasal congestion, sore-throat, cough, heartburn, chest pain, difficulty breathing, abdominal discomfort, diarrhea/constoipation, joint or back pain, skin rashes, depression or anxiety.       Objective:     Blood pressure 142/84, pulse 82, temperature 36.8 °C (98.2 °F), resp. rate 16, height 1.727 m (5' 8\"), weight 80.7 kg (177 lb 14.4 oz), SpO2 100 %. Body mass index is 27.05 kg/m².   Physical Exam:  Constitutional: Alert, no distress.Wears glasses.  Skin: Warm, dry, good turgor, no rashes in visible areas.  Eye: Equal, round and " reactive, conjunctiva clear, lids normal.  ENMT: Lips without lesions, good dentition, oropharynx clear.  Neck: Trachea midline, no masses, no thyromegaly. No cervical or supraclavicular lymphadenopathy. One over 2 bilateral carotid bruit noted. Thickening of tissue along the internal carotid noted upon palpation but no tenderness.  Respiratory: Unlabored respiratory effort, lungs clear to auscultation, no wheezes, no ronchi.  Cardiovascular: Normal S1, S2, no murmur, no extremity edema.  Psych: Alert and oriented x3, appropriate affect and mood.        Assessment and Plan:   The following treatment plan was discussed    1. Cognitive change  Chronic problem, unchanged. New to me. Requiring further evaluation. Might be related to his low testosterone level. He might be borderline hypothyroid as well. We'll check his thyroid function. If lab tests come back negative and review of all his past medical records is unremarkable, patient is agreeable to be tried on an SSRI.  - TSH WITH REFLEX TO FT4; Future    2. Low libido  Chronic problem, untreated. New to me. We'll check a testosterone level and if abnormally low, will supplement.  - TESTOSTERONE SERUM; Future      Discussed with patient that it is necessary for clarity with regard to the management of his different problems that he see Dr. Alvarado only for vitamin supplementations and alternative treatments, but leave to me the other issues. He is agreeable with this    We'll obtain his records from [facility].      Followup: Return in about 2 weeks (around 4/10/2018).

## 2022-07-07 ENCOUNTER — HOSPITAL ENCOUNTER (OUTPATIENT)
Dept: RADIOLOGY | Facility: MEDICAL CENTER | Age: 62
End: 2022-07-07
Attending: NURSE PRACTITIONER
Payer: MEDICARE

## 2022-07-07 DIAGNOSIS — I65.01 VERTEBRAL ARTERY STENOSIS, RIGHT: ICD-10-CM

## 2022-07-07 NOTE — PROGRESS NOTES
Telephone Appointment Visit    This telephone visit was initiated by the patient and they verbally consented. Dr. White personally attended the call.    Reason for Call:  Symptom Follow-up    HPI:    Eb Poon was initially referred to our service by Topher Bynum MD. He is a 62 y.o. male established patient who contacted us regarding complaints of drop attacks and dizziness. He is also under the care of Claudia Alcocer MD and Michelle Peters MD.     is well known to our practice. He has a history of vascular disease with interventions that include a left carotid endarterectomy and bilateral iliac stents. He was initially referred to our practice in 2019 for left vertebral artery occlusion and right vertebral artery stenosis and drop attacks that first developed in 2014. Workup was performed to rule out Bowhunter's Syndrome. During the angiogram workup, he was noted to have an incidental 50-60% stenosis of the right ICA. He was admitted to [facility] in April and underwent stenting of the circumflex artery. Clinical notes indicate he was a potential Plavix nonresponder and was placed on ticagrelor and later switched to prasugrel. CTA to rule out TIA showed a progression of the right ICA stenosis to 90% and he was referred back for evaluation of this finding. He underwent an uncomplicated Right carotid artery stent placement on June 26, 2020 and discharged to home on June 27. On June 30 he contacted our office with complaints of visual changes that include blurred vision and photophobia in both eyes. He thought both of his pupils were not reacting to light. We advised him to present to ED, and did go and get evaluated, but there were not acute findings noted on the workup. We obtained his imaging and Dr. White also contacted him for discussion. Additional clinical history includes ongoing tobacco dependence (smokes a pipe), ischemic cardiomyopathy, dyslipidemia, anxiety,  and diabetes mellitus. He has not been seen in our office since that time, but contacted us last week with complaints of drop attacks and dizziness. He was evaluated at the [facility] ED and their records were reviewed. There appears to be progression in the RIGHT vertebral artery stenosis but fortunately no acute stroke was noted on MRI.     Today, he reports no improvement in his symptoms. He has persistent dizziness and poor quality of life due to this complaint. He also has symptoms when he stands up quickly, and states he is unable to stand greater than 15 minutes at a time without sitting or reclining and flexing his head forward. The symptoms are greatly impacting his quality of life and cause him a great deal of anxiety. He is no longer taking prasugrel and has been switched to Xarelto.He continues to smoke. He is accompanied by Dr. White for review of symptoms and consideration of stent placement.       Labs / Images Reviewed:   Care Everywhere labs reviewed    MRI brain 6/10/22 at [facility]:  1.  No acute intracranial abnormality.   2.  Age-related atrophy and a small amount of microvascular white matter   gliosis.   3.  Punctate metallic foreign body within the upper eyelid on the left.     CTA head/neck 6/10/22 at [facility]:  Calcific atherosclerosis of the right petrous and cavernous ICAs, stable.     Unchanged chronic occlusion of the distal left vertebral artery.     Right common carotid through internal carotid artery stent is patent without   occlusion.     Prior left internal carotid endarterectomy without stenosis or occlusion.     Left subclavian artery stent is also patent without occlusion.     Stenoses reported are derived by comparing the narrowest segment with the more   distal luminal diameter.       Assessment and Plan:     1. Intracranial vertebral artery stenosis, right, symptomatic  - IR-CONSULT ONLY-OUTPATIENT; Standing  - IR-CONSULT  ONLY-OUTPATIENT    Plan:  Shoaib White MD has reviewed Mr. Poon's history and imaging studies, called the patient, and discussed treatment options. Mr. Poon has apparent progression of the right vertebral artery stenosis that is a potential cause of his dizziness. It is amenable to stent placement. Overall procedure risk is approximately 1-3%. He discussed the method of the procedure at length including the use of stents and balloons to facilitate the procedure and associated risks of those devices. He additionally discussed the procedure risks, including bleeding and infection, damage to the arteries, reaction to any medications given during the procedure, side effects of contrast, radiation exposure, in stent stenosis, stent migration, mechanical failure, stroke, hemorrhage, and death. There is a risk for unanticipated neurologic or non neurologic complications. There is a chance the stenosis may ultimately not be amenable to endovascular intervention. After the procedure, there is a chance that the symptoms could persist and that there is another cause of his dizziness, but vertebrobasilar insufficiency would be ruled out. We reviewed known risk factors for aneurysm recurrence that include uncontrolled hypertension and tobacco use, and modifiable risk factors were discussed. We explained that the patient will need to have ongoing surveillance imaging after the procedure, which will be managed by us, and that future treatment depends on multiple factors including lab studies, imaging, and performance status. We discussed alternatives to the procedure including surveillance and continued medical management. The patient verbalizes understanding of risks, benefits, and alternatives and elects to proceed. We discussed the need for aspirin and prasugrel prior to the procedure, however after discussion it was determined that he should remain on his DOAC and aspirin with no need to change medications. We  ask that he not hold his blood thinners for up to 6 months electively after a stent is placed. The patient will be contacted to schedule the procedure. We will request the assistance of anesthesia services to help facilitate the approximately 2 hour procedure.    Dada Poon is a 62 year old male scheduled for intracranial vertebral artery stent placement. This surgery will be higher risk surgery due to risk of intracranial hemorrhage or thrombotic events. Care provided will be in the Intensive Care Unit. Post procedurally, this patient will require every 1 hour nursing interventions and physician evaluations more than 3-4 times a day. The care needed cannot be provided in outpatient setting and without such care there is high risk of development of complications and death.       Follow-up: cardiac clearance for 2 hours of anesthesia monitoring. Remain on Xarelto and aspirin. Schedule right vertebral artery stent placement.     Total Call Time Spent (mins): 7     NELSON Odell

## 2022-07-07 NOTE — PROGRESS NOTES
"Pt contacted IR scheduling stating he had \"another attack\" with dizziness and would like to d/w Dr. White. Per notes, he was evaluated by neuro at [facility] and referred to ENT. Noted to have interval imaging at [facility] and have imported imaging. MD to call patient and discuss symptoms with possible correlation to previous workup for VB insufficiency. Order for phone encounter created.  "

## 2022-07-13 ENCOUNTER — APPOINTMENT (OUTPATIENT)
Dept: ADMISSIONS | Facility: MEDICAL CENTER | Age: 62
DRG: 038 | End: 2022-07-13
Attending: INTERNAL MEDICINE
Payer: MEDICARE

## 2022-07-13 RX ORDER — VALSARTAN 40 MG/1
40 TABLET ORAL EVERY MORNING
COMMUNITY

## 2022-07-13 RX ORDER — ISOSORBIDE MONONITRATE 30 MG/1
30 TABLET, EXTENDED RELEASE ORAL
COMMUNITY

## 2022-07-14 ENCOUNTER — ANESTHESIA (OUTPATIENT)
Dept: RADIOLOGY | Facility: MEDICAL CENTER | Age: 62
DRG: 038 | End: 2022-07-14
Payer: MEDICARE

## 2022-07-14 ENCOUNTER — APPOINTMENT (OUTPATIENT)
Dept: RADIOLOGY | Facility: MEDICAL CENTER | Age: 62
DRG: 038 | End: 2022-07-14
Attending: RADIOLOGY
Payer: MEDICARE

## 2022-07-14 ENCOUNTER — HOSPITAL ENCOUNTER (INPATIENT)
Facility: MEDICAL CENTER | Age: 62
LOS: 1 days | DRG: 038 | End: 2022-07-15
Attending: RADIOLOGY | Admitting: RADIOLOGY
Payer: MEDICARE

## 2022-07-14 ENCOUNTER — ANESTHESIA EVENT (OUTPATIENT)
Dept: RADIOLOGY | Facility: MEDICAL CENTER | Age: 62
DRG: 038 | End: 2022-07-14
Payer: MEDICARE

## 2022-07-14 DIAGNOSIS — I65.01 VERTEBRAL ARTERY STENOSIS, SYMPTOMATIC, WITHOUT INFARCTION, RIGHT: ICD-10-CM

## 2022-07-14 DIAGNOSIS — I65.01 STENOSIS OF RIGHT VERTEBRAL ARTERY: ICD-10-CM

## 2022-07-14 LAB
ANION GAP SERPL CALC-SCNC: 13 MMOL/L (ref 7–16)
BUN SERPL-MCNC: 19 MG/DL (ref 8–22)
CALCIUM SERPL-MCNC: 10.2 MG/DL (ref 8.5–10.5)
CHLORIDE SERPL-SCNC: 107 MMOL/L (ref 96–112)
CO2 SERPL-SCNC: 19 MMOL/L (ref 20–33)
CREAT SERPL-MCNC: 0.96 MG/DL (ref 0.5–1.4)
EKG IMPRESSION: NORMAL
ERYTHROCYTE [DISTWIDTH] IN BLOOD BY AUTOMATED COUNT: 40.2 FL (ref 35.9–50)
GFR SERPLBLD CREATININE-BSD FMLA CKD-EPI: 88 ML/MIN/1.73 M 2
GLUCOSE BLD STRIP.AUTO-MCNC: 188 MG/DL (ref 65–99)
GLUCOSE BLD STRIP.AUTO-MCNC: 92 MG/DL (ref 65–99)
GLUCOSE BLD STRIP.AUTO-MCNC: 99 MG/DL (ref 65–99)
GLUCOSE SERPL-MCNC: 111 MG/DL (ref 65–99)
HCT VFR BLD AUTO: 40.8 % (ref 42–52)
HGB BLD-MCNC: 13.9 G/DL (ref 14–18)
INR PPP: 1.15 (ref 0.87–1.13)
MCH RBC QN AUTO: 29.8 PG (ref 27–33)
MCHC RBC AUTO-ENTMCNC: 34.1 G/DL (ref 33.7–35.3)
MCV RBC AUTO: 87.6 FL (ref 81.4–97.8)
PLATELET # BLD AUTO: 284 K/UL (ref 164–446)
PMV BLD AUTO: 8.7 FL (ref 9–12.9)
POTASSIUM SERPL-SCNC: 4.9 MMOL/L (ref 3.6–5.5)
PROTHROMBIN TIME: 14.5 SEC (ref 12–14.6)
RBC # BLD AUTO: 4.66 M/UL (ref 4.7–6.1)
SODIUM SERPL-SCNC: 139 MMOL/L (ref 135–145)
WBC # BLD AUTO: 6.2 K/UL (ref 4.8–10.8)

## 2022-07-14 PROCEDURE — B410ZZZ FLUOROSCOPY OF ABDOMINAL AORTA: ICD-10-PCS | Performed by: RADIOLOGY

## 2022-07-14 PROCEDURE — 700102 HCHG RX REV CODE 250 W/ 637 OVERRIDE(OP): Performed by: RADIOLOGY

## 2022-07-14 PROCEDURE — 93005 ELECTROCARDIOGRAM TRACING: CPT | Performed by: RADIOLOGY

## 2022-07-14 PROCEDURE — 037P3DZ DILATION OF RIGHT VERTEBRAL ARTERY WITH INTRALUMINAL DEVICE, PERCUTANEOUS APPROACH: ICD-10-PCS | Performed by: RADIOLOGY

## 2022-07-14 PROCEDURE — 700111 HCHG RX REV CODE 636 W/ 250 OVERRIDE (IP): Performed by: ANESTHESIOLOGY

## 2022-07-14 PROCEDURE — 80048 BASIC METABOLIC PNL TOTAL CA: CPT

## 2022-07-14 PROCEDURE — 85027 COMPLETE CBC AUTOMATED: CPT

## 2022-07-14 PROCEDURE — 770022 HCHG ROOM/CARE - ICU (200)

## 2022-07-14 PROCEDURE — 700105 HCHG RX REV CODE 258: Performed by: RADIOLOGY

## 2022-07-14 PROCEDURE — 160035 HCHG PACU - 1ST 60 MINS PHASE I

## 2022-07-14 PROCEDURE — B3151ZZ FLUOROSCOPY OF BILATERAL COMMON CAROTID ARTERIES USING LOW OSMOLAR CONTRAST: ICD-10-PCS | Performed by: RADIOLOGY

## 2022-07-14 PROCEDURE — 82962 GLUCOSE BLOOD TEST: CPT

## 2022-07-14 PROCEDURE — 700117 HCHG RX CONTRAST REV CODE 255: Performed by: RADIOLOGY

## 2022-07-14 PROCEDURE — 36216 PLACE CATHETER IN ARTERY: CPT

## 2022-07-14 PROCEDURE — B3121ZZ FLUOROSCOPY OF LEFT SUBCLAVIAN ARTERY USING LOW OSMOLAR CONTRAST: ICD-10-PCS | Performed by: RADIOLOGY

## 2022-07-14 PROCEDURE — 700101 HCHG RX REV CODE 250: Performed by: ANESTHESIOLOGY

## 2022-07-14 PROCEDURE — 160036 HCHG PACU - EA ADDL 30 MINS PHASE I

## 2022-07-14 PROCEDURE — 160002 HCHG RECOVERY MINUTES (STAT)

## 2022-07-14 PROCEDURE — 99291 CRITICAL CARE FIRST HOUR: CPT | Performed by: INTERNAL MEDICINE

## 2022-07-14 PROCEDURE — 36620 INSERTION CATHETER ARTERY: CPT | Performed by: ANESTHESIOLOGY

## 2022-07-14 PROCEDURE — 93010 ELECTROCARDIOGRAM REPORT: CPT | Performed by: INTERNAL MEDICINE

## 2022-07-14 PROCEDURE — 01924 ANES THER IVNTL RAD ARTL NOS: CPT | Performed by: ANESTHESIOLOGY

## 2022-07-14 PROCEDURE — 85610 PROTHROMBIN TIME: CPT

## 2022-07-14 PROCEDURE — A9270 NON-COVERED ITEM OR SERVICE: HCPCS | Performed by: RADIOLOGY

## 2022-07-14 RX ORDER — DIAZEPAM 2 MG/1
2 TABLET ORAL EVERY 6 HOURS PRN
Status: DISCONTINUED | OUTPATIENT
Start: 2022-07-14 | End: 2022-07-15 | Stop reason: HOSPADM

## 2022-07-14 RX ORDER — OXYCODONE HCL 5 MG/5 ML
10 SOLUTION, ORAL ORAL
Status: DISCONTINUED | OUTPATIENT
Start: 2022-07-14 | End: 2022-07-14 | Stop reason: HOSPADM

## 2022-07-14 RX ORDER — HYDRALAZINE HYDROCHLORIDE 20 MG/ML
20 INJECTION INTRAMUSCULAR; INTRAVENOUS EVERY 6 HOURS PRN
Status: DISCONTINUED | OUTPATIENT
Start: 2022-07-14 | End: 2022-07-15 | Stop reason: HOSPADM

## 2022-07-14 RX ORDER — LABETALOL HYDROCHLORIDE 5 MG/ML
10-20 INJECTION, SOLUTION INTRAVENOUS EVERY 4 HOURS PRN
Status: DISCONTINUED | OUTPATIENT
Start: 2022-07-14 | End: 2022-07-15 | Stop reason: HOSPADM

## 2022-07-14 RX ORDER — OXYCODONE HCL 5 MG/5 ML
5 SOLUTION, ORAL ORAL
Status: DISCONTINUED | OUTPATIENT
Start: 2022-07-14 | End: 2022-07-14 | Stop reason: HOSPADM

## 2022-07-14 RX ORDER — SODIUM CHLORIDE, SODIUM LACTATE, POTASSIUM CHLORIDE, CALCIUM CHLORIDE 600; 310; 30; 20 MG/100ML; MG/100ML; MG/100ML; MG/100ML
INJECTION, SOLUTION INTRAVENOUS CONTINUOUS
Status: ACTIVE | OUTPATIENT
Start: 2022-07-14 | End: 2022-07-14

## 2022-07-14 RX ORDER — CEFAZOLIN SODIUM 1 G/3ML
INJECTION, POWDER, FOR SOLUTION INTRAMUSCULAR; INTRAVENOUS PRN
Status: DISCONTINUED | OUTPATIENT
Start: 2022-07-14 | End: 2022-07-14 | Stop reason: SURG

## 2022-07-14 RX ORDER — PROMETHAZINE HYDROCHLORIDE 25 MG/1
12.5-25 SUPPOSITORY RECTAL EVERY 4 HOURS PRN
Status: DISCONTINUED | OUTPATIENT
Start: 2022-07-14 | End: 2022-07-15 | Stop reason: HOSPADM

## 2022-07-14 RX ORDER — ONDANSETRON 2 MG/ML
INJECTION INTRAMUSCULAR; INTRAVENOUS PRN
Status: DISCONTINUED | OUTPATIENT
Start: 2022-07-14 | End: 2022-07-14 | Stop reason: SURG

## 2022-07-14 RX ORDER — PROCHLORPERAZINE EDISYLATE 5 MG/ML
5-10 INJECTION INTRAMUSCULAR; INTRAVENOUS EVERY 4 HOURS PRN
Status: DISCONTINUED | OUTPATIENT
Start: 2022-07-14 | End: 2022-07-15 | Stop reason: HOSPADM

## 2022-07-14 RX ORDER — ONDANSETRON 2 MG/ML
4 INJECTION INTRAMUSCULAR; INTRAVENOUS
Status: COMPLETED | OUTPATIENT
Start: 2022-07-14 | End: 2022-07-14

## 2022-07-14 RX ORDER — PROMETHAZINE HYDROCHLORIDE 25 MG/1
12.5-25 TABLET ORAL EVERY 4 HOURS PRN
Status: DISCONTINUED | OUTPATIENT
Start: 2022-07-14 | End: 2022-07-15 | Stop reason: HOSPADM

## 2022-07-14 RX ORDER — DIPHENHYDRAMINE HYDROCHLORIDE 50 MG/ML
12.5 INJECTION INTRAMUSCULAR; INTRAVENOUS
Status: DISCONTINUED | OUTPATIENT
Start: 2022-07-14 | End: 2022-07-14 | Stop reason: HOSPADM

## 2022-07-14 RX ORDER — HEPARIN SODIUM 1000 [USP'U]/ML
INJECTION, SOLUTION INTRAVENOUS; SUBCUTANEOUS
Status: DISPENSED
Start: 2022-07-14 | End: 2022-07-15

## 2022-07-14 RX ORDER — ISOSORBIDE MONONITRATE 30 MG/1
30 TABLET, EXTENDED RELEASE ORAL EVERY MORNING
Status: DISCONTINUED | OUTPATIENT
Start: 2022-07-15 | End: 2022-07-15 | Stop reason: HOSPADM

## 2022-07-14 RX ORDER — DEXTROSE MONOHYDRATE 25 G/50ML
25 INJECTION, SOLUTION INTRAVENOUS
Status: DISCONTINUED | OUTPATIENT
Start: 2022-07-14 | End: 2022-07-15 | Stop reason: HOSPADM

## 2022-07-14 RX ORDER — ONDANSETRON 4 MG/1
4 TABLET, ORALLY DISINTEGRATING ORAL EVERY 4 HOURS PRN
Status: DISCONTINUED | OUTPATIENT
Start: 2022-07-14 | End: 2022-07-15 | Stop reason: HOSPADM

## 2022-07-14 RX ORDER — SODIUM CHLORIDE, SODIUM LACTATE, POTASSIUM CHLORIDE, CALCIUM CHLORIDE 600; 310; 30; 20 MG/100ML; MG/100ML; MG/100ML; MG/100ML
INJECTION, SOLUTION INTRAVENOUS CONTINUOUS
Status: DISCONTINUED | OUTPATIENT
Start: 2022-07-14 | End: 2022-07-14 | Stop reason: HOSPADM

## 2022-07-14 RX ORDER — VALSARTAN 80 MG/1
40 TABLET ORAL DAILY
Status: DISCONTINUED | OUTPATIENT
Start: 2022-07-15 | End: 2022-07-15 | Stop reason: HOSPADM

## 2022-07-14 RX ORDER — HALOPERIDOL 5 MG/ML
1 INJECTION INTRAMUSCULAR
Status: DISCONTINUED | OUTPATIENT
Start: 2022-07-14 | End: 2022-07-14 | Stop reason: HOSPADM

## 2022-07-14 RX ORDER — DEXAMETHASONE SODIUM PHOSPHATE 4 MG/ML
INJECTION, SOLUTION INTRA-ARTICULAR; INTRALESIONAL; INTRAMUSCULAR; INTRAVENOUS; SOFT TISSUE PRN
Status: DISCONTINUED | OUTPATIENT
Start: 2022-07-14 | End: 2022-07-14 | Stop reason: SURG

## 2022-07-14 RX ORDER — ONDANSETRON 2 MG/ML
4 INJECTION INTRAMUSCULAR; INTRAVENOUS EVERY 4 HOURS PRN
Status: DISCONTINUED | OUTPATIENT
Start: 2022-07-14 | End: 2022-07-15 | Stop reason: HOSPADM

## 2022-07-14 RX ADMIN — FENTANYL CITRATE 100 MCG: 50 INJECTION, SOLUTION INTRAMUSCULAR; INTRAVENOUS at 13:55

## 2022-07-14 RX ADMIN — IOHEXOL 125 ML: 240 INJECTION, SOLUTION INTRATHECAL; INTRAVASCULAR; INTRAVENOUS; ORAL at 14:45

## 2022-07-14 RX ADMIN — ROCURONIUM BROMIDE 30 MG: 10 INJECTION, SOLUTION INTRAVENOUS at 13:55

## 2022-07-14 RX ADMIN — SUGAMMADEX 200 MG: 100 INJECTION, SOLUTION INTRAVENOUS at 14:35

## 2022-07-14 RX ADMIN — SODIUM CHLORIDE, POTASSIUM CHLORIDE, SODIUM LACTATE AND CALCIUM CHLORIDE: 600; 310; 30; 20 INJECTION, SOLUTION INTRAVENOUS at 11:28

## 2022-07-14 RX ADMIN — INSULIN HUMAN 2 UNITS: 100 INJECTION, SOLUTION PARENTERAL at 20:19

## 2022-07-14 RX ADMIN — ONDANSETRON 4 MG: 2 INJECTION INTRAMUSCULAR; INTRAVENOUS at 15:09

## 2022-07-14 RX ADMIN — RIVAROXABAN 10 MG: 10 TABLET, FILM COATED ORAL at 18:48

## 2022-07-14 RX ADMIN — DEXAMETHASONE SODIUM PHOSPHATE 8 MG: 4 INJECTION, SOLUTION INTRA-ARTICULAR; INTRALESIONAL; INTRAMUSCULAR; INTRAVENOUS; SOFT TISSUE at 14:27

## 2022-07-14 RX ADMIN — HALOPERIDOL LACTATE 1 MG: 5 INJECTION, SOLUTION INTRAMUSCULAR at 15:52

## 2022-07-14 RX ADMIN — ONDANSETRON 4 MG: 2 INJECTION INTRAMUSCULAR; INTRAVENOUS at 14:28

## 2022-07-14 RX ADMIN — PROPOFOL 150 MG: 10 INJECTION, EMULSION INTRAVENOUS at 13:54

## 2022-07-14 RX ADMIN — FENTANYL CITRATE 25 MCG: 50 INJECTION, SOLUTION INTRAMUSCULAR; INTRAVENOUS at 15:54

## 2022-07-14 RX ADMIN — CEFAZOLIN 2 G: 330 INJECTION, POWDER, FOR SOLUTION INTRAMUSCULAR; INTRAVENOUS at 13:53

## 2022-07-14 ASSESSMENT — PAIN DESCRIPTION - PAIN TYPE
TYPE: SURGICAL PAIN
TYPE: SURGICAL PAIN
TYPE: ACUTE PAIN
TYPE: ACUTE PAIN
TYPE: SURGICAL PAIN

## 2022-07-14 ASSESSMENT — FIBROSIS 4 INDEX: FIB4 SCORE: 0.93

## 2022-07-14 ASSESSMENT — PAIN SCALES - GENERAL: PAIN_LEVEL: 2

## 2022-07-14 NOTE — ANESTHESIA TIME REPORT
Anesthesia Start and Stop Event Times     Date Time Event    7/14/2022 1230 Ready for Procedure     1310 Anesthesia Start     1455 Anesthesia Stop        Responsible Staff  07/14/22    Name Role Begin End    Pete Limon M.D. Anesth 1310 145        Overtime Reason:  no overtime (within assigned shift)    Comments:

## 2022-07-14 NOTE — H&P
PULMONARY AND CRITICAL CARE MEDICINE HISTORY AND PHYSICAL EXAMINATION    Date of Admission:  7/14/2022    Admitting Physician:  Rosendo Lugo MD    Reason for Admission:  Right vertebral artery stenosis    Chief Complaint:  Right vertebral artery stenosis    History of Present Illness:    I was kindly asked to see and evaluate Eb Poon, a 62 y.o. male for evaluation and management of the above problem.    This document may serve as the history and physical examination for this admission as well as the critical care medicine consultation for Dr. White.    This gentleman has a history of cerebrovascular disease with prior left carotid endarterectomy and right carotid artery stent placement, peripheral artery disease with bilateral iliac stent placement and left subclavian stent placement, CAD with prior PCI with CHARLIE to the LAD and left circumflex, chronic systolic heart failure with an LVEF of 35 to 40%, DM type II, dyslipidemia and primary hypertension.  He has been suffering from symptomatic right vertebral artery stenosis.  Today Dr. White took him to the operating theater and performed right vertebral stent placement.  His procedure was uncomplicated.  He is now in the PACU.  He requires admission to the ICU for very close neurological observation and strict blood pressure control.    Medications Prior to Admission:    No current facility-administered medications on file prior to encounter.     Current Outpatient Medications on File Prior to Encounter   Medication Sig Dispense Refill   • valsartan (DIOVAN) 40 MG Tab Take 40 mg by mouth every day.     • rivaroxaban (XARELTO) 2.5 MG tablet Take 2.5 mg by mouth every evening.     • Ascorbic Acid (VITAMIN C PO) Take 2,000 mg by mouth every day.     • isosorbide mononitrate SR (IMDUR) 30 MG TABLET SR 24 HR Take 30 mg by mouth every morning.     • vitamin D3 (CHOLECALCIFEROL) 5000 Unit (125 mcg) Tab Take 5,000 Units by mouth every  day.     • B Complex Vitamins (VITAMIN-B COMPLEX PO) Take 1 Tablet by mouth every day.     • diazepam (VALIUM) 2 MG TABS Take 1 Tab by mouth every 6 hours as needed for Anxiety. (Patient taking differently: Take 1-2 mg by mouth 3 times a day as needed for Anxiety. Pt had 3 mg am of 7/14/22) 30 Tab 0   • aspirin 81 MG EC tablet Take 81 mg by mouth every day. 180 Tab 3   • insulin glargine (LANTUS) 100 UNIT/ML SOLN Inject 15 Units as instructed every day. (Patient taking differently: Inject 20 Units under the skin every evening.) 10 mL 3       Current Medications:      Current Facility-Administered Medications:   •  lidocaine (XYLOCAINE) 1 % injection 0.5 mL, 0.5 mL, Intradermal, Once PRN, Shoaib White M.D.  •  lactated ringers infusion, , Intravenous, Continuous, Pete Limon M.D., Last Rate: 50 mL/hr at 07/14/22 1500, Rate Verify at 07/14/22 1500  •  haloperidol lactate (HALDOL) injection 1 mg, 1 mg, Intravenous, Q15 MIN PRN, Pete Limon M.D., 1 mg at 07/14/22 1552  •  diphenhydrAMINE (BENADRYL) injection 12.5 mg, 12.5 mg, Intravenous, Q15 MIN PRN, Pete Limon M.D.  •  fentaNYL (SUBLIMAZE) injection 25 mcg, 25 mcg, Intravenous, Q2 MIN PRN, 25 mcg at 07/14/22 1554 **OR** fentaNYL (SUBLIMAZE) injection 50 mcg, 50 mcg, Intravenous, Q2 MIN PRN, Pete Limon M.D.  •  oxyCODONE (ROXICODONE) oral solution 5 mg, 5 mg, Oral, Once PRN **OR** oxyCODONE (ROXICODONE) oral solution 10 mg, 10 mg, Oral, Once PRN, Pete Limon M.D.  •  HEPARIN SODIUM (PORCINE) 1000 UNIT/ML INJ SOLN, , , ,   •  rivaroxaban (XARELTO) tablet 10 mg, 10 mg, Oral, DAILY AT 1800, Shoaib White M.D.  •  aspirin EC (ECOTRIN) tablet 81 mg, 81 mg, Oral, DAILY, Shoaib White M.D.  •  diazePAM (VALIUM) tablet 2 mg, 2 mg, Oral, Q6HRS PRN, Rosendo Lugo M.D.  •  insulin glargine (Lantus) injection, 20 Units, Subcutaneous, Nightly, Rosendo Lugo M.D.  •  isosorbide mononitrate SR (IMDUR) tablet 30 mg, 30 mg, Oral, QAM,  Rosendo Lugo M.D.  •  valsartan (DIOVAN) tablet 40 mg, 40 mg, Oral, DAILY, Rosendo Lugo M.D.  •  vitamin D3 (cholecalciferol) tablet 5,000 Units, 5,000 Units, Oral, DAILY, Rosendo Lugo M.D.  •  insulin regular (HumuLIN R,NovoLIN R) injection, 2-9 Units, Subcutaneous, 4X/DAY ACHS **AND** POC blood glucose manual result, , , Q AC AND BEDTIME(S) **AND** NOTIFY MD and PharmD, , , Once **AND** Administer 20 grams of glucose (approximately 8 ounces of fruit juice) every 15 minutes PRN FSBG less than 70 mg/dL, , , PRN **AND** dextrose 50% (D50W) injection 25 g, 25 g, Intravenous, Q15 MIN PRN, Rosendo Lugo M.D.  •  Respiratory Therapy Consult, , Nebulization, Continuous RT, Rosendo Lugo M.D.  •  ondansetron (ZOFRAN) syringe/vial injection 4 mg, 4 mg, Intravenous, Q4HRS PRN, Rosendo Lugo M.D.  •  ondansetron (ZOFRAN ODT) dispertab 4 mg, 4 mg, Oral, Q4HRS PRN, Rosendo Lugo M.D.  •  promethazine (PHENERGAN) tablet 12.5-25 mg, 12.5-25 mg, Oral, Q4HRS PRN, Rosendo Lugo M.D.  •  promethazine (PHENERGAN) suppository 12.5-25 mg, 12.5-25 mg, Rectal, Q4HRS PRN, Rosendo Lugo M.D.  •  prochlorperazine (COMPAZINE) injection 5-10 mg, 5-10 mg, Intravenous, Q4HRS PRN, Rosendo Lugo M.D.    Allergies:    Ranexa [ranolazine] and Statins [hmg-coa-r inhibitors]    Past Surgical History:    Past Surgical History:   Procedure Laterality Date   • CATARACT EXTRACTION WITH IOL Bilateral 2020   • RECOVERY  06/25/2015    Procedure: VASCULAR CASE BYNUM-ABDOMINAL AORTOGRAM W/ RUNOFF, POSSIBLE INTERVENTION ICD 9: 433.10, 435.3, 443.9;  Surgeon: Recovery[facility] Surgery;  [place] [facility] [place]:    • CAROTID ENDARTERECTOMY Left 05/20/2015    Procedure: CAROTID ENDARTERECTOMY ;  Surgeon: Topher Bynum M.D.;  Location: SURGERY [facility];  Service:    • OTHER CARDIAC SURGERY      left, stent placement   • STENT PLACEMENT Left   "   carotid artery   • STENT PLACEMENT Right     ICA (internal carotid artery)   • STENT PLACEMENT      iliac artery       Past Medical History:    Past Medical History:   Diagnosis Date   • Arrhythmia    • Arthritis    • Bowel habit changes 06/10/2020    Constipation   • CAD (coronary artery disease)    • Cataract 2020    IOL bilat   • Congestive heart failure (HCC)    • Deviated septum    • DIABETES MELLITUS 06/19/2020    Insulin   • Dizziness    • Fatigue    • Gout    • Heart disease    • Hemorrhagic disorder (HCC) 07/13/2022    on anticoagulant   • High cholesterol    • Hypertension    • Migraine aura without headache    • Myocardial infarct (HCC) 2007    MI in 2010 and 2020   • Other and unspecified angina pectoris    • PAD (peripheral artery disease)    • Pain     back pain   • Psychiatric problem     anxiety and depression   • Seizure ([facility])     unsure what kind \"drop attacks\"   • Stroke ([facility])     TIAs       Social History:    Social History     Socioeconomic History   • Marital status:      Spouse name: Not on file   • Number of children: Not on file   • Years of education: Not on file   • Highest education level: Not on file   Occupational History   • Not on file   Tobacco Use   • Smoking status: Current Every Day Smoker     Years: 40.00     Types: Cigarettes   • Smokeless tobacco: Former User   Vaping Use   • Vaping Use: Never used   Substance and Sexual Activity   • Alcohol use: No     Comment: quit 8 years ago   • Drug use: No   • Sexual activity: Not on file   Other Topics Concern   • Not on file   Social History Narrative   • Not on file     Social Determinants of Health     Financial Resource Strain: Not on file   Food Insecurity: Not on file   Transportation Needs: Not on file   Physical Activity: Not on file   Stress: Not on file   Social Connections: Not on file   Intimate Partner Violence: Not on file   Housing Stability: Not on file       Family History:    History reviewed. No pertinent " "family history.    Review of System:    Review of Systems   Unable to perform ROS: Acuity of condition       Physical Examination:    /66   Pulse 91   Temp 36.2 °C (97.1 °F) (Temporal)   Resp 16   Ht 1.727 m (5' 8\")   Wt 76.6 kg (168 lb 14 oz)   SpO2 96%   BMI 25.68 kg/m²   Physical Exam  Constitutional:       Appearance: He is not diaphoretic.   HENT:      Head: Normocephalic.      Nose: Nose normal.      Mouth/Throat:      Pharynx: Oropharynx is clear.   Eyes:      Conjunctiva/sclera: Conjunctivae normal.      Pupils: Pupils are equal, round, and reactive to light.   Cardiovascular:      Pulses: Normal pulses.      Comments: Sinus rhythm  Pulmonary:      Breath sounds: No wheezing or rales.   Abdominal:      General: There is no distension.      Tenderness: There is no abdominal tenderness. There is no guarding.   Musculoskeletal:         General: Normal range of motion.      Cervical back: Normal range of motion.      Right lower leg: No edema.      Left lower leg: No edema.   Skin:     General: Skin is warm.      Capillary Refill: Capillary refill takes less than 2 seconds.   Neurological:      General: No focal deficit present.      Mental Status: He is oriented to person, place, and time.      Cranial Nerves: No cranial nerve deficit.         Laboratory Data:        Recent Labs     07/14/22  1120   WBC 6.2   RBC 4.66*   HEMOGLOBIN 13.9*   HEMATOCRIT 40.8*   MCV 87.6   MCH 29.8   MCHC 34.1   RDW 40.2   PLATELETCT 284   MPV 8.7*     Recent Labs     07/14/22  1120   SODIUM 139   POTASSIUM 4.9   CHLORIDE 107   CO2 19*   GLUCOSE 111*   BUN 19   CREATININE 0.96   CALCIUM 10.2                   Imaging:    I personally viewed all the available CXR and CT scan images as well as reviewed the radiology interpretation reports.    IR-VERTEBRAL ART ANGIO    (Results Pending)       Assessment and Plan:    * Stenosis of right vertebral artery  Assessment & Plan  S/P right vertebral artery stent placement on 7/14 " by Dr. Seo  Aspirin, 81 mg a day  Rivaroxaban, 10 mg daily  Strict blood pressure control with goal SBP less than 160  Neuro checks every hour    Peripheral artery disease (CMS-HCC)- (present on admission)  Assessment & Plan  Prior left subclavian stent and bilateral iliac stents  Aspirin, 81 mg daily  Rivaroxaban, 10 mg daily    CAD (coronary artery disease)- (present on admission)  Assessment & Plan  Prior PCI with CHARLIE to the LAD and left circumflex  Aspirin 81 mg a day  Resume isosorbide mononitrate, 30 mg daily    Type 2 diabetes mellitus (HCC)- (present on admission)  Assessment & Plan  Glycohemoglobin 6.5 approximately 1 month ago  Sliding scale insulin    Dyslipidemia- (present on admission)  Assessment & Plan  History of intolerance to statins      I have assessed and reassessed his neurologic status as well has his blood pressure, hemodynamics and cardiovascular status.  This gentleman has just undergone right vertebral artery stent placement for symptomatic right vertebral artery stenosis.  He requires very close neurological observation and strict blood pressure control.    High risk of deterioration and worsening vital organ dysfunction and death without the above critical care interventions.    The patient is critically ill.  Critical care time = 35 minutes in directly providing and coordinating critical care and extensive data review.  No time overlap and excludes procedures.    Discussed with Dr. Seo, RN    Rosendo Lugo MD  Pulmonary and Critical Care Medicine

## 2022-07-14 NOTE — ASSESSMENT & PLAN NOTE
Prior PCI with CHARLIE to the LAD and left circumflex  Aspirin 81 mg a day  Resume isosorbide mononitrate, 30 mg daily

## 2022-07-14 NOTE — ASSESSMENT & PLAN NOTE
Prior left subclavian stent and bilateral iliac stents  Aspirin, 81 mg daily  Rivaroxaban, 10 mg daily

## 2022-07-14 NOTE — PROGRESS NOTES
Patient brought to IR for Cerebral Angiogram with possible Right Vertebral Artery Stent. Patient AAOx4, respirations even and unlabored. Dr. White at bedside with consent. Patient assisted to bed and attached to NiBP, O2, SpO2, Co2, and EKG. Dr. Limon at bedside with anesthesia. Anesthesia consent signed. Anesthesia will provide monitoring and medications for patient.    No neuro deficits noted. Patient moves all extremities independently. No numbness or tingling to arms and legs. No baseline vision disturbances. Denies headaches at present. States auras often. Voice clear and judgement appropriate.     1357 Time out done for procedure to begin    1400 6 fr sheath to Right Femoral    1418 Sent #1 deployed    1435 Angio-seal deployed to right groin    1435 Procedure End    1443 Hemostasis    1445 Called report RN to PACU. Patient transport with RN at bedside.     Stent #1  [employer]--Synergy--[place]--Everolimus-Eluting Platinum Chromium Coronary Stent Systemt  1.0 mm x 16 mm  LOT: [id number]  REF: [id number]  EXP: 09.24.2022    AngioSeal--[employer]--Vascular Closure Device  6 fr   LOT: [id number]  REF: [id number]  EXP: 04.590671

## 2022-07-14 NOTE — ANESTHESIA PROCEDURE NOTES
Airway    Date/Time: 7/14/2022 1:45 PM  Performed by: Pete Limon M.D.  Authorized by: Pete Limon M.D.     Location:  OR  Urgency:  Elective  Difficult Airway: No    Indications for Airway Management:  Anesthesia      Spontaneous Ventilation: present    Sedation Level:  Deep  Preoxygenated: Yes    Patient Position:  Sniffing  MILS Maintained Throughout: No    Mask Difficulty Assessment:  0 - not attempted  Final Airway Type:  Supraglottic airway  Final Supraglottic Airway:  Standard LMA    SGA Size:  5

## 2022-07-14 NOTE — OR SURGEON
Immediate Post- Operative Note        Findings:Right vertebral stenosis     Procedure(s): Right vertebral stent placement       Estimated Blood Loss: Less than 5 ml        Complications: None            7/14/2022     2:41 PM     Shoaib White M.D.

## 2022-07-14 NOTE — ASSESSMENT & PLAN NOTE
S/P right vertebral artery stent placement on 7/14 by Dr. Seo  Aspirin, 81 mg a day  Rivaroxaban, 10 mg daily  Strict blood pressure control with goal SBP less than 160  Neuro checks every hour

## 2022-07-14 NOTE — OR NURSING
"1453 Patient arrived to PACU from IR post vertebral stent placement.  Report from anesthesia and RN.  Patient arouses to voice, denies discomfort.  Right groin is clean, dry and soft.  Patient educated on groin precautions.  1505 Wife called and updated.  1515 Patient states he is a little dizzy.  Right groin is clean, dry and soft.  1525 Dr. White at bedside.  1535 Dr. Lugo at bedside.  Patient c/o blurry vision and \"auras,\" MD aware.  1554 Patient medicated per MAR for nausea and discomfort in groin.  Groin site remains clean, dry and soft.  1613 Wife called and updated.  1615 Patient c/o dizziness, soreness in groin.  Groin is clean, dry and soft.  1445 Right groin is clean, dry and soft.  Head of bed elevated.  1655 Report called to Valencia GRAY.  1710 Patient transferred to [facility] via [facility] with RN and CNA.  Handoff report to Valencia GRAY. Patient belongings at bedside.  Right groin is clean, dry and soft.  Wife updated.  "

## 2022-07-14 NOTE — ANESTHESIA PROCEDURE NOTES
Arterial Line  Performed by: Pete Limon M.D.  Authorized by: Pete Limon M.D.     Start Time:  7/14/2022 1:45 PM  End Time:  7/14/2022 1:48 PM  Localization: ultrasound guidance  Image captured, interpreted and electronically stored.  Patient Location:  OR  Indication: continuous blood pressure monitoring        Catheter Size:  20 G  Seldinger Technique?: Yes    Laterality:  Right  Site:  Brachial artery  Line Secured:  Transparent dressing  Events: patient tolerated procedure well with no complications

## 2022-07-14 NOTE — ANESTHESIA PREPROCEDURE EVALUATION
Date/Time: 07/14/22 1200    Scheduled providers: Shoaib White M.D.; Pete Limon M.D.    Procedure: IR-VERTEBRAL ART ANGIO    Diagnosis: Vertebral artery stenosis, symptomatic, without infarction, right [I65.01]    Indications:       Other - Please Comment      symptomatic right vertebral artery stenosis, stent placement, OK to remain on Xarelto and aspirin    Location: [facility] IMAGING - INTERVENTIONAL - [facility]          Relevant Problems   CARDIAC   (positive) Carotid stenosis, right   (positive) Coronary artery disease of native artery of native heart with stable angina pectoris (HCC)   (positive) Coronary artery disease, occlusive   (positive) Left carotid artery occlusion   (positive) NSTEMI (non-ST elevated myocardial infarction) ([facility])   (positive) Occlusion and stenosis of carotid artery without mention of cerebral infarction   (positive) Peripheral artery disease (CMS-HCC)       Physical Exam    Airway   Mallampati: II  TM distance: >3 FB  Neck ROM: full       Cardiovascular   Rhythm: regular  Rate: normal     Dental - normal exam           Pulmonary   Breath sounds clear to auscultation     Abdominal    Neurological - normal exam                 Anesthesia Plan    ASA 3   ASA physical status 3 criteria: CAD/stents (> 3 months)    Plan - general       Airway plan will be ETT        Plan Factors:   Patient was not previously instructed to abstain from smoking on day of procedure.  Patient did not smoke on day of procedure.      Induction: intravenous          Informed Consent:    Anesthetic plan and risks discussed with patient.

## 2022-07-14 NOTE — ANESTHESIA POSTPROCEDURE EVALUATION
Patient: Eb Poon    Procedure Summary     Date: 07/14/22 Room / Location: [facility] - [facility]AL - [facility]    Anesthesia Start: 1310 Anesthesia Stop: 1455    Procedure: IR-VERTEBRAL ART ANGIO Diagnosis:       Vertebral artery stenosis, symptomatic, without infarction, right      (Other - Please Comment)      (symptomatic right vertebral artery stenosis, stent placement, OK to remain on Xarelto and aspirin)    Scheduled Providers: Shoaib White M.D.; Pete Limon M.D. Responsible Provider: Pete Limon M.D.    Anesthesia Type: general ASA Status: 3          Final Anesthesia Type: general  Last vitals  BP   Blood Pressure: (!) 145/68    Temp   36.6 °C (97.8 °F)    Pulse   87   Resp   16    SpO2   100 %      Anesthesia Post Evaluation    Patient location during evaluation: PACU  Patient participation: complete - patient participated  Level of consciousness: awake and alert  Pain score: 2    Airway patency: patent  Anesthetic complications: no  Cardiovascular status: adequate  Respiratory status: acceptable  Hydration status: acceptable    PONV: none          No complications documented.     Nurse Pain Score: 0 (NPRS)

## 2022-07-15 VITALS
BODY MASS INDEX: 25.76 KG/M2 | HEART RATE: 91 BPM | WEIGHT: 169.97 LBS | OXYGEN SATURATION: 97 % | HEIGHT: 68 IN | SYSTOLIC BLOOD PRESSURE: 132 MMHG | RESPIRATION RATE: 56 BRPM | TEMPERATURE: 98 F | DIASTOLIC BLOOD PRESSURE: 58 MMHG

## 2022-07-15 DIAGNOSIS — I65.01 VERTEBRAL ARTERY STENOSIS, RIGHT: ICD-10-CM

## 2022-07-15 PROBLEM — F17.200 TOBACCO DEPENDENCE: Status: ACTIVE | Noted: 2022-07-15

## 2022-07-15 LAB
GLUCOSE BLD STRIP.AUTO-MCNC: 198 MG/DL (ref 65–99)
GLUCOSE BLD STRIP.AUTO-MCNC: 202 MG/DL (ref 65–99)

## 2022-07-15 PROCEDURE — 700102 HCHG RX REV CODE 250 W/ 637 OVERRIDE(OP): Performed by: INTERNAL MEDICINE

## 2022-07-15 PROCEDURE — 82962 GLUCOSE BLOOD TEST: CPT | Mod: 91

## 2022-07-15 PROCEDURE — A9270 NON-COVERED ITEM OR SERVICE: HCPCS | Performed by: INTERNAL MEDICINE

## 2022-07-15 PROCEDURE — A9270 NON-COVERED ITEM OR SERVICE: HCPCS | Performed by: RADIOLOGY

## 2022-07-15 PROCEDURE — 700102 HCHG RX REV CODE 250 W/ 637 OVERRIDE(OP): Performed by: RADIOLOGY

## 2022-07-15 PROCEDURE — 99239 HOSP IP/OBS DSCHRG MGMT >30: CPT | Performed by: NURSE PRACTITIONER

## 2022-07-15 RX ADMIN — ISOSORBIDE MONONITRATE 30 MG: 30 TABLET, EXTENDED RELEASE ORAL at 05:26

## 2022-07-15 RX ADMIN — ASPIRIN 81 MG: 81 TABLET, COATED ORAL at 05:27

## 2022-07-15 RX ADMIN — CHOLECALCIFEROL TAB 125 MCG (5000 UNIT) 5000 UNITS: 125 TAB at 05:26

## 2022-07-15 RX ADMIN — INSULIN HUMAN 2 UNITS: 100 INJECTION, SOLUTION PARENTERAL at 08:52

## 2022-07-15 RX ADMIN — VALSARTAN 40 MG: 80 TABLET, FILM COATED ORAL at 05:27

## 2022-07-15 RX ADMIN — DIAZEPAM 2 MG: 2 TABLET ORAL at 07:34

## 2022-07-15 ASSESSMENT — PAIN DESCRIPTION - PAIN TYPE: TYPE: ACUTE PAIN

## 2022-07-15 ASSESSMENT — COPD QUESTIONNAIRES: HAVE YOU SMOKED AT LEAST 100 CIGARETTES IN YOUR ENTIRE LIFE: NO/DON'T KNOW

## 2022-07-15 NOTE — PROGRESS NOTES
Patient arrived to unit with PACU RN. Bedside neuro assessment performed. Right pedal pulse and left posttibial heard by doppler, baseline per report. Right groin site clean, dry, intact.

## 2022-07-15 NOTE — DISCHARGE INSTRUCTIONS
-STOP smoking  -Take your medications as prescribed  -FU with Dr. White - someone from his office will call you with appointment  -Return to the nearest emergency department or call 911 for any uncontrolled chest pain, shortness of breath, nausea/vomiting, or stroked like symptoms (e.g. increased weakness on one side, difficulties speaking/swallowing, facial droop)Discharge Instructions    Discharged to home by car with relative. Discharged via walking, hospital escort: Yes.  Special equipment needed: Not Applicable    Be sure to schedule a follow-up appointment with your primary care doctor or any specialists as instructed.     Discharge Plan:   Diet Plan: Discussed  Activity Level: Discussed  Confirmed Follow up Appointment: Patient to Call and Schedule Appointment  Confirmed Symptoms Management: Discussed  Medication Reconciliation Updated: Yes    I understand that a diet low in cholesterol, fat, and sodium is recommended for good health. Unless I have been given specific instructions below for another diet, I accept this instruction as my diet prescription.   Other diet: Resume a heart healthy, diabetic, consistent carbohydrate diet    Special Instructions:     -Is this patient being discharged with medication to prevent blood clots?  Yes, Xarelto   Rivaroxaban oral tablets  What is this medicine?  RIVAROXABAN (ri va DIXON a ban) is an anticoagulant (blood thinner). It is used to treat blood clots in the lungs or in the veins. It is also used to prevent blood clots in the lungs or in the veins. It is also used to lower the chance of stroke in people with a medical condition called atrial fibrillation.  This medicine may be used for other purposes; ask your health care provider or pharmacist if you have questions.  COMMON BRAND NAME(S): Xarelto, Xarelto Starter Pack  What should I tell my health care provider before I take this medicine?  They need to know if you have any of these conditions:  antiphospholipid  antibody syndrome  artificial heart valve  bleeding disorders  bleeding in the brain  blood in your stools (black or tarry stools) or if you have blood in your vomit  history of blood clots  history of stomach bleeding  kidney disease  liver disease  low blood counts, like low white cell, platelet, or red cell counts  recent or planned spinal or epidural procedure  take medicines that treat or prevent blood clots  an unusual or allergic reaction to rivaroxaban, other medicines, foods, dyes, or preservatives  pregnant or trying to get pregnant  breast-feeding  How should I use this medicine?  Take this medicine by mouth with a glass of water. Follow the directions on the prescription label. Take your medicine at regular intervals. Do not take it more often than directed. Do not stop taking except on your doctor's advice. Stopping this medicine may increase your risk of a blood clot. Be sure to refill your prescription before you run out of medicine.  If you are taking this medicine after hip or knee replacement surgery, take it with or without food. If you are taking this medicine for atrial fibrillation, take it with your evening meal. If you are taking this medicine to treat blood clots, take it with food at the same time each day. If you are unable to swallow your tablet, you may crush the tablet and mix it in applesauce. Then, immediately eat the applesauce. You should eat more food right after you eat the applesauce containing the crushed tablet.  Talk to your pediatrician regarding the use of this medicine in children. Special care may be needed.  Overdosage: If you think you have taken too much of this medicine contact a poison control center or emergency room at once.  NOTE: This medicine is only for you. Do not share this medicine with others.  What if I miss a dose?  If you take your medicine once a day and miss a dose, take the missed dose as soon as you remember. If it is almost time for your next dose,  take only that dose. Do not take double or extra doses.  If you take your medicine twice a day and miss a dose, take the missed dose immediately. In this instance, 2 tablets may be taken at the same time. The next day you should take 1 tablet twice a day as directed.  What may interact with this medicine?  Do not take this medicine with any of the following medications:  defibrotide  This medicine may also interact with the following medications:  aspirin and aspirin-like medicines  certain antibiotics like erythromycin, azithromycin, and clarithromycin  certain medicines for fungal infections like ketoconazole and itraconazole  certain medicines for irregular heart beat like amiodarone, quinidine, dronedarone  certain medicines for seizures like carbamazepine, phenytoin  certain medicines that treat or prevent blood clots like warfarin, enoxaparin, and dalteparin  conivaptan  felodipine  indinavir  lopinavir; ritonavir  NSAIDS, medicines for pain and inflammation, like ibuprofen or naproxen  ranolazine  rifampin  ritonavir  SNRIs, medicines for depression, like desvenlafaxine, duloxetine, levomilnacipran, venlafaxine  SSRIs, medicines for depression, like citalopram, escitalopram, fluoxetine, fluvoxamine, paroxetine, sertraline  [place]'s wort  verapamil  This list may not describe all possible interactions. Give your health care provider a list of all the medicines, herbs, non-prescription drugs, or dietary supplements you use. Also tell them if you smoke, drink alcohol, or use illegal drugs. Some items may interact with your medicine.  What should I watch for while using this medicine?  Visit your healthcare professional for regular checks on your progress. You may need blood work done while you are taking this medicine. Your condition will be monitored carefully while you are receiving this medicine. It is important not to miss any appointments.  Avoid sports and activities that might cause injury while you are  using this medicine. Severe falls or injuries can cause unseen bleeding. Be careful when using sharp tools or knives. Consider using an electric razor. Take special care brushing or flossing your teeth. Report any injuries, bruising, or red spots on the skin to your healthcare professional.  If you are going to need surgery or other procedure, tell your healthcare professional that you are taking this medicine.  Wear a medical ID bracelet or chain. Carry a card that describes your disease and details of your medicine and dosage times.  What side effects may I notice from receiving this medicine?  Side effects that you should report to your doctor or health care professional as soon as possible:  allergic reactions like skin rash, itching or hives, swelling of the face, lips, or tongue  back pain  redness, blistering, peeling or loosening of the skin, including inside the mouth  signs and symptoms of bleeding such as bloody or black, tarry stools; red or dark-brown urine; spitting up blood or brown material that looks like coffee grounds; red spots on the skin; unusual bruising or bleeding from the eye, gums, or nose  signs and symptoms of a blood clot such as chest pain; shortness of breath; pain, swelling, or warmth in the leg  signs and symptoms of a stroke such as changes in vision; confusion; trouble speaking or understanding; severe headaches; sudden numbness or weakness of the face, arm or leg; trouble walking; dizziness; loss of coordination  Side effects that usually do not require medical attention (report to your doctor or health care professional if they continue or are bothersome):  dizziness  muscle pain  This list may not describe all possible side effects. Call your doctor for medical advice about side effects. You may report side effects to FDA at 8-691-FDA-1896.  Where should I keep my medicine?  Keep out of the reach of children.  Store at room temperature between 15 and 30 degrees C (59 and 86  degrees F). Throw away any unused medicine after the expiration date.  NOTE: This sheet is a summary. It may not cover all possible information. If you have questions about this medicine, talk to your doctor, pharmacist, or health care provider.  © 2020 [employer] (2020-03-16 09:45:59)    Is patient discharged on Warfarin / Coumadin?   No

## 2022-07-15 NOTE — PROGRESS NOTES
4 Eyes Skin Assessment Completed by ISAAC [place] and ISAAC Wynne.    Head Blanching and Redness  Ears WDL  Nose WDL  Mouth WDL  Neck WDL  Breast/Chest WDL  Shoulder Blades Redness and Blanching  Spine WDL  (R) Arm/Elbow/Hand Bruising and Abrasion  (L) Arm/Elbow/Hand WDL  Abdomen WDL  Groin WDL Right groin site clean, dry, intact  Scrotum/Coccyx/Buttocks Redness and Blanching  (R) Leg WDL  (L) Leg WDL  (R) Heel/Foot/Toe WDL  (L) Heel/Foot/Toe WDL          Devices In Places ECG, Blood Pressure Cuff, Pulse Ox and SCD's      Interventions In Place Pillows, Q2 Turns and Low Air Loss Mattress    Possible Skin Injury No    Pictures Uploaded Into Epic N/A  Wound Consult Placed N/A  RN Wound Prevention Protocol Ordered No

## 2022-07-15 NOTE — DISCHARGE SUMMARY
"Discharge Summary    CHIEF COMPLAINT ON ADMISSION  No chief complaint on file.      Reason for Admission  Occlusion and stenosis of right vertebral artery    Admission Date  7/14/2022    Discharge Date  07/15/22    CODE STATUS  Full Code    HPI & HOSPITAL COURSE  Dada Poon is a 62-year-old male with PMH significant for DM 2, chronic benzodiazepine use, ongoing tobacco dependence, CAD with prior PCI/CHARLIE to LAD and left circumflex, DLD, PAD, and chronic systolic heart failure with EF 35 to 40% who was admitted 7/14/2022 for elective right vertebral stent placement for right vertebral artery stenosis by Dr. White.  His procedure was uncomplicated and he was admitted to the ICU overnight for serial neurologic exams.    No acute events overnight. Patient A/O x  4 with no focal deficits. Ambulated around the unit with RN. Tolerating diet. Discussed IR recommendations to take Xarelto at higher dose x 30 days. Sent to patient's pharmacy of choice. He is asking for free sample which we are unable to provide for him. Discussed/educated on importance of tobacco cessation. Patient states he \"will try\".     Therefore, he is discharged in good and stable condition to home with close outpatient follow-up.    The patient met 2-midnight criteria for an inpatient stay at the time of discharge.      FOLLOW UP ITEMS POST DISCHARGE  -STOP smoking  -Take your medications as prescribed  -FU with Dr. White - someone from his office will call you with appointment  -Return to the nearest emergency department or call 911 for any uncontrolled chest pain, shortness of breath, nausea/vomiting, or stroked like symptoms (e.g. increased weakness on one side, difficulties speaking/swallowing, facial droop)    DISCHARGE DIAGNOSES  Principal Problem (Resolved):    Stenosis of right vertebral artery POA: Unknown  Active Problems:    Type 2 diabetes mellitus (HCC) POA: Yes    CAD (coronary artery disease) POA: Yes      Overview: 2007 LAD " 95%, followed by 70% stenoses. Stented both areas. Circumflex       80%, followed by 80%, both areas stented.    Dyslipidemia POA: Yes    Peripheral artery disease (CMS-[facility]) POA: Yes      Overview: 2015: Occlusion of both common iliacs. ABIs 0.5-26 on right, and 0.9 on       the left. Claudication primarily on the right    Tobacco dependence POA: Unknown      FOLLOW UP  Keenan Calderon D.O.  [address] [facility] [address]  [phone]    Call        MEDICATIONS ON DISCHARGE     Medication List      CHANGE how you take these medications      Instructions   diazePAM 2 MG Tabs  What changed:   · how much to take  · when to take this  · additional instructions  Commonly known as: VALIUM   Take 1 Tab by mouth every 6 hours as needed for Anxiety.  Dose: 2 mg     insulin glargine 100 UNIT/ML Soln  What changed:   · how much to take  · when to take this  Commonly known as: Lantus   Inject 15 Units as instructed every day.  Dose: 15 Units     * rivaroxaban 2.5 MG tablet  What changed: Another medication with the same name was added. Make sure you understand how and when to take each.  Commonly known as: Xarelto  RESUME THIS DOSE in 30 DAYS   Take 2.5 mg by mouth every evening.  Dose: 2.5 mg     * rivaroxaban 10 MG Tabs tablet  What changed: You were already taking a medication with the same name, and this prescription was added. Make sure you understand how and when to take each.  Commonly known as: XARELTO   Take 1 Tablet by mouth every day at 6 PM x 30 days  Dose: 10 mg         * This list has 2 medication(s) that are the same as other medications prescribed for you. Read the directions carefully, and ask your doctor or other care provider to review them with you.            CONTINUE taking these medications      Instructions   aspirin 81 MG EC tablet   Take 81 mg by mouth every day.  Dose: 81 mg     isosorbide mononitrate SR 30 MG Tb24  Commonly known as: IMDUR   Take 30 mg by mouth every morning.  Dose: 30  mg     valsartan 40 MG Tabs  Commonly known as: DIOVAN   Take 40 mg by mouth every day.  Dose: 40 mg     VITAMIN C PO   Take 2,000 mg by mouth every day.  Dose: 2,000 mg     vitamin D3 5000 Unit (125 mcg) Tabs  Commonly known as: cholecalciferol   Take 5,000 Units by mouth every day.  Dose: 5,000 Units     VITAMIN-B COMPLEX PO   Take 1 Tablet by mouth every day.  Dose: 1 Tablet            Allergies  Allergies   Allergen Reactions   • Ranexa [Ranolazine]      Dropped heart rate low and worsened lightheadedness.    • Statins [Hmg-Coa-R Inhibitors]      Muscle aches.       DIET  Orders Placed This Encounter   Procedures   • Diet Order Diet: Consistent CHO (Diabetic)     Standing Status:   Standing     Number of Occurrences:   1     Order Specific Question:   Diet:     Answer:   Consistent CHO (Diabetic) [4]       ACTIVITY  As tolerated.  Weight bearing as tolerated    CONSULTATIONS  Critical Care  Interventional Radiology    PROCEDURES  Stent right vertebral artery    LABORATORY  Lab Results   Component Value Date    SODIUM 139 07/14/2022    POTASSIUM 4.9 07/14/2022    CHLORIDE 107 07/14/2022    CO2 19 (L) 07/14/2022    GLUCOSE 111 (H) 07/14/2022    BUN 19 07/14/2022    CREATININE 0.96 07/14/2022    GLOMRATE 65 06/28/2022        Lab Results   Component Value Date    WBC 6.2 07/14/2022    HEMOGLOBIN 13.9 (L) 07/14/2022    HEMATOCRIT 40.8 (L) 07/14/2022    PLATELETCT 284 07/14/2022        Total time of the discharge process exceeds 41 minutes.    Please note that this dictation was created using voice recognition software. I have made every reasonable attempt to correct obvious errors, but there may be errors of grammar and possibly content that I did not discover before finalizing the note.    SHERRILL Medina.

## 2022-07-15 NOTE — PROGRESS NOTES
S/p uncomplicated endovascular neurointervention on 7/14/22 by Shoaib White MD (ANDREW, x [phone]): stent placement for symptomatic right V3 arterial stenosis. He is known to our practice for prior neurovascular stent evaluations and procedures. Admitted to ICU for post procedure neurologic monitoring and supportive care.     Today, the patient complains of right femoral angio site soreness, which is typical for him for post- angiography access on the right side. Denies headache, dizziness, lightheadedness, or feelings of being off balance while in bed. Has not been up yet. C/o generalized weakness from chronic illness but has not noticed any new symptoms since his procedure.     Awake, oriented x 4. Face symmetric, speech fluent. RIGHT angio site WNL, without ecchymosis or swelling. Distal pulses 2+, skin warm, cap refill <2 sec.     From a NeuroInterventional Service standpoint, OK to discharge to home when medically cleared by Hospitalist Service. Patient to follow up virtually or in our clinic in 2-6 weeks, our office to arrange appointment. Will send RX to pt's pharmacy for rivaroxaban 10 mg daily x 30 days for recent stent placement, then to resume prior 2.5 mg dose. Continue aspirin at same dose. This was discussed with the patient. Usual surveillance plan is [facility] w/[facility] head and neck in 1 year to eval his stents. Pt states he wishes to have the surveillance in 6 months instead, therefore will arrange scan at an in-network facility.    For femoral arterial access:  Post-angioseal instructions: no lifting greater than 5 lbs and no baths/ swimming/ soaking in tub for 5 days. Then resume normal activity. Shower OK. OK to change dressings to band aid as needed.

## 2022-07-15 NOTE — CARE PLAN
Problem: Pain - Standard  Goal: Alleviation of pain or a reduction in pain to the patient’s comfort goal  Outcome: Progressing     Problem: Fall Risk  Goal: Patient will remain free from falls  Outcome: Progressing     Problem: Knowledge Deficit - Standard  Goal: Patient and family/care givers will demonstrate understanding of plan of care, disease process/condition, diagnostic tests and medications  Outcome: Progressing   The patient is Stable - Low risk of patient condition declining or worsening         Progress made toward(s) clinical / shift goals:  .    Patient is not progressing towards the following goals:

## 2022-07-26 NOTE — PROGRESS NOTES
"Pt contacted IR , asking for APRN to call back regarding symptoms.     States he gets \"woozy\" feeling when stands up quickly, has noticed low blood pressure. Has visual auras x 4-5 days different from usual aura in that no headache accompanies them. Has noticed heart rate seems like it runs faster and slower than usual sometimes. Feels better in the morning when he wakes up than he did before the procedure.     Also has a concurrent gout flare that is associated with general weakness, which is typical for an episode of acute gout. Report a fall with knee injury. Has been taking naproxen for pain, verbally acknowledges he is well aware of increased risk for gastric bleeding on DOAC/ aspirin along with NSDAIDs. He indicates he feels like the benefit of pain relief outweighs the risk of bleeding for him. He is able to take tylenol, advised to take instead.    Femoral access looks \"excellent\". Was at his cardiology appt earlier, and did not have a therapy change in regards to heart rate and dizziness. He examined the femoral site. These symptoms were present prior to the procedure but seem more frequent than prior to the procedure.      Pt states he is compliant with taking Xarelto 10 mg x 4 weeks for vertebral artery stent placement, then will drop back to 2.5 mg. Asking if the symptoms are related to the medication increase.     Discussed efficacy of increased Xarelto dose in the setting of recent vert stent, with management of stroke risk. Pt has appt with ANDREW next week. Offered appt later in the month when off Xarelto but he declined.  Has other questions about ICA stent patency and wishes to go over his imaging sooner rather than later. Will keep appt on 8/1.  "

## 2022-07-27 NOTE — PROGRESS NOTES
Telephone Appointment Visit    This telephone visit was initiated by the patient and they verbally consented. He was initially scheduled for a clinic appointment but request conversion to telephone follow up. The appointment was conducted by Shoaib White MD.    Reason for Call:  Hospital Follow-up    HPI:    Eb Poon was seen today in follow up after endovascular neurointervention performed by Shoaib White MD at [facility] on July 14, 2022. He was initially referred to our service by Topher Bynum MD. He is a 62 y.o. male established patient who recently contacted us regarding complaints of drop attacks and dizziness. He is also under the care of Claudia Alcocer MD and Michelle Peters MD.    History of Present Illness:   is well known to our practice. He has a history of polyvascular disease with interventions that include a left carotid endarterectomy, left subclavian stent, and bilateral iliac stents. He was initially referred to our practice in 2019 for left vertebral artery occlusion and right vertebral artery stenosis and drop attacks that first developed in 2014. Workup was performed to rule out Bowhunter's Syndrome. During the angiogram workup, he was noted to have an incidental 50-60% stenosis of the right ICA. He was admitted to [facility] in April and underwent stenting of the circumflex artery. Clinical notes indicate he was a potential Plavix nonresponder and was placed on ticagrelor and later switched to prasugrel. CTA to rule out TIA showed a progression of the right ICA stenosis to 90% and he was referred back for evaluation of this finding. He underwent an uncomplicated Right carotid artery stent placement on June 26, 2020 and discharged to home on June 27. A surveillance angiogram ordered in July 2021 and sent to his in-network hospital did not get performed. He contacted us in July 2022 with complaints of dizziness and drop attacks with a resulting ED  encounter, and requested evaluation of his records and recent imaging by the [facility] service. There appeared to be progression in the RIGHT vertebral artery stenosis but fortunately no acute stroke was noted on MRI.  Additional clinical history includes ongoing tobacco dependence (smokes a pipe), ischemic cardiomyopathy, dyslipidemia, anxiety, and diabetes mellitus. He underwent stent placement for the symptomatic right vertebral artery stenosis on July 14. His recovery was unremarkable and he was discharged to home on July 15 with a 30 day prescription for Xarelto 10 mg, with instructions to decrease back to his maintenance dose of 2.5 mg daily once the course ended. He contacted us on July 26 with complaints of ongoing dizziness and weakness not improved since the procedure.     Today, he reports no improvement in his symptoms described in the phone encounter note from 7/26/22. He has persistent dizziness and poor quality of life due to this complaint. He also has symptoms when he stands up quickly, and states he is unable to stand greater than 15 minutes at a time. He is quite concerned about the patency of his previously treated arteries. He is taking Xarelto 10 mg, and has questions about symptom correlation with the temporarily increased dose. He again expresses that he understands the correlation with concurrent NSAIDs and Xarelto with aspirin but prefers to take ibuprofen for pain. He continues to smoke. He is accompanied by Dr. White for review of symptoms and consideration of stent placement.      Labs / Images Reviewed:     Labs:    Latest Reference Range & Units 07/14/22 11:20   WBC 4.8 - 10.8 K/uL 6.2   RBC 4.70 - 6.10 M/uL 4.66 (L)   Hemoglobin 14.0 - 18.0 g/dL 13.9 (L)   Hematocrit 42.0 - 52.0 % 40.8 (L)   MCV 81.4 - 97.8 fL 87.6   MCH 27.0 - 33.0 pg 29.8   MCHC 33.7 - 35.3 g/dL 34.1   RDW 35.9 - 50.0 fL 40.2   Platelet Count 164 - 446 K/uL 284   MPV 9.0 - 12.9 fL 8.7 (L)   Sodium 135 - 145 mmol/L  139   Potassium 3.6 - 5.5 mmol/L 4.9   Chloride 96 - 112 mmol/L 107   Co2 20 - 33 mmol/L 19 (L)   Anion Gap 7.0 - 16.0  13.0   Glucose 65 - 99 mg/dL 111 (H)   Bun 8 - 22 mg/dL 19   Creatinine 0.50 - 1.40 mg/dL 0.96   GFR (CKD-EPI) >60 mL/min/1.73 m 2 88   Calcium 8.5 - 10.5 mg/dL 10.2      Latest Reference Range & Units 07/14/22 11:20   INR 0.87 - 1.13  1.15 (H)   PT 12.0 - 14.6 sec 14.5   (H): Data is abnormally high    Radiology:   Neurointerventional procedure 7/14/2022 at [facility]:  1.  Moderate to severe symptomatic right cervical vertebral stenosis.  2.  Successful stent placement.  3.  Patent right carotid stent.  4.  Moderate diffuse atherosclerotic disease in the distal cervical and petrous portion of the right internal carotid artery.  5.  There is no significant stenosis at the origin of the left carotid artery.  6.  Mild atherosclerotic disease in the petrous portion of the left internal carotid artery.  7.  There is no significant renal artery stenosis.  8.  Bilateral external and internal iliac stents are widely patent.            MRI brain 6/10/22 at [facility]:  1.  No acute intracranial abnormality.   2.  Age-related atrophy and a small amount of microvascular white matter   gliosis.   3.  Punctate metallic foreign body within the upper eyelid on the left.      CTA head/neck 1/12/22 at [facility]:  Calcific atherosclerosis of the right petrous and cavernous ICAs, stable.     Unchanged chronic occlusion of the distal left vertebral artery.     Right common carotid through internal carotid artery stent is patent without   occlusion.     Prior left internal carotid endarterectomy without stenosis or occlusion.     Left subclavian artery stent is also patent without occlusion.     Stenoses reported are derived by comparing the narrowest segment with the more   distal luminal diameter.       CTA head and neck July 1, 2020 at [facility]:    CTA head and neck  April 24, 2020 at [facility]:       Endovascular neurointervention June 26, 2020 at [facility]:  1.  An approximately 70-80% stenosis in the proximal portion of the right internal carotid artery. Successful right internal carotid artery stent placement without any immediate convocation.  2.  An approximately 50% fixed stenosis in the right vertebral artery at the level of C2. The stenosis is not changed in right lateral and left lateral positions.  3.  Moderate stenosis in the right internal carotid artery at the skull base and petrous portion.  4.  No significant stenosis at the origin of the left internal carotid artery.  5.  Widely patent left subclavian stent.  6.  Widely patent bilateral iliac stents.            Physician directed carotid/vertebral ultrasound July 31, 2019 at [facility]:  1.  There is antegrade flow in the dominant right vertebral artery. There is significant reduction in the peak systolic velocity during neck extension, right lateral, left lateral and flexion position when compared with the neutral position. The catheter   angiogram done on 10/15/2019 demonstrates an approximately 70% stenosis at the level of C1-C2 during left lateral position. 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     2. Increased peak systolic velocity of right internal carotid artery with atherosclerotic plaque consistent with 50-70% stenosis. This is in keeping with the diagnosis of 60% stenosis in the proximal right internal carotid artery on the recent catheter   Angiogram.     Catheter angiogram on July 15, 2019 at [facility]:  1.  There is focal 70% stenosis at the single dominant right vertebral artery at the level of C2 during left lateral position of the neck. The stenosis is only visualized on the left lateral position of the neck. This finding is concerning for positional   stenosis of the vertebral artery. However there is antegrade flow seen during left  lateral position. There is no occlusion.  2.  An approximately 50-60% stenosis in the proximal right internal artery.  3.  50% stenosis in the distal cervical segment of the right internal carotid artery.  4.  Irregular contour of the petrous portion of the right internal carotid artery. The differential diagnosis includes atherosclerotic disease and fibromuscular dysplasia.  5.  An approximately 70% stenosis at the midportion of the proximal left subclavian artery.  6.  Nonvisualization of the right vertebral artery.  7.  Patent bilateral common and external iliac stents.        Assessment and Plan:   Impression:   1. Vertebral artery stenosis 70%, status post stent placement, on Xarelto and aspirin.  2. Left vertebral artery chronic occlusion.   3. Right internal carotid artery 90% stenosis, status post stent placement, widely patent on angiography.  4. Left carotid artery stenosis, status post endarterectomy, widely patent on angiography.  5. Left subclavian artery stenosis, status post stent placement, widely patent on angiography.  6. Bilateral iliac stenosis, status post stent placements, widely patent on angiography.  7.  Coronary artery disease, status post circumflex stent placement 4/2020.  8.  Peripheral vascular disease.  9.  Diabetes mellitus.  10. Hypertension.  11. History of myocardial infarction.  12. Tobacco dependence.    Plan:   Shoaib White MD conducted the appointment today.  All questions were answered. We explained that the patient will need to have surveillance imaging after the procedure, which will be managed by us, and that future treatment depends on multiple factors including lab studies, imaging, and performance status. We reviewed known risk factors for vascular health that include uncontrolled hyperlipidemia, uncontrolled hyperglycemia, and tobacco use. Modifiable risk factors were discussed. We have planned that the next imaging study will an angiogram performed in 6-12  months, with the patient requesting 6 months. This is not a procedure performed at his in-network facilities, therefore we will request it to be done at [facility] with the Neuro Interventional Service. Xarelto 10 mg daily should be continued for the 30 day course and then he should resume his usual dose of 2.5 mg daily. Aspirin should be continued. Side effects, specifically bleeding, were again reviewed with instructions to contact us for minor bleeding and seek emergency care for hemorrhage.      Follow-up: catheter angiogram in 6 months    Total CALL Time Spent (mins): 10 minutes    WILD Odell.

## 2022-08-01 ENCOUNTER — HOSPITAL ENCOUNTER (OUTPATIENT)
Dept: RADIOLOGY | Facility: MEDICAL CENTER | Age: 62
End: 2022-08-01
Attending: NURSE PRACTITIONER
Payer: MEDICARE

## 2022-08-01 DIAGNOSIS — I65.01 VERTEBRAL ARTERY STENOSIS, RIGHT: ICD-10-CM

## 2022-11-04 NOTE — PROGRESS NOTES
"Pt contacted IR requesting return call for \"weird symptoms.\"   Call back with Dr. White on speaker. Pt reports bilateral leg weakness, feeling like they give out. Also reports tremors accompanied by nausea. Feels like he has vision problems and generalized weakness when he gets out of bed. He is concerned a stent is occluding and is specifically concerned about VB insufficiency.     After discussion, it is unclear if his complaints are related to in stent stenosis causing VB insufficiency. Discussed other possibilities behind his symptoms including orthostatic hypotension, and he has a f/u with his cardiologist later today and will discuss. However, VB insufficiency cannot be ruled out by noninvasive testing. Pt is due for surveillance angiogram to check carotid and vertebral artery stents in January 2023. In light of his complaints, will accelerate the surveillance plan. Will arrange f/u angiogram in December  "

## 2022-12-06 ENCOUNTER — PRE-ADMISSION TESTING (OUTPATIENT)
Dept: ADMISSIONS | Facility: MEDICAL CENTER | Age: 62
End: 2022-12-06
Attending: RADIOLOGY
Payer: MEDICARE

## 2022-12-06 RX ORDER — PRASUGREL 10 MG/1
10 TABLET, FILM COATED ORAL
COMMUNITY
Start: 2022-10-07

## 2022-12-06 RX ORDER — AMOXICILLIN 500 MG/1
500 CAPSULE ORAL
Status: ON HOLD | COMMUNITY
Start: 2022-12-03 | End: 2022-12-20

## 2022-12-06 RX ORDER — NITROGLYCERIN 0.4 MG/1
0.4 TABLET SUBLINGUAL PRN
COMMUNITY

## 2022-12-06 NOTE — OR NURSING
"During telephone pre admit appointment pt reports he currently has a dental abscess that started on 12/3/22. Pt currently on a 7 day antibiotic treament that he started on 12/3/22. Pt is scheduled for a \"deep dental cleaning\" tomorrow 12/7/22. Notified Ysabel LOWRY of above information. Okay to proceed with cleaning and angiogram scheduled for 12/12/22. Pt instructed to notify IR if he develops fever/chills or any systemic symptoms. Pt instructed to take 1/2 a dose of insulin the night before procedure and that it is okay to continue xarelto and prasugrel prior to procedure per Ysabel LOWRY instructions.   "

## 2022-12-12 ENCOUNTER — APPOINTMENT (OUTPATIENT)
Dept: RADIOLOGY | Facility: MEDICAL CENTER | Age: 62
End: 2022-12-12
Attending: RADIOLOGY
Payer: MEDICARE

## 2022-12-12 ENCOUNTER — HOSPITAL ENCOUNTER (OUTPATIENT)
Facility: MEDICAL CENTER | Age: 62
End: 2022-12-12
Attending: RADIOLOGY | Admitting: RADIOLOGY
Payer: MEDICARE

## 2022-12-12 VITALS
TEMPERATURE: 98 F | RESPIRATION RATE: 18 BRPM | DIASTOLIC BLOOD PRESSURE: 61 MMHG | WEIGHT: 167.11 LBS | BODY MASS INDEX: 25.33 KG/M2 | HEIGHT: 68 IN | HEART RATE: 86 BPM | OXYGEN SATURATION: 99 % | SYSTOLIC BLOOD PRESSURE: 115 MMHG

## 2022-12-12 DIAGNOSIS — I65.01 OCCLUSION AND STENOSIS OF RIGHT VERTEBRAL ARTERY: ICD-10-CM

## 2022-12-12 DIAGNOSIS — I65.02 OCCLUSION AND STENOSIS OF LEFT VERTEBRAL ARTERY: ICD-10-CM

## 2022-12-12 DIAGNOSIS — I65.21 CAROTID STENOSIS, RIGHT: ICD-10-CM

## 2022-12-12 DIAGNOSIS — I65.01 VERTEBRAL ARTERY STENOSIS, SYMPTOMATIC, WITHOUT INFARCTION, RIGHT: ICD-10-CM

## 2022-12-12 DIAGNOSIS — I65.21 OCCLUSION AND STENOSIS OF RIGHT CAROTID ARTERY: ICD-10-CM

## 2022-12-12 LAB
CREAT SERPL-MCNC: 1.01 MG/DL (ref 0.5–1.4)
ERYTHROCYTE [DISTWIDTH] IN BLOOD BY AUTOMATED COUNT: 44.9 FL (ref 35.9–50)
GFR SERPLBLD CREATININE-BSD FMLA CKD-EPI: 83 ML/MIN/1.73 M 2
HCT VFR BLD AUTO: 46.1 % (ref 42–52)
HGB BLD-MCNC: 15.5 G/DL (ref 14–18)
INR PPP: 1.25 (ref 0.87–1.13)
MCH RBC QN AUTO: 30 PG (ref 27–33)
MCHC RBC AUTO-ENTMCNC: 33.6 G/DL (ref 33.7–35.3)
MCV RBC AUTO: 89.2 FL (ref 81.4–97.8)
PLATELET # BLD AUTO: 220 K/UL (ref 164–446)
PMV BLD AUTO: 9.2 FL (ref 9–12.9)
PROTHROMBIN TIME: 15.6 SEC (ref 12–14.6)
RBC # BLD AUTO: 5.17 M/UL (ref 4.7–6.1)
WBC # BLD AUTO: 6.3 K/UL (ref 4.8–10.8)

## 2022-12-12 PROCEDURE — 85610 PROTHROMBIN TIME: CPT

## 2022-12-12 PROCEDURE — 36415 COLL VENOUS BLD VENIPUNCTURE: CPT

## 2022-12-12 PROCEDURE — 85027 COMPLETE CBC AUTOMATED: CPT

## 2022-12-12 PROCEDURE — 700105 HCHG RX REV CODE 258: Performed by: RADIOLOGY

## 2022-12-12 PROCEDURE — 82565 ASSAY OF CREATININE: CPT

## 2022-12-12 RX ORDER — INSULIN GLARGINE 100 [IU]/ML
20 INJECTION, SOLUTION SUBCUTANEOUS EVERY EVENING
COMMUNITY

## 2022-12-12 RX ORDER — SODIUM CHLORIDE 9 MG/ML
INJECTION, SOLUTION INTRAVENOUS
Status: COMPLETED | OUTPATIENT
Start: 2022-12-12 | End: 2022-12-12

## 2022-12-12 RX ORDER — DIAZEPAM 2 MG/1
2 TABLET ORAL 2 TIMES DAILY
COMMUNITY

## 2022-12-12 RX ADMIN — SODIUM CHLORIDE: 9 INJECTION, SOLUTION INTRAVENOUS at 10:23

## 2022-12-12 ASSESSMENT — FIBROSIS 4 INDEX: FIB4 SCORE: 0.85

## 2022-12-12 ASSESSMENT — PAIN DESCRIPTION - PAIN TYPE: TYPE: CHRONIC PAIN

## 2022-12-12 NOTE — OR NURSING
IR 3-4 hours behind for the day and pt states he can not wait that long. Dr White aware.Scheduling will reach out to him tomorrow

## 2022-12-19 ENCOUNTER — HOSPITAL ENCOUNTER (OUTPATIENT)
Facility: MEDICAL CENTER | Age: 62
End: 2022-12-20
Attending: RADIOLOGY | Admitting: INTERNAL MEDICINE
Payer: MEDICARE

## 2022-12-19 ENCOUNTER — APPOINTMENT (OUTPATIENT)
Dept: RADIOLOGY | Facility: MEDICAL CENTER | Age: 62
End: 2022-12-19
Attending: RADIOLOGY
Payer: MEDICARE

## 2022-12-19 LAB
GLUCOSE BLD STRIP.AUTO-MCNC: 101 MG/DL (ref 65–99)
GLUCOSE BLD STRIP.AUTO-MCNC: 119 MG/DL (ref 65–99)
GLUCOSE BLD STRIP.AUTO-MCNC: 82 MG/DL (ref 65–99)

## 2022-12-19 PROCEDURE — 700111 HCHG RX REV CODE 636 W/ 250 OVERRIDE (IP)

## 2022-12-19 PROCEDURE — 96372 THER/PROPH/DIAG INJ SC/IM: CPT | Mod: XU

## 2022-12-19 PROCEDURE — 160002 HCHG RECOVERY MINUTES (STAT)

## 2022-12-19 PROCEDURE — 160035 HCHG PACU - 1ST 60 MINS PHASE I

## 2022-12-19 PROCEDURE — 36225 PLACE CATH SUBCLAVIAN ART: CPT

## 2022-12-19 PROCEDURE — A9270 NON-COVERED ITEM OR SERVICE: HCPCS | Performed by: INTERNAL MEDICINE

## 2022-12-19 PROCEDURE — 700102 HCHG RX REV CODE 250 W/ 637 OVERRIDE(OP): Performed by: RADIOLOGY

## 2022-12-19 PROCEDURE — 700102 HCHG RX REV CODE 250 W/ 637 OVERRIDE(OP): Performed by: INTERNAL MEDICINE

## 2022-12-19 PROCEDURE — 700117 HCHG RX CONTRAST REV CODE 255: Performed by: RADIOLOGY

## 2022-12-19 PROCEDURE — 36223 PLACE CATH CAROTID/INOM ART: CPT | Mod: LT

## 2022-12-19 PROCEDURE — A9270 NON-COVERED ITEM OR SERVICE: HCPCS | Performed by: RADIOLOGY

## 2022-12-19 PROCEDURE — 160036 HCHG PACU - EA ADDL 30 MINS PHASE I

## 2022-12-19 PROCEDURE — G0378 HOSPITAL OBSERVATION PER HR: HCPCS

## 2022-12-19 PROCEDURE — 96374 THER/PROPH/DIAG INJ IV PUSH: CPT | Mod: XU

## 2022-12-19 PROCEDURE — 700111 HCHG RX REV CODE 636 W/ 250 OVERRIDE (IP): Mod: JG | Performed by: RADIOLOGY

## 2022-12-19 PROCEDURE — 82962 GLUCOSE BLOOD TEST: CPT | Mod: 91

## 2022-12-19 PROCEDURE — 99291 CRITICAL CARE FIRST HOUR: CPT | Performed by: INTERNAL MEDICINE

## 2022-12-19 PROCEDURE — 700111 HCHG RX REV CODE 636 W/ 250 OVERRIDE (IP): Performed by: INTERNAL MEDICINE

## 2022-12-19 PROCEDURE — 99153 MOD SED SAME PHYS/QHP EA: CPT

## 2022-12-19 RX ORDER — POLYETHYLENE GLYCOL 3350 17 G/17G
1 POWDER, FOR SOLUTION ORAL
Status: DISCONTINUED | OUTPATIENT
Start: 2022-12-19 | End: 2022-12-20 | Stop reason: HOSPADM

## 2022-12-19 RX ORDER — VALSARTAN 80 MG/1
40 TABLET ORAL EVERY MORNING
Status: DISCONTINUED | OUTPATIENT
Start: 2022-12-19 | End: 2022-12-20 | Stop reason: HOSPADM

## 2022-12-19 RX ORDER — SODIUM CHLORIDE 9 MG/ML
500 INJECTION, SOLUTION INTRAVENOUS
Status: DISCONTINUED | OUTPATIENT
Start: 2022-12-19 | End: 2022-12-19 | Stop reason: HOSPADM

## 2022-12-19 RX ORDER — PROMETHAZINE HYDROCHLORIDE 25 MG/1
12.5-25 SUPPOSITORY RECTAL EVERY 4 HOURS PRN
Status: DISCONTINUED | OUTPATIENT
Start: 2022-12-19 | End: 2022-12-20 | Stop reason: HOSPADM

## 2022-12-19 RX ORDER — PRASUGREL 10 MG/1
10 TABLET, FILM COATED ORAL DAILY
Status: DISCONTINUED | OUTPATIENT
Start: 2022-12-19 | End: 2022-12-20 | Stop reason: HOSPADM

## 2022-12-19 RX ORDER — ISOSORBIDE MONONITRATE 30 MG/1
30 TABLET, EXTENDED RELEASE ORAL
Status: DISCONTINUED | OUTPATIENT
Start: 2022-12-19 | End: 2022-12-20 | Stop reason: HOSPADM

## 2022-12-19 RX ORDER — ONDANSETRON 4 MG/1
4 TABLET, ORALLY DISINTEGRATING ORAL EVERY 4 HOURS PRN
Status: DISCONTINUED | OUTPATIENT
Start: 2022-12-19 | End: 2022-12-20 | Stop reason: HOSPADM

## 2022-12-19 RX ORDER — ACETAMINOPHEN 325 MG/1
650 TABLET ORAL EVERY 6 HOURS PRN
Status: DISCONTINUED | OUTPATIENT
Start: 2022-12-19 | End: 2022-12-20 | Stop reason: HOSPADM

## 2022-12-19 RX ORDER — ONDANSETRON 2 MG/ML
4 INJECTION INTRAMUSCULAR; INTRAVENOUS PRN
Status: DISCONTINUED | OUTPATIENT
Start: 2022-12-19 | End: 2022-12-19 | Stop reason: HOSPADM

## 2022-12-19 RX ORDER — EPTIFIBATIDE 0.75 MG/ML
5 INJECTION, SOLUTION INTRAVENOUS ONCE
Status: DISCONTINUED | OUTPATIENT
Start: 2022-12-19 | End: 2022-12-19

## 2022-12-19 RX ORDER — MIDAZOLAM HYDROCHLORIDE 1 MG/ML
.5-2 INJECTION INTRAMUSCULAR; INTRAVENOUS PRN
Status: DISCONTINUED | OUTPATIENT
Start: 2022-12-19 | End: 2022-12-19 | Stop reason: HOSPADM

## 2022-12-19 RX ORDER — PROMETHAZINE HYDROCHLORIDE 25 MG/1
12.5-25 TABLET ORAL EVERY 4 HOURS PRN
Status: DISCONTINUED | OUTPATIENT
Start: 2022-12-19 | End: 2022-12-20 | Stop reason: HOSPADM

## 2022-12-19 RX ORDER — HYDRALAZINE HYDROCHLORIDE 20 MG/ML
10-20 INJECTION INTRAMUSCULAR; INTRAVENOUS EVERY 4 HOURS PRN
Status: DISCONTINUED | OUTPATIENT
Start: 2022-12-19 | End: 2022-12-20 | Stop reason: HOSPADM

## 2022-12-19 RX ORDER — PROCHLORPERAZINE EDISYLATE 5 MG/ML
5-10 INJECTION INTRAMUSCULAR; INTRAVENOUS EVERY 4 HOURS PRN
Status: DISCONTINUED | OUTPATIENT
Start: 2022-12-19 | End: 2022-12-20 | Stop reason: HOSPADM

## 2022-12-19 RX ORDER — SODIUM CHLORIDE 9 MG/ML
1000 INJECTION, SOLUTION INTRAVENOUS CONTINUOUS
Status: DISCONTINUED | OUTPATIENT
Start: 2022-12-19 | End: 2022-12-20 | Stop reason: HOSPADM

## 2022-12-19 RX ORDER — HEPARIN SODIUM 1000 [USP'U]/ML
INJECTION, SOLUTION INTRAVENOUS; SUBCUTANEOUS
Status: COMPLETED
Start: 2022-12-19 | End: 2022-12-19

## 2022-12-19 RX ORDER — AMOXICILLIN 250 MG
2 CAPSULE ORAL 2 TIMES DAILY
Status: DISCONTINUED | OUTPATIENT
Start: 2022-12-19 | End: 2022-12-20 | Stop reason: HOSPADM

## 2022-12-19 RX ORDER — HEPARIN SODIUM 1000 [USP'U]/ML
5000 INJECTION, SOLUTION INTRAVENOUS; SUBCUTANEOUS ONCE
Status: COMPLETED | OUTPATIENT
Start: 2022-12-19 | End: 2022-12-19

## 2022-12-19 RX ORDER — ONDANSETRON 2 MG/ML
4 INJECTION INTRAMUSCULAR; INTRAVENOUS EVERY 4 HOURS PRN
Status: DISCONTINUED | OUTPATIENT
Start: 2022-12-19 | End: 2022-12-20 | Stop reason: HOSPADM

## 2022-12-19 RX ORDER — DIAZEPAM 2 MG/1
2 TABLET ORAL 2 TIMES DAILY
Status: DISCONTINUED | OUTPATIENT
Start: 2022-12-19 | End: 2022-12-20 | Stop reason: HOSPADM

## 2022-12-19 RX ORDER — MIDAZOLAM HYDROCHLORIDE 1 MG/ML
INJECTION INTRAMUSCULAR; INTRAVENOUS
Status: COMPLETED
Start: 2022-12-19 | End: 2022-12-19

## 2022-12-19 RX ORDER — LABETALOL HYDROCHLORIDE 5 MG/ML
10-20 INJECTION, SOLUTION INTRAVENOUS EVERY 4 HOURS PRN
Status: DISCONTINUED | OUTPATIENT
Start: 2022-12-19 | End: 2022-12-20 | Stop reason: HOSPADM

## 2022-12-19 RX ORDER — PRASUGREL 10 MG/1
10 TABLET, FILM COATED ORAL
Status: DISCONTINUED | OUTPATIENT
Start: 2022-12-19 | End: 2022-12-19

## 2022-12-19 RX ORDER — EPTIFIBATIDE 2 MG/ML
INJECTION, SOLUTION INTRAVENOUS
Status: COMPLETED
Start: 2022-12-19 | End: 2022-12-19

## 2022-12-19 RX ORDER — EPTIFIBATIDE 2 MG/ML
10 INJECTION, SOLUTION INTRAVENOUS ONCE
Status: COMPLETED | OUTPATIENT
Start: 2022-12-19 | End: 2022-12-19

## 2022-12-19 RX ORDER — NICOTINE 21 MG/24HR
14 PATCH, TRANSDERMAL 24 HOURS TRANSDERMAL
Status: DISCONTINUED | OUTPATIENT
Start: 2022-12-19 | End: 2022-12-20 | Stop reason: HOSPADM

## 2022-12-19 RX ORDER — HYDRALAZINE HYDROCHLORIDE 20 MG/ML
10 INJECTION INTRAMUSCULAR; INTRAVENOUS ONCE
Status: COMPLETED | OUTPATIENT
Start: 2022-12-19 | End: 2022-12-19

## 2022-12-19 RX ORDER — BISACODYL 10 MG
10 SUPPOSITORY, RECTAL RECTAL
Status: DISCONTINUED | OUTPATIENT
Start: 2022-12-19 | End: 2022-12-20 | Stop reason: HOSPADM

## 2022-12-19 RX ADMIN — HEPARIN SODIUM 5000 UNITS: 1000 INJECTION, SOLUTION INTRAVENOUS; SUBCUTANEOUS at 09:43

## 2022-12-19 RX ADMIN — MIDAZOLAM HYDROCHLORIDE 1 MG: 1 INJECTION, SOLUTION INTRAMUSCULAR; INTRAVENOUS at 09:04

## 2022-12-19 RX ADMIN — HYDRALAZINE HYDROCHLORIDE 10 MG: 20 INJECTION INTRAMUSCULAR; INTRAVENOUS at 11:02

## 2022-12-19 RX ADMIN — IOHEXOL 158 ML: 300 INJECTION, SOLUTION INTRAVENOUS at 10:45

## 2022-12-19 RX ADMIN — ACETAMINOPHEN 650 MG: 325 TABLET, FILM COATED ORAL at 18:09

## 2022-12-19 RX ADMIN — MIDAZOLAM HYDROCHLORIDE 1 MG: 1 INJECTION, SOLUTION INTRAMUSCULAR; INTRAVENOUS at 09:10

## 2022-12-19 RX ADMIN — FENTANYL CITRATE 25 MCG: 50 INJECTION, SOLUTION INTRAMUSCULAR; INTRAVENOUS at 09:19

## 2022-12-19 RX ADMIN — EPTIFIBATIDE 10 MG: 2 INJECTION, SOLUTION INTRAVENOUS at 10:00

## 2022-12-19 RX ADMIN — FENTANYL CITRATE 50 MCG: 50 INJECTION, SOLUTION INTRAMUSCULAR; INTRAVENOUS at 09:04

## 2022-12-19 RX ADMIN — EPTIFIBATIDE 5 ML/HR: 75 INJECTION INTRAVENOUS at 09:52

## 2022-12-19 RX ADMIN — DIAZEPAM 2 MG: 2 TABLET ORAL at 21:51

## 2022-12-19 RX ADMIN — HYDRALAZINE HYDROCHLORIDE 20 MG: 20 INJECTION INTRAMUSCULAR; INTRAVENOUS at 14:05

## 2022-12-19 RX ADMIN — VALSARTAN 40 MG: 80 TABLET, FILM COATED ORAL at 12:04

## 2022-12-19 RX ADMIN — FENTANYL CITRATE 25 MCG: 50 INJECTION, SOLUTION INTRAMUSCULAR; INTRAVENOUS at 09:46

## 2022-12-19 RX ADMIN — INSULIN GLARGINE-YFGN 20 UNITS: 100 INJECTION, SOLUTION SUBCUTANEOUS at 18:16

## 2022-12-19 RX ADMIN — PRASUGREL 10 MG: 10 TABLET, FILM COATED ORAL at 18:09

## 2022-12-19 RX ADMIN — DIAZEPAM 2 MG: 2 TABLET ORAL at 14:51

## 2022-12-19 RX ADMIN — NICOTINE 14 MG: 14 PATCH TRANSDERMAL at 14:51

## 2022-12-19 ASSESSMENT — PATIENT HEALTH QUESTIONNAIRE - PHQ9
1. LITTLE INTEREST OR PLEASURE IN DOING THINGS: NOT AT ALL
2. FEELING DOWN, DEPRESSED, IRRITABLE, OR HOPELESS: NOT AT ALL
SUM OF ALL RESPONSES TO PHQ9 QUESTIONS 1 AND 2: 0

## 2022-12-19 ASSESSMENT — ENCOUNTER SYMPTOMS
SENSORY CHANGE: 0
PALPITATIONS: 0
FEVER: 0
BRUISES/BLEEDS EASILY: 0
NAUSEA: 0
COUGH: 0
BLURRED VISION: 0
NERVOUS/ANXIOUS: 0
DIZZINESS: 0
CHILLS: 0
SPEECH CHANGE: 0
SORE THROAT: 0
SPUTUM PRODUCTION: 0
ABDOMINAL PAIN: 0
MYALGIAS: 0
DEPRESSION: 0
BACK PAIN: 0
VOMITING: 0
HEADACHES: 0
SHORTNESS OF BREATH: 0

## 2022-12-19 ASSESSMENT — FIBROSIS 4 INDEX
FIB4 SCORE: 1.099533012390479206
FIB4 SCORE: 1.099533012390479206

## 2022-12-19 ASSESSMENT — PAIN DESCRIPTION - PAIN TYPE: TYPE: ACUTE PAIN

## 2022-12-19 NOTE — CONSULTS
Critical Care Consultation    Date of consult: 12/19/2022    Referring Physician  Dr. Shoaib White M.D.    Reason for Consultation  Vertebral artery stent placement    History of Presenting Illness  62 y.o. male who presented 12/19/2022 with a past medical history significant for diabetes, CAD, congestive heart failure and vascular disease who was undergone previous stenting of his intracerebral arteries who has persistent dizziness and was found to have flow-limiting stenosis of his vertebral artery.  He underwent an uncomplicated vertebral artery stent placement today with Dr. White and was brought to the intensive care unit postprocedure for close monitoring.  Patient has unchanged chronic dizziness postprocedure and no new complaints.    Code Status  Full Code    Review of Systems  Review of Systems   Constitutional:  Positive for malaise/fatigue. Negative for chills and fever.   HENT:  Negative for congestion and sore throat.    Eyes:  Negative for blurred vision.   Respiratory:  Negative for cough, sputum production and shortness of breath.    Cardiovascular:  Negative for chest pain, palpitations and leg swelling.   Gastrointestinal:  Negative for abdominal pain, nausea and vomiting.   Genitourinary:  Negative for dysuria.   Musculoskeletal:  Negative for back pain and myalgias.   Skin:  Negative for rash.   Neurological:  Negative for dizziness, sensory change, speech change and headaches.   Endo/Heme/Allergies:  Does not bruise/bleed easily.   Psychiatric/Behavioral:  Negative for depression. The patient is not nervous/anxious.    All other systems reviewed and are negative.    Past Medical History   has a past medical history of Arrhythmia, Arthritis, Bowel habit changes (06/10/2020), Breath shortness, CAD (coronary artery disease), Cataract (2020), Congestive heart failure (HCC), Dental abscess (12/03/2022), Deviated septum, DIABETES MELLITUS (06/19/2020), Dizziness, Fatigue, Gout, Heart  disease, Hemorrhagic disorder (HCC) (07/13/2022), High cholesterol, Hypertension, Migraine aura without headache, Myocardial infarct (HCC) (2007), Other and unspecified angina pectoris, PAD (peripheral artery disease), Pain, Psychiatric problem, Seizure (HCC), and Stroke ([facility]).    Surgical History   has a past surgical history that includes carotid endarterectomy (Left, 05/20/2015); recovery (06/25/2015); other cardiac surgery; stent placement (Left); stent placement (Right); stent placement; and cataract extraction with iol (Bilateral, 2020).    Family History  family history is not on file.    Social History   reports that he has been smoking cigarettes. He has a 20.00 pack-year smoking history. He has quit using smokeless tobacco. He reports that he does not drink alcohol and does not use drugs.    Medications  Home Medications       Reviewed by Dawit Del Toro R.N. (Registered Nurse) on 12/19/22 at 1342  Med List Status: Complete     Medication Last Dose Status   amoxicillin (AMOXIL) 500 MG Cap complete Active   aspirin EC (ECOTRIN) 81 MG Tablet Delayed Response 12/18/2022 Active   B Complex Vitamins (VITAMIN-B COMPLEX PO) 2wks ago Active   diazePAM (VALIUM) 2 MG Tab 12/19/2022 Active   insulin glargine 100 UNIT/ML SC SOLN 12/18/2022 Active   isosorbide mononitrate SR (IMDUR) 30 MG TABLET SR 24 HR unk Active   nitroglycerin (NITROSTAT) 0.4 MG SL Tab unk Active   prasugrel (EFFIENT) 10 MG Tab 2d ago Active   rivaroxaban (XARELTO) 2.5 MG tablet 12/19/2022 Active   valsartan (DIOVAN) 40 MG Tab 12/17/2022 Active   vitamin D3 (CHOLECALCIFEROL) 5000 Unit (125 mcg) Tab 12/17/2022 Active                  Current Facility-Administered Medications   Medication Dose Route Frequency Provider Last Rate Last Admin    NS infusion 1,000 mL  1,000 mL Intravenous Continuous Shoaib White M.D.        prasugrel (EFFIENT) tablet 10 mg  10 mg Oral DAILY Shoaib White M.D.        hydrALAZINE (APRESOLINE) injection 10-20 mg   10-20 mg Intravenous Q4HRS PRN Jeremy M Gonda, M.D.   20 mg at 12/19/22 1405    labetalol (NORMODYNE/TRANDATE) injection 10-20 mg  10-20 mg Intravenous Q4HRS PRN Jeremy M Gonda, M.D.        aspirin EC (ECOTRIN) tablet 81 mg  81 mg Oral DAILY Jeremy M Gonda, M.D.        insulin GLARGINE (Lantus,Semglee) injection  20 Units Subcutaneous Q EVENING Jeremy M Gonda, M.D.        isosorbide mononitrate SR (IMDUR) tablet 30 mg  30 mg Oral QDAY PRN Jeremy M Gonda, M.D.        rivaroxaban (Xarelto) tablet 2.5 mg  2.5 mg Oral Q EVENING Jeremy M Gonda, M.D.        valsartan (DIOVAN) tablet 40 mg  40 mg Oral QAM Jeremy M Gonda, M.D.   40 mg at 12/19/22 1204    vitamin D3 (cholecalciferol) tablet 5,000 Units  5,000 Units Oral [facility] Jeremy M Gonda, M.D.        diazePAM (VALIUM) tablet 2 mg  2 mg Oral BID Jeremy M Gonda, M.D.   2 mg at 12/19/22 1451    nicotine (NICODERM) 14 MG/24HR 14 mg  14 mg Transdermal Daily-0600 Jeremy M Gonda, M.D.   14 mg at 12/19/22 1451    And    nicotine polacrilex (NICORETTE) 2 MG piece 2 mg  2 mg Oral Q HOUR PRN Jeremy M Gonda, M.D.        senna-docusate (PERICOLACE or SENOKOT S) 8.6-50 MG per tablet 2 Tablet  2 Tablet Oral BID Jeremy M Gonda, M.D.        And    polyethylene glycol/lytes (MIRALAX) PACKET 1 Packet  1 Packet Oral QDAY PRN Jeremy M Gonda, M.D.        And    magnesium hydroxide (MILK OF MAGNESIA) suspension 30 mL  30 mL Oral QDAY PRN Jeremy M Gonda, M.D.        And    bisacodyl (DULCOLAX) suppository 10 mg  10 mg Rectal QDAY PRN Jeremy M Gonda, M.D.        ondansetron (ZOFRAN) syringe/vial injection 4 mg  4 mg Intravenous Q4HRS PRN Jeremy M Gonda, M.D.        ondansetron (ZOFRAN ODT) dispertab 4 mg  4 mg Oral Q4HRS PRN Jeremy M Gonda, M.D.        promethazine (PHENERGAN) tablet 12.5-25 mg  12.5-25 mg Oral Q4HRS PRN Jeremy M Gonda, M.D.        promethazine (PHENERGAN) suppository 12.5-25 mg  12.5-25 mg Rectal Q4HRS PRN Jeremy M Gonda, M.D.        prochlorperazine (COMPAZINE) injection 5-10 mg   5-10 mg Intravenous Q4HRS PRN Jeremy M Gonda, M.D.        acetaminophen (Tylenol) tablet 650 mg  650 mg Oral Q6HRS PRN Jeremy M Gonda, M.D.           Allergies  Allergies   Allergen Reactions    Ranexa [Ranolazine]      Dropped heart rate low and worsened lightheadedness.     Statins [Hmg-Coa-R Inhibitors]      Muscle aches.       Vital Signs last 24 hours  Temp:  [36 °C (96.8 °F)-36.3 °C (97.4 °F)] 36.3 °C (97.4 °F)  Pulse:  [] 100  Resp:  [10-20] 17  BP: (119-199)/() 119/55  SpO2:  [96 %-99 %] 97 %    Physical Exam  Physical Exam  Vitals and nursing note reviewed.   Constitutional:       General: He is not in acute distress.     Appearance: He is well-developed.   HENT:      Head: Normocephalic and atraumatic.      Nose: Nose normal.      Mouth/Throat:      Mouth: Mucous membranes are moist.      Pharynx: Oropharynx is clear. No oropharyngeal exudate.   Eyes:      General: No scleral icterus.     Conjunctiva/sclera: Conjunctivae normal.      Pupils: Pupils are equal, round, and reactive to light.   Neck:      Vascular: No JVD.   Cardiovascular:      Rate and Rhythm: Normal rate and regular rhythm.      Pulses: Normal pulses.      Heart sounds: Normal heart sounds. No murmur heard.  Pulmonary:      Effort: Pulmonary effort is normal. No respiratory distress.      Breath sounds: Normal breath sounds. No stridor. No wheezing.   Abdominal:      General: Bowel sounds are normal. There is no distension.      Palpations: Abdomen is soft.      Tenderness: There is no abdominal tenderness. There is no guarding.   Musculoskeletal:         General: No tenderness.      Cervical back: Neck supple. No tenderness.      Right lower leg: No edema.      Left lower leg: No edema.      Comments: Right femoral artery puncture site is clean/dry/intact without hematoma   Skin:     General: Skin is warm and dry.      Capillary Refill: Capillary refill takes less than 2 seconds.      Coloration: Skin is not pale.    Neurological:      General: No focal deficit present.      Mental Status: He is alert and oriented to person, place, and time.      Cranial Nerves: No cranial nerve deficit.      Sensory: No sensory deficit.      Motor: No weakness.   Psychiatric:         Mood and Affect: Mood normal.         Behavior: Behavior normal.         Thought Content: Thought content normal.       Fluids    Intake/Output Summary (Last 24 hours) at 12/19/2022 1605  Last data filed at 12/19/2022 1300  Gross per 24 hour   Intake --   Output 700 ml   Net -700 ml       Laboratory  Recent Results (from the past 48 hour(s))   POCT glucose device results    Collection Time: 12/19/22 12:21 PM   Result Value Ref Range    POC Glucose, Blood 82 65 - 99 mg/dL       Imaging  No orders to display       Assessment/Plan  * Stenosis of right vertebral artery  Assessment & Plan  Status post vertebral artery stent placement 12/19  Close neurologic monitoring  Strict blood pressure control with goal SBP less than 160  Antiplatelet therapy, resume therapeutic anticoagulation (Xarelto)  Tobacco cessation education provided    Chronically on benzodiazepine therapy- (present on admission)  Assessment & Plan  Continue scheduled diazepam to prevent withdrawal    Dyslipidemia- (present on admission)  Assessment & Plan  Diet controlled    CAD (coronary artery disease)- (present on admission)  Assessment & Plan  With recent PCI  Continue dual antiplatelet therapy blood pressure control    Type 2 diabetes mellitus (HCC)- (present on admission)  Assessment & Plan  Insulin sliding scale  Diabetic diet monitoring glucose with goal between 120 and 180    Tobacco abuse  Assessment & Plan  Encouraged tobacco cessation, education resources provided  Nicotine replacement patch        Discussed patient condition and risk of morbidity and/or mortality with Family, RN, RT, Pharmacy, Charge nurse / hot rounds, Patient, and neuro IR .    The patient remains critically ill.   Critical care time = 31 minutes in directly providing and coordinating critical care and extensive data review.  No time overlap and excludes procedures.    Please note that this dictation was created using voice recognition software. I have made every reasonable attempt to correct obvious errors, but there may be errors of grammar and possibly content that I did not discover before finalizing the note.

## 2022-12-19 NOTE — OR NURSING
Preop complete. Iv placed and infusing. Belongings in locker. All questions answered. Call light in reach and bed in low position. Fall risk armband in place and instructed to call for any needs.

## 2022-12-19 NOTE — OR SURGEON
Immediate Post- Operative Note        Findings: Right vertebral origin stenosis      Procedure(s): Right vertebral stent placement       Estimated Blood Loss: Less than 5 ml        Complications: None            12/19/2022     10:16 AM     Shoaib White M.D.

## 2022-12-19 NOTE — OR NURSING
1033- Pt arrives to PACU from OR on room air. Report received. Pt neurologically intact, R groin site with scant drainage. Pedal pulses heard on doppler.    1109- Pt wife Marisa called and updated on pt status and POC.    1215- Report called to Dawit GRAY.

## 2022-12-19 NOTE — H&P
"History and Physical    Date: 12/19/2022    PCP: Pcp Pt States None        HPI: This is a 62 y.o. male who is presenting with dizziness.S/P vertebral and carotid stent     Past Medical History:   Diagnosis Date    Arrhythmia     Arthritis     Bowel habit changes 06/10/2020    Constipation    Breath shortness     with exertion, been going on for years    CAD (coronary artery disease)     Cataract 2020    IOL bilat    Congestive heart failure (HCC)     Dental abscess 12/03/2022    pt currently on 7 day antibiotic treament 12/3/22, pt has \"deep dental cleaning\" scheduled for 12/7/22.    Deviated septum     DIABETES MELLITUS 06/19/2020    Insulin    Dizziness     Fatigue     Gout     Heart disease     Hemorrhagic disorder (HCC) 07/13/2022    on anticoagulant    High cholesterol     Hypertension     Migraine aura without headache     Myocardial infarct (HCC) 2007    MI in 2010 and 2020    Other and unspecified angina pectoris     PAD (peripheral artery disease)     Pain     back pain    Psychiatric problem     anxiety and depression    Seizure (HCC)     unsure what kind \"drop attacks\", pt states last one was 12/3/22    Stroke ([facility])     TIAs       Past Surgical History:   Procedure Laterality Date    CATARACT EXTRACTION WITH IOL Bilateral 2020    RECOVERY  06/25/2015    Procedure: VASCULAR CASE BYNUM-ABDOMINAL AORTOGRAM W/ RUNOFF, POSSIBLE INTERVENTION ICD 9: 433.10, 435.3, 443.9;  Surgeon: [facility];  Location: SURGERY PRE-POST PROC UNIT [facility] [place];  Service:     CAROTID ENDARTERECTOMY Left 05/20/2015    Procedure: CAROTID ENDARTERECTOMY ;  Surgeon: Topher Bynum M.D.;  Location: SURGERY [facility];  Service:     OTHER CARDIAC SURGERY      left, stent placement    STENT PLACEMENT Left     carotid artery    STENT PLACEMENT Right     ICA (internal carotid artery)    STENT PLACEMENT      iliac artery       Current Facility-Administered Medications   Medication Dose Route Frequency Provider Last Rate Last Admin    " NS infusion 1,000 mL  1,000 mL Intravenous Continuous Shoaib White M.D.            Social History     Socioeconomic History    Marital status:      Spouse name: Not on file    Number of children: Not on file    Years of education: Not on file    Highest education level: Not on file   Occupational History    Not on file   Tobacco Use    Smoking status: Every Day     Packs/day: 0.50     Years: 40.00     Pack years: 20.00     Types: Cigarettes    Smokeless tobacco: Former   Vaping Use    Vaping Use: Never used   Substance and Sexual Activity    Alcohol use: No     Comment: quit 8 years ago    Drug use: No    Sexual activity: Not on file   Other Topics Concern    Not on file   Social History Narrative    Not on file     Social Determinants of Health     Financial Resource Strain: Not on file   Food Insecurity: Not on file   Transportation Needs: Not on file   Physical Activity: Not on file   Stress: Not on file   Social Connections: Not on file   Intimate Partner Violence: Not on file   Housing Stability: Not on file       History reviewed. No pertinent family history.    Allergies:  Ranexa [ranolazine] and Statins [hmg-coa-r inhibitors]    Review of Systems:  dizziness    Physical Exam  Constitutional:       General: He is not in acute distress.     Appearance: He is well-developed. He is not diaphoretic.   Eyes:      General: No scleral icterus.  Pulmonary:      Effort: Pulmonary effort is normal. No respiratory distress.   Abdominal:      General: There is no distension.      Palpations: Abdomen is soft.   Skin:     General: Skin is warm and dry.      Coloration: Skin is not pale.      Findings: No erythema or rash.   Neurological:      Mental Status: He is alert and oriented to person, place, and time.      Cranial Nerves: No cranial nerve deficit.      Coordination: Coordination normal.   Psychiatric:         Behavior: Behavior normal.         Thought Content: Thought content normal.         Judgment:  Judgment normal.       Vital Signs  Blood Pressure: (!) 144/65   Temperature: 36 °C (96.8 °F)   Pulse: 67   Respiration: 16   Pulse Oximetry: 99 %        Labs:                    Radiology:  No orders to display             Assessment and Plan:This is a 62 y.o. male who is presenting with dizziness.S/P vertebral and carotid stent     Plan: angiogram and possible intervention

## 2022-12-19 NOTE — PROGRESS NOTES
Pt presents to IR 2. Pt was consented by MD at bedside, confirmed by this RN and consent at bedside. Pt transferred to IR table in supine position. Patient underwent a cerebral angiogram  by Dr. White. Procedure site was marked by MD and verified using imaging guidance. Pt placed on monitor, prepped and draped in a sterile fashion. Vitals were taken every 5 minutes and remained stable during procedure (see doc flow sheet for results). CO2 waveform capnography was monitored and remained WNL throughout procedure. Report called to Anne GRAY. Pt transported by RN on patient gurney to PACU.    [employer] Perry [place]berg coronary stent 3mm x12mm.    REF: [id number]  LOT: [id number]  Exp: 03/18/2024    Placed in the right vertebral artery      [employer] Angio seal   REF:[id number]  LOT:[id number]  EXP: 08/31/2023  Deployed [id number]

## 2022-12-20 VITALS
DIASTOLIC BLOOD PRESSURE: 66 MMHG | RESPIRATION RATE: 60 BRPM | SYSTOLIC BLOOD PRESSURE: 123 MMHG | HEART RATE: 84 BPM | WEIGHT: 174.16 LBS | HEIGHT: 68 IN | BODY MASS INDEX: 26.4 KG/M2 | TEMPERATURE: 98.7 F | OXYGEN SATURATION: 97 %

## 2022-12-20 DIAGNOSIS — I65.01 VERTEBRAL ARTERY STENOSIS, RIGHT: ICD-10-CM

## 2022-12-20 LAB
ANION GAP SERPL CALC-SCNC: 10 MMOL/L (ref 7–16)
BASOPHILS # BLD AUTO: 0.3 % (ref 0–1.8)
BASOPHILS # BLD: 0.02 K/UL (ref 0–0.12)
BUN SERPL-MCNC: 17 MG/DL (ref 8–22)
CALCIUM SERPL-MCNC: 9.3 MG/DL (ref 8.5–10.5)
CHLORIDE SERPL-SCNC: 107 MMOL/L (ref 96–112)
CO2 SERPL-SCNC: 23 MMOL/L (ref 20–33)
CREAT SERPL-MCNC: 1.05 MG/DL (ref 0.5–1.4)
EOSINOPHIL # BLD AUTO: 0.11 K/UL (ref 0–0.51)
EOSINOPHIL NFR BLD: 1.9 % (ref 0–6.9)
ERYTHROCYTE [DISTWIDTH] IN BLOOD BY AUTOMATED COUNT: 44.8 FL (ref 35.9–50)
GFR SERPLBLD CREATININE-BSD FMLA CKD-EPI: 79 ML/MIN/1.73 M 2
GLUCOSE BLD STRIP.AUTO-MCNC: 81 MG/DL (ref 65–99)
GLUCOSE BLD STRIP.AUTO-MCNC: 94 MG/DL (ref 65–99)
GLUCOSE SERPL-MCNC: 79 MG/DL (ref 65–99)
HCT VFR BLD AUTO: 40.5 % (ref 42–52)
HGB BLD-MCNC: 13.5 G/DL (ref 14–18)
IMM GRANULOCYTES # BLD AUTO: 0.03 K/UL (ref 0–0.11)
IMM GRANULOCYTES NFR BLD AUTO: 0.5 % (ref 0–0.9)
LYMPHOCYTES # BLD AUTO: 1.56 K/UL (ref 1–4.8)
LYMPHOCYTES NFR BLD: 26.6 % (ref 22–41)
MAGNESIUM SERPL-MCNC: 2 MG/DL (ref 1.5–2.5)
MCH RBC QN AUTO: 29.6 PG (ref 27–33)
MCHC RBC AUTO-ENTMCNC: 33.3 G/DL (ref 33.7–35.3)
MCV RBC AUTO: 88.8 FL (ref 81.4–97.8)
MONOCYTES # BLD AUTO: 0.41 K/UL (ref 0–0.85)
MONOCYTES NFR BLD AUTO: 7 % (ref 0–13.4)
NEUTROPHILS # BLD AUTO: 3.73 K/UL (ref 1.82–7.42)
NEUTROPHILS NFR BLD: 63.7 % (ref 44–72)
NRBC # BLD AUTO: 0 K/UL
NRBC BLD-RTO: 0 /100 WBC
PLATELET # BLD AUTO: 239 K/UL (ref 164–446)
PMV BLD AUTO: 8.8 FL (ref 9–12.9)
POTASSIUM SERPL-SCNC: 4 MMOL/L (ref 3.6–5.5)
RBC # BLD AUTO: 4.56 M/UL (ref 4.7–6.1)
SODIUM SERPL-SCNC: 140 MMOL/L (ref 135–145)
WBC # BLD AUTO: 5.9 K/UL (ref 4.8–10.8)

## 2022-12-20 PROCEDURE — 700102 HCHG RX REV CODE 250 W/ 637 OVERRIDE(OP): Performed by: RADIOLOGY

## 2022-12-20 PROCEDURE — 700102 HCHG RX REV CODE 250 W/ 637 OVERRIDE(OP): Performed by: INTERNAL MEDICINE

## 2022-12-20 PROCEDURE — 83735 ASSAY OF MAGNESIUM: CPT

## 2022-12-20 PROCEDURE — 85025 COMPLETE CBC W/AUTO DIFF WBC: CPT

## 2022-12-20 PROCEDURE — 99239 HOSP IP/OBS DSCHRG MGMT >30: CPT | Performed by: INTERNAL MEDICINE

## 2022-12-20 PROCEDURE — G0378 HOSPITAL OBSERVATION PER HR: HCPCS

## 2022-12-20 PROCEDURE — 82962 GLUCOSE BLOOD TEST: CPT | Mod: 91

## 2022-12-20 PROCEDURE — A9270 NON-COVERED ITEM OR SERVICE: HCPCS | Performed by: RADIOLOGY

## 2022-12-20 PROCEDURE — 80048 BASIC METABOLIC PNL TOTAL CA: CPT

## 2022-12-20 PROCEDURE — A9270 NON-COVERED ITEM OR SERVICE: HCPCS | Performed by: INTERNAL MEDICINE

## 2022-12-20 RX ADMIN — RIVAROXABAN 2.5 MG: 2.5 TABLET, FILM COATED ORAL at 05:46

## 2022-12-20 RX ADMIN — DIAZEPAM 2 MG: 2 TABLET ORAL at 05:45

## 2022-12-20 RX ADMIN — VALSARTAN 40 MG: 80 TABLET, FILM COATED ORAL at 05:46

## 2022-12-20 RX ADMIN — NICOTINE 14 MG: 14 PATCH TRANSDERMAL at 05:45

## 2022-12-20 RX ADMIN — CHOLECALCIFEROL TAB 125 MCG (5000 UNIT) 5000 UNITS: 125 TAB at 05:46

## 2022-12-20 RX ADMIN — ASPIRIN 81 MG: 81 TABLET, COATED ORAL at 05:45

## 2022-12-20 ASSESSMENT — PAIN DESCRIPTION - PAIN TYPE
TYPE: ACUTE PAIN
TYPE: ACUTE PAIN

## 2022-12-20 NOTE — ASSESSMENT & PLAN NOTE
Status post vertebral artery stent placement 12/19  Close neurologic monitoring  Strict blood pressure control with goal SBP less than 160  Antiplatelet therapy, resume therapeutic anticoagulation (Xarelto)  Tobacco cessation education provided

## 2022-12-20 NOTE — DISCHARGE INSTRUCTIONS
Post-angioseal instructions: no lifting greater than 5 lbs and no baths/ swimming/ soaking in tub for 5 days, then resume activity as tolerated. Shower OK. OK to change dressings to band aid as needed.

## 2022-12-20 NOTE — PROGRESS NOTES
S/p uncomplicated endovascular neurointervention on 12/19/22 by Shoaib White MD (ANDREW, x [phone]): right vertebral artery origin stent placement for symptomatic, critical stenosis. Admitted to ICU for post procedure neurologic monitoring and supportive care.     Today, the patient denies new complaints. Has been up to commode. Presenting symptoms include near syncope and dizziness, also under the care of a cardiologist. Has several questions about progression of stenosis. Pt still smokes. Support person at bedside.  Awake, oriented x 4. Face symmetric, speech fluent. No drift. RIGHT angio site with no active oozing, no ecchymosis, erythema, or swelling. Distal pulses 1+, skin warm, cap refill <2 sec.     From a NeuroInterventional Service standpoint, OK to discharge to home when medically cleared by Hospitalist Service. Patient to follow up in our clinic in 2-6 weeks, our office to arrange appointment. He prefers virtual appt. Next imaging follow up angiogram in 1 year unless symptoms return. Neurovascular anatomy questions answered. Discussed that near syncope could also be cardiogenic and the need for him to continue close follow up with his cardiologist. Does need to continue Xarelto and prasugrel from our standpoint, instructed pt not to hold these medications for elective reasons in the next 6 weeks. Counseled pt that it is imperative for his vascular health to stop smoking.     For femoral arterial access:  Post-angioseal instructions: no lifting greater than 5 lbs and no baths/ swimming/ soaking in tub for 5 days, then resume activity as tolerated. Shower OK. OK to change dressings to band aid as needed.

## 2022-12-20 NOTE — PROGRESS NOTES
Assumed care of this patient at this time, Report received from ISAAC Connolly. Patient resting comfortably in bed at this time, Neurologically intact, reports no pain at this time.

## 2022-12-20 NOTE — DISCHARGE SUMMARY
Discharge Summary    CHIEF COMPLAINT ON ADMISSION  Dizziness    Reason for Admission  Occlusion and stenosis of right ve*     Admission Date  12/19/2022    CODE STATUS  Full Code    HPI & HOSPITAL COURSE  62 y.o. male who presented 12/19/2022 with a past medical history significant for diabetes, CAD, congestive heart failure and vascular disease who was undergone previous stenting of his intracerebral arteries who has persistent dizziness and was found to have flow-limiting stenosis of his vertebral artery.  He underwent an uncomplicated vertebral artery stent placement today with Dr. White and was brought to the intensive care unit postprocedure for close monitoring.  Patient has unchanged chronic dizziness postprocedure and no new complaints.    Post procedure day 1, patient had no acute issues and is ready to be discharged home.    Therefore, he is discharged in good and stable condition to home with close outpatient follow-up.    The patient recovered much more quickly than anticipated on admission.    Discharge Date  12/20/2022    FOLLOW UP ITEMS POST DISCHARGE  Patient to follow up in our clinic in 2-6 weeks, our office to arrange appointment. He prefers virtual appt. Next imaging follow up angiogram in 1 year unless symptoms return. Neurovascular anatomy questions answered. Discussed that near syncope could also be cardiogenic and the need for him to continue close follow up with his cardiologist. Does need to continue Xarelto and prasugrel from our standpoint, instructed pt not to hold these medications for elective reasons in the next 6 weeks. Counseled pt that it is imperative for his vascular health to stop smoking.      For femoral arterial access:  Post-angioseal instructions: no lifting greater than 5 lbs and no baths/ swimming/ soaking in tub for 5 days, then resume activity as tolerated. Shower OK. OK to change dressings to band aid as needed.    DISCHARGE DIAGNOSES  Principal Problem:     Stenosis of right vertebral artery POA: Unknown  Active Problems:    Type 2 diabetes mellitus (HCC) POA: Yes    CAD (coronary artery disease) POA: Yes      Overview: 2007 LAD 95%, followed by 70% stenoses. Stented both areas. Circumflex       80%, followed by 80%, both areas stented.    Dyslipidemia POA: Yes    Chronically on benzodiazepine therapy POA: Yes    Tobacco abuse POA: Unknown  Resolved Problems:    * No resolved hospital problems. *      FOLLOW UP    Shoaib White M.D.  [address] [facility] [id number] [phone]    Follow up in 2 week(s)        MEDICATIONS ON DISCHARGE     Medication List        CONTINUE taking these medications        Instructions   aspirin EC 81 MG Tbec  Commonly known as: ECOTRIN   Take 81 mg by mouth every day.  Dose: 81 mg     diazePAM 2 MG Tabs  Commonly known as: VALIUM   Take 2 mg by mouth 2 times a day.  Dose: 2 mg     insulin glargine 100 UNIT/ML Soln  Commonly known as: Lantus   Inject 20 Units under the skin every evening.  Dose: 20 Units     isosorbide mononitrate SR 30 MG Tb24  Commonly known as: IMDUR   Take 30 mg by mouth 1 time a day as needed (For chest pain).  Dose: 30 mg     nitroglycerin 0.4 MG Subl  Commonly known as: NITROSTAT   Place 0.4 mg under the tongue as needed for Chest Pain.  Dose: 0.4 mg     prasugrel 10 MG Tabs  Commonly known as: EFFIENT   Take 10 mg by mouth at bedtime.  Dose: 10 mg     rivaroxaban 2.5 MG tablet  Commonly known as: Xarelto   Take 2.5 mg by mouth every morning.  Dose: 2.5 mg     valsartan 40 MG Tabs  Commonly known as: DIOVAN   Take 40 mg by mouth every morning.  Dose: 40 mg     vitamin D3 5000 Unit (125 mcg) Tabs  Commonly known as: cholecalciferol   Take 5,000 Units by mouth every morning.  Dose: 5,000 Units     VITAMIN-B COMPLEX PO   Take 1 Tablet by mouth every morning.  Dose: 1 Tablet            STOP taking these medications      amoxicillin 500 MG Caps  Commonly known as: AMOXIL               Allergies  Allergies   Allergen Reactions    Ranexa [Ranolazine]      Dropped heart rate low and worsened lightheadedness.     Statins [Hmg-Coa-R Inhibitors]      Muscle aches.       DIET  Orders Placed This Encounter   Procedures    Diet Order Diet: Cardiac; Second Modifier: (optional): Consistent CHO (Diabetic)     Standing Status:   Standing     Number of Occurrences:   1     Order Specific Question:   Diet:     Answer:   Cardiac [6]     Order Specific Question:   Second Modifier: (optional)     Answer:   Consistent CHO (Diabetic) [4]       ACTIVITY  As tolerated.  Weight bearing as tolerated    CONSULTATIONS  Neuro IR  Medical intensivist    PROCEDURES  Right vertebral stent placement for right vertebral origin stenosis 12/19    LABORATORY  Lab Results   Component Value Date    SODIUM 140 12/20/2022    POTASSIUM 4.0 12/20/2022    CHLORIDE 107 12/20/2022    CO2 23 12/20/2022    GLUCOSE 79 12/20/2022    BUN 17 12/20/2022    CREATININE 1.05 12/20/2022    GLOMRATE 66 09/06/2022        Lab Results   Component Value Date    WBC 5.9 12/20/2022    HEMOGLOBIN 13.5 (L) 12/20/2022    HEMATOCRIT 40.5 (L) 12/20/2022    PLATELETCT 239 12/20/2022        Total time of the discharge process exceeds 37 minutes.

## 2022-12-20 NOTE — PROGRESS NOTES
4 Eyes Skin Assessment Completed by Dawit, RN and Po RN.    Head WDL  Ears WDL  Nose WDL  Mouth WDL  Neck WDL  Breast/Chest WDL  Shoulder Blades WDL  Spine WDL  (R) Arm/Elbow/Hand WDL  (L) Arm/Elbow/Hand WDL  Abdomen WDL  Groin right groin site bandage with serosang drainage  Scrotum/Coccyx/Buttocks Redness and Blanching  (R) Leg WDL  (L) Leg WDL  (R) Heel/Foot/Toe WDL  (L) Heel/Foot/Toe WDL          Devices In Places ECG, Blood Pressure Cuff, and Pulse Ox      Interventions In Place Low Air Loss Mattress    Possible Skin Injury No    Pictures Uploaded Into Epic N/A  Wound Consult Placed N/A  RN Wound Prevention Protocol Ordered No

## 2022-12-20 NOTE — CARE PLAN
"The patient is Stable - Low risk of patient condition declining or worsening    Shift Goals  Clinical Goals: SBP < 160, safety, monitor for bleeding from groin site  Patient Goals: \"go home\"    Progress made toward(s) clinical / shift goals: pain well controlled, groin site intact        Problem: Knowledge Deficit - Standard  Goal: Patient and family/care givers will demonstrate understanding of plan of care, disease process/condition, diagnostic tests and medications  Outcome: Not Met     Problem: Pain - Standard  Goal: Alleviation of pain or a reduction in pain to the patient’s comfort goal  Outcome: Not Met     "

## 2022-12-20 NOTE — PROGRESS NOTES
Discharge instructions explained to patient, signed, no questions asked. Pt taken to [address] entrance by wheel chair where wife picked him up in a car.     Gigi Romero RN

## 2022-12-27 NOTE — PROGRESS NOTES
"Pt contacted APRN regarding no improvement in his presenting symptoms since his right vert origin stent placement with Dr. White on 12/19. He is taking his blood thinning medications. Still has lightheadedness, describes a \"bad episode\" last night that woke him up, felt dizzy, generally weak, nauseous, and palpitations. Has been up trying to walk around and feels lightheaded, has to sit quickly. It is the same symptom constellation that has been present for 2 months that prompted him to contact our service for stent evaluation. He is worried he could develop stenosis or thrombosis in the stent. Pt also has significant coronary artery disease and cardiomyopathy.    After discussion, his symptoms are the same as prior to his recent stent placement and he is disappointed he did not get improvement. Discussed that dizziness/ lightheadedness is a symptom of VB insufficiency but could also be caused by other problems. Explained that in-stent thrombosis is possible and he should keep taking his blood thinners. Advised pt that if he is worried about stroke or complications due to an acute event to present to ED for workup given his significant vascular history. Provided pt the ANDREW DAWSON on call number.   "

## 2023-02-09 ENCOUNTER — HOSPITAL ENCOUNTER (OUTPATIENT)
Dept: RADIOLOGY | Facility: MEDICAL CENTER | Age: 63
End: 2023-02-09
Payer: MEDICARE

## 2023-05-08 NOTE — PROGRESS NOTES
Pt contacted APRN after presenting to ED at outside hospital. States he has severe stenosis in his aorta noted on CT chest (images and report unavailable in our EMR). Pt asking if Dr. White does aortic intervention.     Explained that while some IR physicians do aortic intervention, this IR group does not. Recommend pt follow up with his PCP to evaluate the location of concern and any progression, and refer to vascular or CT surgery as appropriate. There are vascular surgeons at both [facility] and he has seen Dr. Bynum in the past several years ago. He is understanding of the discussion and grateful for the return call.

## 2023-08-31 ENCOUNTER — HOSPITAL ENCOUNTER (OUTPATIENT)
Dept: RADIOLOGY | Facility: MEDICAL CENTER | Age: 63
End: 2023-08-31
Payer: MEDICARE

## 2023-12-04 NOTE — PROGRESS NOTES
Message forwarded by ANDREW  from pt, requesting return call. Call placed, no answer, left VM asking pt to return call or send MyChart communication.

## 2024-06-25 ENCOUNTER — HOSPITAL ENCOUNTER (OUTPATIENT)
Dept: RADIOLOGY | Facility: MEDICAL CENTER | Age: 64
End: 2024-06-25

## 2024-06-26 NOTE — PROGRESS NOTES
Telephone Appointment Visit    This telephone visit was initiated by the patient and they verbally consented. He was initially scheduled for a clinic appointment but request conversion to telephone follow up. The appointment was conducted by Shoaib White MD.    Reason for Call:  Hospital Follow-up and New Symptom/ Concern: dizziness    HPI:    Eb Poon was seen today in follow up after endovascular neurointervention performed by Shoaib White MD at [facility] on July 14, 2022. He was initially referred to our service by Topher Bynum MD. He is a 64 y.o. male established patient who recently contacted us regarding complaints of drop attacks and dizziness. He is also under the care of Claudia Alcocer MD and Michelle Peters MD.    History of Present Illness:   is well known to our practice. He has a history of polyvascular disease with interventions that include a left carotid endarterectomy, left subclavian stent, and bilateral iliac stents. He was initially referred to our practice in 2019 for left vertebral artery occlusion and right vertebral artery stenosis and drop attacks that first developed in 2014. Workup was performed to rule out Bowhunter's Syndrome. During the angiogram workup, he was noted to have an incidental 50-60% stenosis of the right ICA. He was admitted to [facility] in April and underwent stenting of the circumflex artery. Clinical notes indicate he was a potential Plavix nonresponder and was placed on ticagrelor and later switched to prasugrel. CTA to rule out TIA showed a progression of the right ICA stenosis to 90% and he was referred back for evaluation of this finding. He underwent an uncomplicated Right carotid artery stent placement on June 26, 2020 and discharged to home on June 27. A surveillance angiogram ordered in July 2021 and sent to his in-network hospital did not get performed. He contacted us in July 2022 with complaints of dizziness and  "drop attacks with a resulting ED encounter, and requested evaluation of his records and recent imaging by the [facility] service. There appeared to be progression in the RIGHT vertebral artery stenosis but fortunately no acute stroke was noted on MRI.  Additional clinical history includes ongoing tobacco dependence (smokes a pipe), ischemic cardiomyopathy, dyslipidemia, anxiety, and diabetes mellitus. He underwent stent placement for the symptomatic right vertebral artery stenosis on July 14. His recovery was unremarkable and he was discharged to home on July 15 with a 30 day prescription for Xarelto 10 mg, with instructions to decrease back to his maintenance dose of 2.5 mg daily once the course ended. He contacted us on July 26 with complaints of ongoing dizziness and weakness not improved since the procedure.     8/1/22: Today, he reports no improvement in his symptoms described in the phone encounter note from 7/26/22. He has persistent dizziness and poor quality of life due to this complaint. He also has symptoms when he stands up quickly, and states he is unable to stand greater than 15 minutes at a time. He is quite concerned about the patency of his previously treated arteries. He is taking Xarelto 10 mg, and has questions about symptom correlation with the temporarily increased dose. He again expresses that he understands the correlation with concurrent NSAIDs and Xarelto with aspirin but prefers to take ibuprofen for pain. He continues to smoke. He is accompanied by Dr. White for review of symptoms and consideration of stent placement.    11/4/22 phone call: Pt contacted IR requesting return call for \"weird symptoms.\"   Call back with Dr. White on speaker. Pt reports bilateral leg weakness, feeling like they give out. Also reports tremors accompanied by nausea. Feels like he has vision problems and generalized weakness when he gets out of bed. He is concerned a stent is occluding and is specifically " "concerned about VB insufficiency.      After discussion, it is unclear if his complaints are related to in stent stenosis causing VB insufficiency. Discussed other possibilities behind his symptoms including orthostatic hypotension, and he has a f/u with his cardiologist later today and will discuss. However, VB insufficiency cannot be ruled out by noninvasive testing. Pt is due for surveillance angiogram to check carotid and vertebral artery stents in January 2023. In light of his complaints, will accelerate the surveillance plan. Will arrange f/u angiogram in December.    12/27/22 phone call:   Pt contacted APRN regarding no improvement in his presenting symptoms since his right vert origin stent placement with Dr. White on 12/19. He is taking his blood thinning medications. Still has lightheadedness, describes a \"bad episode\" last night that woke him up, felt dizzy, generally weak, nauseous, and palpitations. Has been up trying to walk around and feels lightheaded, has to sit quickly. It is the same symptom constellation that has been present for 2 months that prompted him to contact our service for stent evaluation. He is worried he could develop stenosis or thrombosis in the stent. Pt also has significant coronary artery disease and cardiomyopathy.     After discussion, his symptoms are the same as prior to his recent stent placement and he is disappointed he did not get improvement. Discussed that dizziness/ lightheadedness is a symptom of VB insufficiency but could also be caused by other problems. Explained that in-stent thrombosis is possible and he should keep taking his blood thinners. Advised pt that if he is worried about stroke or complications due to an acute event to present to ED for workup given his significant vascular history. Provided pt the [facility] on call number.     5/8/23 phone call:  Pt contacted APRN after presenting to ED at outside hospital. States he has severe stenosis in his aorta " "noted on CT chest (images and report unavailable in our EMR). Pt asking if Dr. White does aortic intervention.      Explained that while some IR physicians do aortic intervention, this IR group does not. Recommend pt follow up with his PCP to evaluate the location of concern and any progression, and refer to vascular or CT surgery as appropriate. There are vascular surgeons at both [facility] and he has seen Dr. Bynum in the past several years ago. He is understanding of the discussion and grateful for the return call.      Interval history 6/26/24: Mr. Poon contacted [facility]  requesting outside imaging review by Dr. White. He was hospitalized for \"weird symptoms\" at [facility] and underwent extensive workup for cardiac and neurovascular etiologies. CTA demonstrates in-stent stenosis in both the right MONSERRAT and right vertebral artery stent. He presents today for review of symptoms and discussion of potential intervention. He is on triple therapy with Xarelto, clopidogrel, and aspirin. He is statin - intolerant due to myalgias and continues to smoke.       Labs / Images Reviewed:      Latest Reference Range & Units 06/24/24 18:17 06/25/24 05:00   RBC 4.50 - 5.70 x10e6/uL 4.26 (L) (E) 4.54 (E)   Hemoglobin 13.0 - 16.7 g/dL 13.1 (E) 13.9 (E)   Hematocrit 38.8 - 49.7 % 38.8 (E) 40.8 (E)   MCV 83.0 - 99.0 fL 91.1 (E) 89.8 (E)   MCH 28.0 - 33.8 pg 30.6 (E) 30.6 (E)   MCHC 33.1 - 36.5 g/dL 33.6 (E) 34.1 (E)   RDW 11.8 - 14.0 % 15.0 (H) (E) 14.8 (H) (E)   Platelet Count 146 - 390 x10E3/uL 220 (E) 208 (E)   MPV 6.4 - 10.2 fL 7.2 (E) 6.9 (E)   Neutrophils-Polys 41.7 - 82.3 % 64.8 (E) 52.5 (E)   Neutrophils (Absolute) 1.40 - 8.00 x10E3/uL 3.40 (E) 2.40 (E)   Lymphocytes 10.8 - 44.4 % 26.5 (E) 35.6 (E)   Lymphs (Absolute) 1.00 - 5.20 x10E3/uL 1.40 (E) 1.60 (E)   Monocytes 5.0 - 12.8 % 6.5 (E) 9.2 (E)   Monos (Absolute) 0.16 - 1.00 x10E3/uL 0.30 (E) 0.40 (E)   Eosinophils 0.0 - 6.6 % 1.6 (E) 2.2 (E)   Eos " (Absolute) 0.00 - 0.80 x10E3/uL 0.10 (E) 0.10 (E)   Basophils 0.0 - 1.3 % 0.6 (E) 0.5 (E)   Baso (Absolute) 0.00 - 0.30 x10E3/uL 0.00 (E) 0.00 (E)     Component 06/25/24 06/24/24 06/24/24 04/03/24 01/20/24 01/18/24   Sodium S/P 141 137 136 140 136 139   Potassium 4.1 4.7 4.4 3.5 Low  4.0 4.0   Chloride 112 High  106 105 107 105 107   Carbon Dioxide 23 19 Low  24 23 22 23   Alkaline Phosphatase 44 45 50 58 55 --   AST 11 Low  18 15 12 Low  12 Low  --   ALT 8 Low  8 Low  9 Low  11 Low  11 Low  --   BUN 25 High  21 High  22 High  26 High  20 21 High    Creatinine S/P 1.20 1.24 1.26 1.07 1.03 1.15   Calcium 9.4 9.4 9.6 10.0 9.0 9.8   Protein, Total, S/P 6.7 6.7 7.1 7.8 7.1 --   Albumin 4.1 4.1 4.3 4.4 4.1 --   A/G Ratio 1.6 1.6 1.5 1.3 1.4 --   Glomerular Filtration Rate (GFR) 61  58 Low   57 Low   69  72  64    Globulins, Total 2.6 2.6 2.8 3.4 High  3.0 --   Glucose S/P 67 87 133 High  136 High  191 High  126 High    Bilirubin, Total 0.4 0.4 0.4 0.4 0.7 --   Anion Gap 6 12 High  7 10 9 9        Component  Ref Range & Units 1 d ago Comments   Prothrombin Time  11.7 - 14.4 seconds 14.9 High     INR  0.9 - 1.1 ratio 1.1 The INR (International Normalized Ratio) is used only for patients using stable oral anticoagulant therapy, and does not contribute to the diagnosis or management of other coagulation disorders. The recommended therapeutic range for standard therapy is 2.0 - 3.0.  Higher level anticoagulation requires a range of 2.5 - 3.5   Resulting Agency [facility]    Specimen Collected: 06/24/24  6:17 PM    Performed by: [facility] Last Resulted: 06/24/24  6:46 PM       Radiology:   CTA neck 6/15/24 at [facility]:  1.  Severe atherosclerotic disease overall.   2.  Patent left subclavian artery stent.   3.  Diminutive left vertebral artery.   4.  Stent present in the proximal right vertebral artery. Approximately 50   percent stenosis just cephalad  to the stent.   5.  Right carotid artery stent with moderate to severe in stent restenosis and   moderate stenosis extending at the distal end of the stent cranially.   6.  Severely diseased upper right internal carotid artery as described above.   Likely multiple high-grade stenoses/occlusions present.   7.  Approximately 50 percent stenosis of the proximal common carotid artery and   internal carotid artery on the left.         CTA head 6/15/24 at [facility]:  1.  No evidence for significant intracranial arterial stenosis or occlusion.     CTA LE 1/20/24 at [facility]:  1.  No evidence of hemodynamically significant stenosis or occlusion involving   the left lower extremity arterial vasculature with at least 2 vessels crossing   the ankle joint.   2.  Less than 50% stenosis of the left common femoral artery secondary to   atherosclerotic plaque. Abnormality seen on prior ultrasound likely secondary to   vascular stent and mild atherosclerotic disease, not seen by ultrasound.   3.  Left popliteal/superficial femoral and left external iliac vascular stents   with partial visualization of right external iliac vascular stent.   4.  Atherosclerotic disease of the left lower extremity arteries.     LE angiogram 1/18/24 at [facility]:  Intervention   Prox L CFA to Mid L CFA lesion: Angioplasty: Angioplasty independent of stent deployment was performed using a standard balloon. The balloon used was MUSTANG. Maximum pressure: 14 miles. Inflation time: 60 sec. Atherectomy: Catheter used was SHOCKWAVE M5+. Passes taken: 10. s/p lithotripsy of the left external illiac for stent restenosis (site of previous stent) with Shockwave 6x60 lithotripsy balloon. s/p angioplasty of the left external illiac. Angioplasty: Angioplasty independent of stent deployment was performed using a standard balloon. The balloon used was MUSTANG 2. Maximum pressure: 60 miles. Inflation time: 10 sec. Post-Intervention Lesion Assessment: The intervention was successful. There  were no complications. Small dissection but minimal. There is a 0% residual stenosis post intervention.     Interventional cardiology procedure 12/6/23 at [facility]:  Intervention   Ost Cx to Prox Cx lesion: Atherectomy: Supplies Used: [employer] C2 IVL CATHETER 2.5 X12 MM Angioplasty: Angioplasty using a standard balloon was performed following stent deployment. The balloon used was a BALLOON EMERGE 2.75 X 15. Maximum pressure: 16 miles. Inflation time: 10 sec. Post-Intervention Lesion Assessment: The intervention was successful. Post-intervention CECILLE flow is 3. There were no complications. There is a 0% residual stenosis post intervention.   Prox RCA lesion: Angioplasty: Angioplasty using a standard balloon was performed prior to stent deployment. The balloon used was a BALLOON EMERGE 2.00 X 15. Maximum pressure: 14 miles. Inflation time: 10 sec. Stent: Drug-eluting stent was successfully placed. The stent used was a SYNERGY XD MR US [id number]. Stent was deployed by way of balloon expansion. Maximum pressure: 10 miles. Inflation time: 10 sec. Post-Intervention Lesion Assessment: The intervention was successful. Post-intervention CECILLE flow is 3. There were no complications. There is a 0% residual stenosis post intervention.     Intervention   Dist R EIA to Prox R CFA lesion: Angioplasty: Angioplasty independent of stent deployment was performed using a standard balloon. The balloon used was [place] 6x20. Maximum pressure: 16 miles. Inflation time: 60 sec. Post-Intervention Lesion Assessment: The intervention was successful. There were no complications. There is a 20% residual stenosis post intervention.     Interventional EP procedure 9/22/24 at [facility]:  Summary:   Successful [employer] Dual Chamber Defibrillator implant   Right ventricular stimulation study positive for sustained monomorphic   ventricular tachycardia   Loop recorder explant   Defibrillation testing     CTA head 5/1/23 at [facility]:  1. Moderate atherosclerotic  disease noted. Postsurgical changes of the carotid   bilaterally with patent right sided proximal internal carotid artery stent.     CTA neck 5/1/23 at [facility]:  1. Moderate atherosclerotic disease noted. Postsurgical changes of the carotid   bilaterally with patent right sided proximal internal carotid artery stent.     TTE 2/9/23 at [facility]:  Interpretation Summary   Left ventricular systolic function is moderately reduced. The Ejection Fraction estimate is 35-40%   Basal inferoseptal, basal inferior, basal and mid anterior hypokinesia noted.     The right ventricular systolic function is normal.     The IVC was normal in size at < 23 mm and collapsed >50% with respiration (RAP 3 mmHg).   There is no pericardial effusion.       CTA neck 2/8/23 at [facility]:  No acute intracranial abnormalities on the noncontrast head CT.     No large vessel occlusion of the major arteries of the head and neck.     Calcific atherosclerosis of the right petrous and cavernous ICAs causing   stenosis, unchanged     Interval placements of a right proximal vertebral artery stent and a right   distal vertebral artery stent which appear patent.     Unchanged chronic occlusion of the left vertebral artery.     Right common carotid through internal carotid artery stent is patent without   occlusion.     Prior left internal carotid endarterectomy without stenosis or occlusion.     Left subclavian artery stent is also patent without occlusion.     Stenoses reported are derived by comparing the narrowest segment with the more   distal luminal diameter.     CTA head 2/8/23 at [facility]:  No acute intracranial abnormalities on the noncontrast head CT.     No large vessel occlusion of the major arteries of the head and neck.     Calcific atherosclerosis of the right petrous and cavernous ICAs causing   stenosis, unchanged     Interval placements of a right proximal vertebral artery stent and a right   distal vertebral artery stent which appear patent.     Unchanged  chronic occlusion of the left vertebral artery.     Right common carotid through internal carotid artery stent is patent without   occlusion.     Prior left internal carotid endarterectomy without stenosis or occlusion.     Left subclavian artery stent is also patent without occlusion.     Stenoses reported are derived by comparing the narrowest segment with the more   distal luminal diameter.     Neurointerventional Radiology Procedure 12/19/22 at [facility]:  Addendum:  The degree of carotid stenosis is calculated using NASCET criteria.  IMPRESSION:     1.  Severe stenosis at the origin of the single dominant right vertebral artery.  2.  Successful stent placement.  3.  Widely patent previously placed right internal carotid, right distal cervical, right subclavian and iliac stents.  4.  Diffuse intracranial atherosclerotic disease without significant stenosis.      Neurointerventional procedure 7/14/2022 at [facility]:  1.  Moderate to severe symptomatic right cervical vertebral stenosis.  2.  Successful stent placement.  3.  Patent right carotid stent.  4.  Moderate diffuse atherosclerotic disease in the distal cervical and petrous portion of the right internal carotid artery.  5.  There is no significant stenosis at the origin of the left carotid artery.  6.  Mild atherosclerotic disease in the petrous portion of the left internal carotid artery.  7.  There is no significant renal artery stenosis.  8.  Bilateral external and internal iliac stents are widely patent.            MRI brain 6/10/22 at [facility]:  1.  No acute intracranial abnormality.   2.  Age-related atrophy and a small amount of microvascular white matter   gliosis.   3.  Punctate metallic foreign body within the upper eyelid on the left.      CTA head/neck 1/12/22 at [facility]:  Calcific atherosclerosis of the right petrous and cavernous ICAs, stable.     Unchanged chronic occlusion of the distal left vertebral artery.     Right common carotid  through internal carotid artery stent is patent without   occlusion.     Prior left internal carotid endarterectomy without stenosis or occlusion.     Left subclavian artery stent is also patent without occlusion.     Stenoses reported are derived by comparing the narrowest segment with the more   distal luminal diameter.       CTA head and neck July 1, 2020 at [facility]:    CTA head and neck  April 24, 2020 at [facility]:       Endovascular neurointervention June 26, 2020 at [facility]:  1.  An approximately 70-80% stenosis in the proximal portion of the right internal carotid artery. Successful right internal carotid artery stent placement without any immediate convocation.  2.  An approximately 50% fixed stenosis in the right vertebral artery at the level of C2. The stenosis is not changed in right lateral and left lateral positions.  3.  Moderate stenosis in the right internal carotid artery at the skull base and petrous portion.  4.  No significant stenosis at the origin of the left internal carotid artery.  5.  Widely patent left subclavian stent.  6.  Widely patent bilateral iliac stents.            Physician directed carotid/vertebral ultrasound July 31, 2019 at [facility]:  1.  There is antegrade flow in the dominant right vertebral artery. There is significant reduction in the peak systolic velocity during neck extension, right lateral, left lateral and flexion position when compared with the neutral position. The catheter   angiogram done on 10/15/2019 demonstrates an approximately 70% stenosis at the level of C1-C2 during left lateral position. 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     2. Increased peak systolic velocity of right internal carotid artery with atherosclerotic plaque consistent with 50-70% stenosis. This is in keeping with the diagnosis of 60% stenosis in the proximal right internal  carotid artery on the recent catheter   Angiogram.     Catheter angiogram on July 15, 2019 at [facility]:  1.  There is focal 70% stenosis at the single dominant right vertebral artery at the level of C2 during left lateral position of the neck. The stenosis is only visualized on the left lateral position of the neck. This finding is concerning for positional   stenosis of the vertebral artery. However there is antegrade flow seen during left lateral position. There is no occlusion.  2.  An approximately 50-60% stenosis in the proximal right internal artery.  3.  50% stenosis in the distal cervical segment of the right internal carotid artery.  4.  Irregular contour of the petrous portion of the right internal carotid artery. The differential diagnosis includes atherosclerotic disease and fibromuscular dysplasia.  5.  An approximately 70% stenosis at the midportion of the proximal left subclavian artery.  6.  Nonvisualization of the right vertebral artery.  7.  Patent bilateral common and external iliac stents.        Assessment and Plan:   Impression:   1. Right vertebral artery stenosis with history of stent placement, on Xarelto, prasugrel, and aspirin.  2. Left vertebral artery chronic occlusion.   3. Right internal carotid artery in stent stenosis.  4. Left carotid artery stenosis, status post endarterectomy.  5. Left subclavian artery stenosis, status post stent placement, widely patent on CTA.  6. Bilateral iliac stenosis, status post stent placements.  7.  Coronary artery disease.  8.  Peripheral vascular disease.  9.  Diabetes mellitus.  10. Hypertension.  11. History of myocardial infarction.  12. Tobacco dependence.    Plan:   Shoaib White MD conducted the appointment today.   Mr. Poon has severe in stent stenosis of the right carotid artery and likely also has new stenosis of the right vertebral artery. These should be further evaluated with conventional angiography and intervention if indicated.  We discussed the method of the procedure at length including the use of catheters, contrast, and xrays to facilitate diagnostic procedures and on and off label use of stents and embolic agents to perform endovascular intervention. We additionally discussed the procedure risks, including bleeding and infection, damage to the arteries, reaction to any medications given during the procedure, side effects of contrast, radiation exposure, in stent stenosis, coil or stent migration, mechanical failure, stroke, hemorrhage, and death. There is a risk for unanticipated neurologic or non neurologic complications. Specific to carotid artery stent placement, there is a risk for hemodynamic instability post procedure that could require extended care in the ICU to manage blood pressure and heart rate. There is a chance the stenoses may ultimately not be amenable to endovascular intervention. After the procedure, there is a chance that the stenosis could recur especially in light of his continued smoking. We reviewed known risk factors for vascular health that include hypertension, hyperglycemia, hyperlipidemia, and tobacco use, and modifiable risk factors were discussed with an emphasis on complete tobacco cessation. We explained that the patient will need to have ongoing surveillance imaging after the procedure to monitor for recurrence of the condition, which will be managed by us, and that future treatment depends on multiple factors including lab studies, imaging, and performance status. We discussed alternatives to the procedure including surveillance and continued medical management, however the patient reached out to us specifically for potential intervention and verbalizes understanding of risks, benefits, and alternatives and elects to proceed. After intervention, he should not electively hold prasugrel and aspirin for 3 months. The patient is pending scheduling.     Dada Poon is a 64 year old male scheduled for  carotid artery and vertebral artery intervention. This surgery will be higher risk surgery due to risk of intracranial hemorrhage or thrombotic events. Care provided will be in the Intensive Care Unit. Post procedurally, this patient will require every 1 hour nursing interventions and physician evaluations more than 3-4 times a day. The care needed cannot be provided in outpatient setting and without such care there is high risk of development of complications and death.     Follow-up: Continue aspirin and prasugrel. Schedule intervention for right carotid artery stenosis and right vertebral artery stenosis as noted on CTA.    Total CALL Time Spent (mins): 11 minutes    CYNTHIA OdellP.N.

## 2024-06-27 ENCOUNTER — HOSPITAL ENCOUNTER (OUTPATIENT)
Dept: RADIOLOGY | Facility: MEDICAL CENTER | Age: 64
End: 2024-06-27
Attending: NURSE PRACTITIONER
Payer: MEDICARE

## 2024-06-27 DIAGNOSIS — I65.21 CAROTID STENOSIS, RIGHT: ICD-10-CM

## 2024-08-05 ENCOUNTER — APPOINTMENT (OUTPATIENT)
Dept: RADIOLOGY | Facility: MEDICAL CENTER | Age: 64
End: 2024-08-05
Attending: RADIOLOGY
Payer: MEDICARE

## 2024-08-15 ENCOUNTER — APPOINTMENT (OUTPATIENT)
Dept: ADMISSIONS | Facility: MEDICAL CENTER | Age: 64
End: 2024-08-15
Attending: RADIOLOGY
Payer: MEDICARE

## 2024-08-20 ENCOUNTER — PRE-ADMISSION TESTING (OUTPATIENT)
Dept: ADMISSIONS | Facility: MEDICAL CENTER | Age: 64
End: 2024-08-20
Attending: RADIOLOGY
Payer: MEDICARE

## 2024-08-20 RX ORDER — ONDANSETRON 4 MG/1
4 TABLET, ORALLY DISINTEGRATING ORAL EVERY 6 HOURS PRN
COMMUNITY

## 2024-08-20 RX ORDER — SACUBITRIL AND VALSARTAN 49; 51 MG/1; MG/1
0.5 TABLET, FILM COATED ORAL DAILY
COMMUNITY
Start: 2024-05-15 | End: 2024-09-12

## 2024-08-20 NOTE — OR NURSING
Fasting instructions given to pt per pre procedure orders. Pt following instructions on all  blood thinners per MD.

## 2024-08-29 ENCOUNTER — APPOINTMENT (OUTPATIENT)
Dept: RADIOLOGY | Facility: MEDICAL CENTER | Age: 64
End: 2024-08-29
Attending: RADIOLOGY
Payer: MEDICARE

## 2024-08-29 ENCOUNTER — HOSPITAL ENCOUNTER (INPATIENT)
Facility: MEDICAL CENTER | Age: 64
LOS: 1 days | End: 2024-08-29
Attending: RADIOLOGY | Admitting: RADIOLOGY
Payer: MEDICARE

## 2024-08-29 VITALS
HEIGHT: 68 IN | WEIGHT: 162.04 LBS | DIASTOLIC BLOOD PRESSURE: 75 MMHG | SYSTOLIC BLOOD PRESSURE: 170 MMHG | BODY MASS INDEX: 24.56 KG/M2 | RESPIRATION RATE: 22 BRPM | HEART RATE: 85 BPM | TEMPERATURE: 97.9 F | OXYGEN SATURATION: 99 %

## 2024-08-29 DIAGNOSIS — I65.01 STENOSIS OF RIGHT VERTEBRAL ARTERY: ICD-10-CM

## 2024-08-29 DIAGNOSIS — I65.21 CAROTID STENOSIS, RIGHT: ICD-10-CM

## 2024-08-29 LAB
ANION GAP SERPL CALC-SCNC: 11 MMOL/L (ref 7–16)
BUN BLD-MCNC: 22 MG/DL (ref 8–22)
BUN SERPL-MCNC: 20 MG/DL (ref 8–22)
CA-I BLD ISE-SCNC: 1.28 MMOL/L (ref 1.1–1.3)
CALCIUM SERPL-MCNC: 9.9 MG/DL (ref 8.5–10.5)
CFT BLD TEG: 7.4 MIN (ref 4.6–9.1)
CFT P HPASE BLD TEG: 8.6 MIN (ref 4.3–8.3)
CHLORIDE BLD-SCNC: 108 MMOL/L (ref 96–112)
CHLORIDE SERPL-SCNC: 108 MMOL/L (ref 96–112)
CLOT ANGLE BLD TEG: 73.8 DEGREES (ref 63–78)
CO2 BLD-SCNC: 23 MMOL/L (ref 20–33)
CO2 SERPL-SCNC: 21 MMOL/L (ref 20–33)
CREAT BLD-MCNC: 1 MG/DL (ref 0.5–1.4)
CREAT SERPL-MCNC: 0.93 MG/DL (ref 0.5–1.4)
CT.EXTRINSIC BLD ROTEM: 1.3 MIN (ref 0.8–2.1)
EKG IMPRESSION: NORMAL
ERYTHROCYTE [DISTWIDTH] IN BLOOD BY AUTOMATED COUNT: 48.5 FL (ref 35.9–50)
GFR SERPLBLD CREATININE-BSD FMLA CKD-EPI: 90 ML/MIN/1.73 M 2
GLUCOSE BLD STRIP.AUTO-MCNC: 122 MG/DL (ref 65–99)
GLUCOSE BLD STRIP.AUTO-MCNC: 189 MG/DL (ref 65–99)
GLUCOSE BLD STRIP.AUTO-MCNC: 69 MG/DL (ref 65–99)
GLUCOSE BLD STRIP.AUTO-MCNC: 86 MG/DL (ref 65–99)
GLUCOSE BLD STRIP.AUTO-MCNC: 95 MG/DL (ref 65–99)
GLUCOSE BLD-MCNC: 85 MG/DL (ref 65–99)
GLUCOSE SERPL-MCNC: 87 MG/DL (ref 65–99)
HCT VFR BLD AUTO: 44.6 % (ref 42–52)
HGB BLD-MCNC: 14.5 G/DL (ref 14–18)
INR PPP: 1.08 (ref 0.87–1.13)
MCF BLD TEG: 58.8 MM (ref 52–69)
MCF.PLATELET INHIB BLD ROTEM: 22.2 MM (ref 15–32)
MCH RBC QN AUTO: 29.5 PG (ref 27–33)
MCHC RBC AUTO-ENTMCNC: 32.5 G/DL (ref 32.3–36.5)
MCV RBC AUTO: 90.8 FL (ref 81.4–97.8)
PA AA BLD-ACNC: 91.8 % (ref 0–11)
PA ADP BLD-ACNC: 38.3 % (ref 0–17)
PLATELET # BLD AUTO: 207 K/UL (ref 164–446)
PMV BLD AUTO: 8.8 FL (ref 9–12.9)
POTASSIUM BLD-SCNC: 4.1 MMOL/L (ref 3.6–5.5)
POTASSIUM SERPL-SCNC: 4.1 MMOL/L (ref 3.6–5.5)
PROTHROMBIN TIME: 14.1 SEC (ref 12–14.6)
RBC # BLD AUTO: 4.91 M/UL (ref 4.7–6.1)
SODIUM BLD-SCNC: 145 MMOL/L (ref 135–145)
SODIUM SERPL-SCNC: 140 MMOL/L (ref 135–145)
TEG ALGORITHM TGALG: ABNORMAL
TROPONIN T SERPL-MCNC: 11 NG/L (ref 6–19)
WBC # BLD AUTO: 4.3 K/UL (ref 4.8–10.8)

## 2024-08-29 PROCEDURE — B3151ZZ FLUOROSCOPY OF BILATERAL COMMON CAROTID ARTERIES USING LOW OSMOLAR CONTRAST: ICD-10-PCS | Performed by: RADIOLOGY

## 2024-08-29 PROCEDURE — 85576 BLOOD PLATELET AGGREGATION: CPT | Mod: 91

## 2024-08-29 PROCEDURE — 36415 COLL VENOUS BLD VENIPUNCTURE: CPT

## 2024-08-29 PROCEDURE — B3111ZZ FLUOROSCOPY OF RIGHT BRACHIOCEPHALIC-SUBCLAVIAN ARTERY USING LOW OSMOLAR CONTRAST: ICD-10-PCS | Performed by: RADIOLOGY

## 2024-08-29 PROCEDURE — 80047 BASIC METABLC PNL IONIZED CA: CPT

## 2024-08-29 PROCEDURE — 85610 PROTHROMBIN TIME: CPT

## 2024-08-29 PROCEDURE — B3121ZZ FLUOROSCOPY OF LEFT SUBCLAVIAN ARTERY USING LOW OSMOLAR CONTRAST: ICD-10-PCS | Performed by: RADIOLOGY

## 2024-08-29 PROCEDURE — 99153 MOD SED SAME PHYS/QHP EA: CPT

## 2024-08-29 PROCEDURE — 160035 HCHG PACU - 1ST 60 MINS PHASE I

## 2024-08-29 PROCEDURE — 82962 GLUCOSE BLOOD TEST: CPT

## 2024-08-29 PROCEDURE — 84484 ASSAY OF TROPONIN QUANT: CPT

## 2024-08-29 PROCEDURE — 85384 FIBRINOGEN ACTIVITY: CPT | Mod: 91

## 2024-08-29 PROCEDURE — 85027 COMPLETE CBC AUTOMATED: CPT

## 2024-08-29 PROCEDURE — A9270 NON-COVERED ITEM OR SERVICE: HCPCS | Performed by: RADIOLOGY

## 2024-08-29 PROCEDURE — 80048 BASIC METABOLIC PNL TOTAL CA: CPT

## 2024-08-29 PROCEDURE — 700111 HCHG RX REV CODE 636 W/ 250 OVERRIDE (IP): Mod: JZ | Performed by: RADIOLOGY

## 2024-08-29 PROCEDURE — 700111 HCHG RX REV CODE 636 W/ 250 OVERRIDE (IP): Mod: JZ | Performed by: INTERNAL MEDICINE

## 2024-08-29 PROCEDURE — 700105 HCHG RX REV CODE 258: Performed by: RADIOLOGY

## 2024-08-29 PROCEDURE — 306637 HCHG MISC ORTHO ITEM RC 0274

## 2024-08-29 PROCEDURE — 85347 COAGULATION TIME ACTIVATED: CPT

## 2024-08-29 PROCEDURE — 700101 HCHG RX REV CODE 250

## 2024-08-29 PROCEDURE — 160036 HCHG PACU - EA ADDL 30 MINS PHASE I

## 2024-08-29 PROCEDURE — 93010 ELECTROCARDIOGRAM REPORT: CPT | Performed by: INTERNAL MEDICINE

## 2024-08-29 PROCEDURE — 700111 HCHG RX REV CODE 636 W/ 250 OVERRIDE (IP): Mod: JZ

## 2024-08-29 PROCEDURE — 700117 HCHG RX CONTRAST REV CODE 255: Performed by: RADIOLOGY

## 2024-08-29 PROCEDURE — 160046 HCHG PACU - 1ST 60 MINS PHASE II

## 2024-08-29 PROCEDURE — 160002 HCHG RECOVERY MINUTES (STAT)

## 2024-08-29 PROCEDURE — 99223 1ST HOSP IP/OBS HIGH 75: CPT | Performed by: INTERNAL MEDICINE

## 2024-08-29 PROCEDURE — 71045 X-RAY EXAM CHEST 1 VIEW: CPT

## 2024-08-29 PROCEDURE — 93005 ELECTROCARDIOGRAM TRACING: CPT | Performed by: RADIOLOGY

## 2024-08-29 PROCEDURE — 700102 HCHG RX REV CODE 250 W/ 637 OVERRIDE(OP): Performed by: RADIOLOGY

## 2024-08-29 RX ORDER — MIDAZOLAM HYDROCHLORIDE 1 MG/ML
.5-2 INJECTION INTRAMUSCULAR; INTRAVENOUS PRN
Status: ACTIVE | OUTPATIENT
Start: 2024-08-29 | End: 2024-08-29

## 2024-08-29 RX ORDER — SODIUM CHLORIDE 9 MG/ML
500 INJECTION, SOLUTION INTRAVENOUS
Status: DISPENSED | OUTPATIENT
Start: 2024-08-29 | End: 2024-08-29

## 2024-08-29 RX ORDER — OXYCODONE HYDROCHLORIDE 5 MG/1
2.5 TABLET ORAL
Status: DISCONTINUED | OUTPATIENT
Start: 2024-08-29 | End: 2024-08-29 | Stop reason: HOSPADM

## 2024-08-29 RX ORDER — NITROGLYCERIN 0.4 MG/1
0.4 TABLET SUBLINGUAL ONCE
Status: COMPLETED | OUTPATIENT
Start: 2024-08-29 | End: 2024-08-29

## 2024-08-29 RX ORDER — SODIUM CHLORIDE 9 MG/ML
1000 INJECTION, SOLUTION INTRAVENOUS CONTINUOUS
Status: DISCONTINUED | OUTPATIENT
Start: 2024-08-29 | End: 2024-08-29 | Stop reason: HOSPADM

## 2024-08-29 RX ORDER — HYDROMORPHONE HYDROCHLORIDE 1 MG/ML
0.25 INJECTION, SOLUTION INTRAMUSCULAR; INTRAVENOUS; SUBCUTANEOUS
Status: DISCONTINUED | OUTPATIENT
Start: 2024-08-29 | End: 2024-08-29 | Stop reason: HOSPADM

## 2024-08-29 RX ORDER — MIDAZOLAM HYDROCHLORIDE 1 MG/ML
INJECTION INTRAMUSCULAR; INTRAVENOUS
Status: COMPLETED
Start: 2024-08-29 | End: 2024-08-29

## 2024-08-29 RX ORDER — HYDRALAZINE HYDROCHLORIDE 20 MG/ML
20 INJECTION INTRAMUSCULAR; INTRAVENOUS ONCE
Status: COMPLETED | OUTPATIENT
Start: 2024-08-29 | End: 2024-08-29

## 2024-08-29 RX ORDER — DEXTROSE MONOHYDRATE 25 G/50ML
INJECTION, SOLUTION INTRAVENOUS
Status: COMPLETED
Start: 2024-08-29 | End: 2024-08-29

## 2024-08-29 RX ORDER — PHENYLEPHRINE HCL IN 0.9% NACL 1 MG/10 ML
SYRINGE (ML) INTRAVENOUS
Status: DISCONTINUED
Start: 2024-08-29 | End: 2024-08-29 | Stop reason: HOSPADM

## 2024-08-29 RX ORDER — ALBUTEROL SULFATE 90 UG/1
2 INHALANT RESPIRATORY (INHALATION) EVERY 6 HOURS PRN
COMMUNITY

## 2024-08-29 RX ORDER — DEXTROSE MONOHYDRATE 25 G/50ML
25 INJECTION, SOLUTION INTRAVENOUS
Status: DISCONTINUED | OUTPATIENT
Start: 2024-08-29 | End: 2024-08-29 | Stop reason: HOSPADM

## 2024-08-29 RX ORDER — OXYCODONE HYDROCHLORIDE 5 MG/1
5 TABLET ORAL
Status: DISCONTINUED | OUTPATIENT
Start: 2024-08-29 | End: 2024-08-29 | Stop reason: HOSPADM

## 2024-08-29 RX ORDER — ONDANSETRON 2 MG/ML
4 INJECTION INTRAMUSCULAR; INTRAVENOUS PRN
Status: ACTIVE | OUTPATIENT
Start: 2024-08-29 | End: 2024-08-29

## 2024-08-29 RX ADMIN — SODIUM CHLORIDE 1000 ML: 9 INJECTION, SOLUTION INTRAVENOUS at 09:31

## 2024-08-29 RX ADMIN — DEXTROSE MONOHYDRATE 25 G: 25 INJECTION, SOLUTION INTRAVENOUS at 15:07

## 2024-08-29 RX ADMIN — MIDAZOLAM HYDROCHLORIDE 1 MG: 1 INJECTION, SOLUTION INTRAMUSCULAR; INTRAVENOUS at 15:32

## 2024-08-29 RX ADMIN — MIDAZOLAM HYDROCHLORIDE 1 MG: 1 INJECTION, SOLUTION INTRAMUSCULAR; INTRAVENOUS at 15:42

## 2024-08-29 RX ADMIN — FENTANYL CITRATE 25 MCG: 50 INJECTION, SOLUTION INTRAMUSCULAR; INTRAVENOUS at 15:32

## 2024-08-29 RX ADMIN — HYDRALAZINE HYDROCHLORIDE 20 MG: 20 INJECTION INTRAMUSCULAR; INTRAVENOUS at 10:30

## 2024-08-29 RX ADMIN — FENTANYL CITRATE 50 MCG: 50 INJECTION, SOLUTION INTRAMUSCULAR; INTRAVENOUS at 16:11

## 2024-08-29 RX ADMIN — MIDAZOLAM HYDROCHLORIDE 1 MG: 1 INJECTION, SOLUTION INTRAMUSCULAR; INTRAVENOUS at 16:11

## 2024-08-29 RX ADMIN — NITROGLYCERIN 0.4 MG: 0.4 TABLET, ORALLY DISINTEGRATING SUBLINGUAL at 09:56

## 2024-08-29 RX ADMIN — FENTANYL CITRATE 25 MCG: 50 INJECTION, SOLUTION INTRAMUSCULAR; INTRAVENOUS at 15:37

## 2024-08-29 RX ADMIN — IOHEXOL 140 ML: 300 INJECTION, SOLUTION INTRAVENOUS at 17:00

## 2024-08-29 RX ADMIN — MIDAZOLAM HYDROCHLORIDE 1 MG: 1 INJECTION, SOLUTION INTRAMUSCULAR; INTRAVENOUS at 15:37

## 2024-08-29 ASSESSMENT — FIBROSIS 4 INDEX: FIB4 SCORE: 0.81

## 2024-08-29 NOTE — PROGRESS NOTES
Medication history reviewed with PT at bedside    [facility] is complete per PT reporting    Allergies reviewed.     Patient denies any outpatient antibiotics in the last 30 days.     Patient is takingPrasugrel 10mg. Last dose was 08/28/2024 in the Afternoon.  PT is taking Xarelto 2.5mg. Last dose was 08/28/2024 in the PM.    Preferred pharmacy for this visit - [facility] in [place] ([phone])

## 2024-08-29 NOTE — OR NURSING
Pt's blood sugar 69 this pm. [facility] IR nurse notified Dr. White. Will manage pt's blood sugar in the IR. Pt left for his procedure.

## 2024-08-29 NOTE — CONSULTS
Cardiology Initial Consult Note    Reason for Consult:  Asked by Dr Shoaib White M.D. to see this patient with chest pain  Patient's PCP: Pcp Pt States None    CC: No chief complaint on file.      HPI:   This is a 64-year-old man with extensive cardiac history including severe, multivessel CAD status post multiple PCI's complicated by in-stent restenosis, peripheral arterial disease status post peripheral interventions including stenting of the left innominate arery, revascularization of the SFA and femoral artery with atherectomy, lithotripsy and angioplasty of left external iliac artery, history of ischemic cardiomyopathy, HFrEF EF 35 to 40%, history of NSVT status post AICD, hypertension, hyperlipidemia with statin intolerance, diabetes mellitus who presents to the hospital for outpatient carotid artery intervention.    In the preop room, patient developed left-sided chest pain in the setting of markedly elevated blood pressure with SBP of 220 mmHg.  A rapid response was called due to symptoms and elevated blood pressure.  He was given medication for blood pressure with improvement down to 190 mmHg.  EKG was checked which shows sinus rhythm and no ischemic changes.  Labs including troponins were also checked which showed normal troponins.  Cardiology was consulted for further evaluation.    Evaluation at bedside with the rapid response team, patient states that he feels much improved.  No longer with chest pain.  Blood pressure currently ranging from 180-190 systolic.    Of note, he is known to Dr. Tejas Rushing from the [facility] group who has been managing his underlying cardiac conditions.  He recently underwent cardiac catheterization with intervention to the LAD on 7/29/2024. Found to have significant LAD disease 85% stenosis--mid to distal LAD ISR. Had laser atherectomy and cutting balloon with IVUS guided DCB of LAD. RCA and LCX stents remain patent and coronary anatomy similar to prior cardiac cath  "in Dec 2023.      Medications / Drug list prior to admission:  No current facility-administered medications on file prior to encounter.     Current Outpatient Medications on File Prior to Encounter   Medication Sig Dispense Refill    albuterol 108 (90 Base) MCG/ACT Aero Soln inhalation aerosol Inhale 2 Puffs every 6 hours as needed for Shortness of Breath.      Glycerin-Hypromellose- (DRY EYE RELIEF DROPS OP) Administer 1 Drop into affected eye(s) 4 times a day as needed (dry eyes).      insulin glargine 100 UNIT/ML SC SOLN Inject 20 Units under the skin every evening.      diazePAM (VALIUM) 2 MG Tab Take 4 mg by mouth every day. 2 tablets = 4mg      aspirin EC (ECOTRIN) 81 MG Tablet Delayed Response Take 81 mg by mouth every day.      prasugrel (EFFIENT) 10 MG Tab Take 10 mg by mouth every day.      rivaroxaban (XARELTO) 2.5 MG tablet Take 2.5 mg by mouth 2 times a day.      nitroglycerin (NITROSTAT) 0.4 MG SL Tab Place 0.4 mg under the tongue as needed for Chest Pain.      vitamin D3 (CHOLECALCIFEROL) 5000 Unit (125 mcg) Tab Take 5,000 Units by mouth every morning.      B Complex Vitamins (VITAMIN-B COMPLEX PO) Take 1 Tablet by mouth every morning.         Current list of administered Medications:    Current Facility-Administered Medications:     NS infusion 1,000 mL, 1,000 mL, Intravenous, Continuous, Shoaib White M.D., Last Rate: 125 mL/hr at 08/29/24 0931, 1,000 mL at 08/29/24 0931    Past Medical History:   Diagnosis Date    AICD (automatic cardioverter/defibrillator) present 2023    Arrhythmia     Arthritis     Bowel habit changes 06/10/2020    Constipation    Breath shortness     with exertion, been going on for years    CAD (coronary artery disease)     Cataract 2020    IOL bilat    Congestive heart failure (HCC)     Dental abscess 12/03/2022    pt currently on 7 day antibiotic treament 12/3/22, pt has \"deep dental cleaning\" scheduled for 12/7/22.    Deviated septum     DIABETES MELLITUS " "06/19/2020    Insulin    Dizziness     Fatigue     Gout     Heart disease     Hemorrhagic disorder (HCC) 07/13/2022    on anticoagulant    High cholesterol     Hypertension     Migraine aura without headache     Myocardial infarct (HCC) 2007    MI in 2010 and 2020    Other and unspecified angina pectoris     PAD (peripheral artery disease)     Pain     back pain    Psychiatric problem     anxiety and depression    Seizure (HCC)     unsure what kind \"drop attacks\", pt states last one was 12/3/22    Stroke (HCC)     TIAs aura at times       Past Surgical History:   Procedure Laterality Date    CATARACT EXTRACTION WITH IOL Bilateral 2020    RECOVERY  06/25/2015    Procedure: VASCULAR CASE BYNUM-ABDOMINAL AORTOGRAM W/ RUNOFF, POSSIBLE INTERVENTION ICD 9: 433.10, 435.3, 443.9;  Surgeon: [facility];  Location: SURGERY PRE-POST PROC UNIT [facility];  Service:     CAROTID ENDARTERECTOMY Left 05/20/2015    Procedure: CAROTID ENDARTERECTOMY ;  Surgeon: Topher Bynum M.D.;  Location: SURGERY [facility];  Service:     OTHER      AICD    OTHER CARDIAC SURGERY      left, stent placement    STENT PLACEMENT Left     carotid artery    STENT PLACEMENT Right     ICA (internal carotid artery)    STENT PLACEMENT      iliac artery       History reviewed. No pertinent family history.  Patient family history was personally reviewed, no pertinent family history to current presentation    Social History     Tobacco Use    Smoking status: Every Day     Current packs/day: 0.50     Average packs/day: 0.5 packs/day for 51.7 years (25.8 ttl pk-yrs)     Types: Cigarettes     Start date: 1973     Passive exposure: Never    Smokeless tobacco: Former    Tobacco comments:     Pt declines smoking cessation annamaria 8/20/2024   Vaping Use    Vaping status: Never Used   Substance Use Topics    Alcohol use: No     Comment: quit 8 years ago    Drug use: No       ALLERGIES:  Allergies   Allergen Reactions    Ranexa [Ranolazine] Unspecified     " "Dropped heart rate low and worsened lightheadedness.     Statins [Hmg-Coa-R Inhibitors] Unspecified     Muscle aches.       Review of systems:  A complete review of symptoms was reviewed with the patient. This is reviewed in H&P and PMH. ALL OTHERS reviewed and negative    Physical exam:  Patient Vitals for the past 24 hrs:   BP Temp Temp src Pulse Resp SpO2 Height Weight   08/29/24 1033 137/65 -- -- 82 16 99 % -- --   08/29/24 1012 (!) 207/89 -- -- 75 12 99 % -- --   08/29/24 0956 (!) 228/97 -- -- 77 -- -- -- --   08/29/24 0949 (!) 201/93 36.1 °C (97 °F) -- 81 20 99 % -- --   08/29/24 0942 (!) 202/91 -- -- 76 (!) 25 99 % -- --   08/29/24 0936 (!) 174/76 -- -- -- -- 99 % -- --   08/29/24 0816 (!) 174/72 36 °C (96.8 °F) Temporal 73 18 100 % 1.727 m (5' 8\") 73.5 kg (162 lb 0.6 oz)     General: Not in acute distress, lying comfortably in bed  EYES: No jaundice  HEENT: OP clear   Neck:  No carotid bruits, No JVD appreciated  CVS:  RRR, Normal S1, S2. No murmurs, rubs or gallops  Resp: Normal respiratory effort, lungs CTA bilaterally. No rales or rhonchi  Abdomen: Soft, non-distended, non-tender to palpation, no guarding or rigidity  Skin: No obvious rashes, no cyanosis  Neurological: Alert and oriented x3, moves all extremities, no focal neurologic deficits  Psychiatric: Appropriate affect  Extremities:   Extremities warm, 2+ bilateral radial pulses.  2+ bilateral dp pulses, no lower extremity edema bilaterally, no clubbing      Data:  Laboratory studies personally reviewed by me:  Recent Results (from the past 24 hour(s))   CBC Without Differential    Collection Time: 08/29/24  9:17 AM   Result Value Ref Range    WBC 4.3 (L) 4.8 - 10.8 K/uL    RBC 4.91 4.70 - 6.10 M/uL    Hemoglobin 14.5 14.0 - 18.0 g/dL    Hematocrit 44.6 42.0 - 52.0 %    MCV 90.8 81.4 - 97.8 fL    MCH 29.5 27.0 - 33.0 pg    MCHC 32.5 32.3 - 36.5 g/dL    RDW 48.5 35.9 - 50.0 fL    Platelet Count 207 164 - 446 K/uL    MPV 8.8 (L) 9.0 - 12.9 fL   Basic " Metabolic Panel    Collection Time: 08/29/24  9:17 AM   Result Value Ref Range    Sodium 140 135 - 145 mmol/L    Potassium 4.1 3.6 - 5.5 mmol/L    Chloride 108 96 - 112 mmol/L    Co2 21 20 - 33 mmol/L    Glucose 87 65 - 99 mg/dL    Bun 20 8 - 22 mg/dL    Creatinine 0.93 0.50 - 1.40 mg/dL    Calcium 9.9 8.5 - 10.5 mg/dL    Anion Gap 11.0 7.0 - 16.0   Prothrombin Time (INR) (plasma)    Collection Time: 08/29/24  9:17 AM   Result Value Ref Range    PT 14.1 12.0 - 14.6 sec    INR 1.08 0.87 - 1.13   TROPONIN    Collection Time: 08/29/24  9:17 AM   Result Value Ref Range    Troponin T 11 6 - 19 ng/L   ESTIMATED GFR    Collection Time: 08/29/24  9:17 AM   Result Value Ref Range    GFR (CKD-EPI) 90 >60 mL/min/1.73 m 2   POCT glucose device results    Collection Time: 08/29/24  9:20 AM   Result Value Ref Range    POC Glucose, Blood 95 65 - 99 mg/dL   POCT CO2 device results    Collection Time: 08/29/24  9:25 AM   Result Value Ref Range    Istat CO2 23 20 - 33 mmol/L   ISTAT GLUCOSE    Collection Time: 08/29/24  9:25 AM   Result Value Ref Range    Istat Glucose 85 65 - 99 mg/dL   POCT BUN device results    Collection Time: 08/29/24  9:25 AM   Result Value Ref Range    Istat Bun 22 8 - 22 mg/dL   POCT creatinine device results    Collection Time: 08/29/24  9:25 AM   Result Value Ref Range    Istat Creatinine 1.0 0.5 - 1.4 mg/dL   POCT sodium device results    Collection Time: 08/29/24  9:25 AM   Result Value Ref Range    Istat Sodium 145 135 - 145 mmol/L   POCT potassium device results    Collection Time: 08/29/24  9:25 AM   Result Value Ref Range    Istat Potassium 4.1 3.6 - 5.5 mmol/L   POCT ionized CA device results    Collection Time: 08/29/24  9:25 AM   Result Value Ref Range    Istat Ionized Calcium 1.28 1.10 - 1.30 mmol/L   POCT chloride device results    Collection Time: 08/29/24  9:25 AM   Result Value Ref Range    Istat Chloride 108 96 - 112 mmol/L   EKG    Collection Time: 08/29/24  9:37 AM   Result Value Ref Range     Report       [facility]    Test Date:  2024  Pt Name:    SONAL BOND            Department: [facility]  MRN:        [id number]                      Room:       [facility]  Gender:     Male                         Technician: SOFIE  :        1960                   Requested By:LORENZO MOORE  Order #:    [id number]                    Reading MD:    Measurements  Intervals                                Axis  Rate:       74                           P:          34  UT:         196                          QRS:        35  QRSD:       122                          T:          40  QT:         401  QTc:        445    Interpretive Statements  Sinus rhythm  Nonspecific intraventricular conduction delay  Borderline ST elevation, anterior leads  Baseline wander in lead(s) V2  Compared to ECG 2022 11:19:55  No significant changes         Imaging:  DX-CHEST-PORTABLE (1 VIEW)   Final Result      No acute cardiac or pulmonary abnormalities are identified.      IR-CERV CAROTID STENT WITH PROTECTION    (Results Pending)           EKG tracings dated 2024 personally reviewed by me shows sinus rhythm, nonspecific ST-T wave changes, no ischemic changes.  Similar to prior EKG    Cardiac Cath (Jairo Salter) 2024:  s/p [facility] coronary artery angiography and LVgram with successful laser   atherectomy, cutting balloon and IVUS guided DCB (drug coated balloon) of   the LAD via right ulnar arterial approach. Ultrasound was used for access.   EF ~35%. Normal LVEDP.  Symptoms of dizziness corresponding with PVC upon crossing of AV.   Right dominant coronary anatomy.   Patent mid RCA stent.   Patent circumflex stents with smaller distal circumflex stent which is   unchanged from previous.   Severe mid to distal LAD ISR.   s/p successful laser atherectomy, cutting balloon and IVUS guided DCB   (drug coated balloon) of the LAD without complication.   Recommend continued goal directed medical therapy and risk  factor   modification.     Echo 6/25/2024:  There is moderate global hypokinesis of the left ventricle.   The Ejection Fraction estimate is 30-35%     There is a pacemaker lead in the right ventricle.   The right ventricular systolic function is borderline reduced. TAPSE 1.6 cm.     There is mild mitral regurgitation   RAP 3 mmHg   There is no pericardial effusion.     All pertinent features of laboratory and imaging reviewed including primary images where applicable      Active Problems:    * No active hospital problems. *  Resolved Problems:    * No resolved hospital problems. *      Assessment / Plan:  Chest pain - due to poorly controlled blood pressure  Hypertensive Urgency  Severe multiple CAD - s/p multiple stents to LAD, LCX, RCA complicated by ISR w/recent PCI to LAD ISR  Severe PAD  Carotid stenosis  Ischemic cardiomyopathy, HFrEF EF 30-35%  History of NSVT s/p ICD  Dyslipidemia    Recommendations:  Chest pain symptoms occurred in the setting of markedly elevated blood pressure.  Systolic blood pressure of 220 mmHg.  Blood pressure improved with resolution of chest pain.  His EKG has been reviewed which shows no ischemic changes.  In addition, his troponin is within normal limits.  No evidence of acute coronary syndrome.  Recommend treating underlying blood pressure.  Can give IV hydralazine 20mg x 1 for BP control.  Review of recent cardiac cath report from 7/29/2024 shows patent RCA and LCX stents and LAD ISR that was successfully intervened on. In addition, he is euvolemic without evidence of heart failure/acute decompensation.  At this time okay to proceed with carotid procedure only IF blood pressure controlled and without recurrence of symptoms. Would aim for -160 mmHg.  Continue DAPT with aspirin and prasugrel  Not on BB due to intolerance      I personally discussed his case with  Dr Shoaib White M.D., rapid response team      It is my pleasure to participate in the care of .  Ayah.  Please do not hesitate to contact me with questions or concerns.    Reagan Malone MD  Cardiologist, [facility]    8/29/2024    Please note that this dictation was created using voice recognition software. I have made every reasonable attempt to correct obvious errors, but it is possible there are errors of grammar and possibly content that I did not discover before finalizing the note.

## 2024-08-29 NOTE — CODE DOCUMENTATION
Dr. Malone at bedside, plan of care discussed, results reviewed. Per MD give 20 mg IV hydralazine now to try to get SBP <160.

## 2024-08-29 NOTE — PROGRESS NOTES
Pt presents to IR-2.   Pt was consented by MD at bedside in [facility] Pre-op, confirmed by this RN.   Pt was RRT for chest pain this morning, card's bedside, see eMar and epic for notes.   Pt brought to IR this afternoon after stabilizing BP for SBP goal <160.  Pt c/o not feeling well and BG checked, BG 69, MD notified, dextrose given (see eMar), repeat BG done 15min later, see results in chart.  Pt transferred to IR table in supine position.   Pt placed on monitor, prepped and draped in a sterile fashion.   Patient underwent a cerebral angiogram, no interventions needed, by Dr. White. Procedure site was marked by MD and verified using imaging guidance.  Vitals were taken every 5 minutes and remained stable during procedure (see doc flow sheet for results). CO2 waveform capnography was monitored and remained WNL throughout procedure.   Report called to Gabriela GRAY. Pt transported by stretcher with RN to T-PACU 3A.     Left femoral Angio-Seal (6Fr)  Terumo Angio-Seal vascular closure device  Deployed @ 1611  REF: [id number]  LOT: [id number]  EXP: 2025-03-23

## 2024-08-29 NOTE — OR NURSING
1631: To PACU from IR via monika, awake/alert, denies pain/nausea, respirations spontaneous and non-labored. Scant bloody drainage to L groin dressing, area soft to touch. Pt states he has some peripheral neuropathy but bilat foot sensation is at his baseline.Aware of need to stay flat and bedrest for 2 hours, requesting coffee and snacks during his stay.  1645: no change  1654: FSBS 122  1700: taking pudding per pt request. Icepack over groin for minimally increased drainage. Area remains soft.  1715: No change  1721: commenced po coffee, pt reminded to sip with head flat.  1730: No change.  1800: No change. No increase in drainage to dressing, area remains soft.  1830: Assisted with urinal for c/o urge to void. No change in L groin assessment   1831: Meets criteria to transfer to Stage 2. Commenced gradually raising HOB.  1845: L groin dressing changed prior to transfer

## 2024-08-29 NOTE — OR SURGEON
Immediate Post- Operative Note        Findings: Atherosclerotic disease and stents       Procedure(s): Diagnostic cervical and cerebral angiogram     No flow limiting significant stenosis that would require stents.      Estimated Blood Loss: Less than 5 ml        Complications: None            8/29/2024     4:18 PM     Shoaib White M.D.

## 2024-08-29 NOTE — H&P
"History and Physical    Date: 8/29/2024    PCP: Pcp Pt States None          HPI: This is a 64 y.o. male who is presenting with vertebral and carotid stenosis /stent placement     Past Medical History:   Diagnosis Date    AICD (automatic cardioverter/defibrillator) present 2023    Arrhythmia     Arthritis     Bowel habit changes 06/10/2020    Constipation    Breath shortness     with exertion, been going on for years    CAD (coronary artery disease)     Cataract 2020    IOL bilat    Congestive heart failure (HCC)     Dental abscess 12/03/2022    pt currently on 7 day antibiotic treament 12/3/22, pt has \"deep dental cleaning\" scheduled for 12/7/22.    Deviated septum     DIABETES MELLITUS 06/19/2020    Insulin    Dizziness     Fatigue     Gout     Heart disease     Hemorrhagic disorder (HCC) 07/13/2022    on anticoagulant    High cholesterol     Hypertension     Migraine aura without headache     Myocardial infarct (HCC) 2007    MI in 2010 and 2020    Other and unspecified angina pectoris     PAD (peripheral artery disease)     Pain     back pain    Psychiatric problem     anxiety and depression    Seizure (HCC)     unsure what kind \"drop attacks\", pt states last one was 12/3/22    Stroke (HCC)     TIAs aura at times       Past Surgical History:   Procedure Laterality Date    CATARACT EXTRACTION WITH IOL Bilateral 2020    RECOVERY  06/25/2015    Procedure: VASCULAR CASE BYNUM-ABDOMINAL AORTOGRAM W/ RUNOFF, POSSIBLE INTERVENTION ICD 9: 433.10, 435.3, 443.9;  Surgeon: [facility];  Location: SURGERY PRE-POST PROC UNIT [facility] [place];  Service:     CAROTID ENDARTERECTOMY Left 05/20/2015    Procedure: CAROTID ENDARTERECTOMY ;  Surgeon: Topher Bynum M.D.;  Location: SURGERY [facility];  Service:     OTHER      AICD    OTHER CARDIAC SURGERY      left, stent placement    STENT PLACEMENT Left     carotid artery    STENT PLACEMENT Right     ICA (internal carotid artery)    STENT PLACEMENT      iliac artery       Current " Facility-Administered Medications   Medication Dose Route Frequency Provider Last Rate Last Admin    NS infusion 1,000 mL  1,000 mL Intravenous Continuous Shoaib White M.D. 125 mL/hr at 08/29/24 0931 1,000 mL at 08/29/24 0931    nitroglycerin (Nitrostat) tablet 0.4 mg  0.4 mg Sublingual Once Shoaib White M.D.            Social History     Socioeconomic History    Marital status:      Spouse name: Not on file    Number of children: Not on file    Years of education: Not on file    Highest education level: Not on file   Occupational History    Not on file   Tobacco Use    Smoking status: Every Day     Current packs/day: 0.50     Average packs/day: 0.5 packs/day for 51.7 years (25.8 ttl pk-yrs)     Types: Cigarettes     Start date: 1973     Passive exposure: Never    Smokeless tobacco: Former    Tobacco comments:     Pt declines smoking cessation flier 8/20/2024   Vaping Use    Vaping status: Never Used   Substance and Sexual Activity    Alcohol use: No     Comment: quit 8 years ago    Drug use: No    Sexual activity: Not on file   Other Topics Concern    Not on file   Social History Narrative    Not on file     Social Determinants of Health     Financial Resource Strain: Not on file   Food Insecurity: No Food Insecurity (5/18/2023)    [facility]    Hunger Vital Sign     Worried About Running Out of Food in the Last Year: Never true     Ran Out of Food in the Last Year: Never true   Transportation Needs: No Transportation Needs (5/18/2023)    [facility]    PRAPARE - Transportation     Lack of Transportation (Medical): No     Lack of Transportation (Non-Medical): No   Physical Activity: Not on file   Stress: Not on file   Social Connections: Not on file   Intimate Partner Violence: Low Risk  (6/24/2024)    [facility]    History of Abuse      Have you ever been afraid of, threatened, neglected, or abused by someone?: No   Housing Stability: Unknown (5/18/2023)    [facility]    Housing Stability Vital Sign     Unable to Pay for Housing in the Last Year: No     Number of Places Lived in the Last Year: Not on file     Unstable Housing in the Last Year: No       History reviewed. No pertinent family history.    Allergies:  Ranexa [ranolazine] and Statins [hmg-coa-r inhibitors]    Review of Systems:  Chest pain     Physical Exam  Constitutional:       General: He is not in acute distress.     Appearance: He is well-developed. He is not diaphoretic.   Eyes:      General: No scleral icterus.  Pulmonary:      Effort: Pulmonary effort is normal. No respiratory distress.   Abdominal:      General: There is no distension.      Palpations: Abdomen is soft.   Skin:     General: Skin is warm and dry.      Coloration: Skin is not pale.      Findings: No erythema or rash.   Neurological:      Mental Status: He is alert and oriented to person, place, and time.      Cranial Nerves: No cranial nerve deficit.      Coordination: Coordination normal.   Psychiatric:         Behavior: Behavior normal.         Thought Content: Thought content normal.         Judgment: Judgment normal.         Vital Signs  Blood Pressure : (!) 201/93   Temperature: 36.1 °C (97 °F)   Pulse: 81   Respiration: 20   Pulse Oximetry: 99 %        Labs:  Recent Labs     08/29/24  0917   WBC 4.3*   RBC 4.91   HEMOGLOBIN 14.5   HEMATOCRIT 44.6   MCV 90.8   MCH 29.5   MCHC 32.5   RDW 48.5   PLATELETCT 207   MPV 8.8*         Recent Labs     08/29/24  0917   INR 1.08     Recent Labs     08/29/24  0917   INR 1.08       Radiology:  DX-CHEST-PORTABLE (1 VIEW)    (Results Pending)   IR-CERV CAROTID STENT WITH PROTECTION    (Results Pending)             Assessment and Plan:This is a 64 y.o. with vertebral and carotid stent and stenosis.    C/o chest  pain.will evaluvate for any cardiac event.If negative we will proceed with  Cerebral Angiogram and possible angioplasty.                   Planned Right Revision total Hip replacement with Dr. Stinson on 10/12/2023.

## 2024-08-29 NOTE — CODE DOCUMENTATION
"Pt here for scheduled R carotid artery and R vertebral artery angio with Dr. White with ICU admission afterwards.    Pt c/o chest pain that he has had intermittently for 4-5 days that feels like \"pressure\".    Labs ordered and CXR per protocol for chest pain.    MD at bedside speaking with patient.     Pt also states he is a bit anxious about procedure.  "

## 2024-08-29 NOTE — OR NURSING
Pt c/o chest pain. RR team called. EKG results in chart. Dr. Seo at bedside. Pending cardiology consult. PIV in place, labs drawn and sent to lab.

## 2024-08-30 NOTE — DISCHARGE INSTRUCTIONS
HOME CARE INSTRUCTIONS    ACTIVITY: Rest and take it easy for the first 24 hours.  A responsible adult is recommended to remain with you during that time.  It is normal to feel sleepy.  We encourage you to not do anything that requires balance, judgment or coordination.    FOR 24 HOURS DO NOT:  Drive, operate machinery or run household appliances.  Drink beer or alcoholic beverages.  Make important decisions or sign legal documents.    SPECIAL INSTRUCTIONS:   Angiogram, Care After  This sheet gives you information about how to care for yourself after your procedure. Your doctor may also give you more specific instructions. If you have problems or questions, contact your doctor.  What can I expect after the procedure?  After the procedure, it is common to have these problems at the point where the catheter was inserted:  Bruising.  Tenderness.  A collection of blood (hematoma). This may feel like a small lump under the skin at the insertion site.  Follow these instructions at home:  Insertion site care    Follow instructions from your doctor about how to take care of the area where the catheter was inserted. Make sure you:  Wash your hands with soap and water before you change your bandage (dressing). If you cannot use soap and water, use hand .  Change your bandage as told by your doctor.  Do not take baths, swim, or use a hot tub until your doctor says it is okay.  You may shower 24-48 hours after the procedure, or as told by your doctor. To clean the area:  Gently wash the area with plain soap and water.  Pat the area dry with a clean towel.  Do not rub the area. This may cause bleeding.  Check your insertion area every day for signs of infection. Check for:  Redness, swelling, or pain.  Fluid or blood.  Warmth.  Pus or a bad smell.  Do not apply powder or lotion to the area. Keep the area clean and dry.  Activity  Do not drive for 24 hours if you were given a medicine to help you relax (sedative).  Rest  "as told by your doctor, usually for 1-2 days.  Do not lift anything that is heavier than 10 lbs. (4.5 kg) or as told by your doctor.  If your insertion site was in your leg, try to avoid stairs for a few days.  Return to your normal activities as told by your doctor. Ask your doctor what activities are safe for you.  General instructions    If the insertion area starts to bleed, lie flat and put pressure on the area. If the bleeding does not stop, get help right away. This is an emergency.  Take over-the-counter and prescription medicines only as told by your doctor.  Drink enough fluid to keep your pee (urine) pale yellow.  Keep all follow-up visits as told by your doctor. This is important.  Contact a doctor if:  You have a fever.  You have chills.  You have redness, swelling, or pain around your insertion area.  You have fluid or blood coming from your insertion area.  The insertion area feels warm to the touch.  You have pus or a bad smell coming from your insertion area.  You have more bruising around the insertion area.  Get help right away if:  You have a lot of pain in the insertion area.  The insertion area swells very fast.  The insertion area is bleeding, and the bleeding does not stop after you hold steady pressure on the area.  The area around the insertion area becomes pale, cool, tingly, or numb.  You have chest pain.  You have trouble breathing.  You have a rash.  You have any signs of a stroke. \"BE FAST\" is an easy way to remember the main warning signs:  B - Balance. Signs are dizziness, sudden trouble walking, or loss of balance.  E - Eyes. Signs are trouble seeing or a change in how you see.  F - Face. Signs are sudden weakness or loss of feeling of the face, or the face or eyelid drooping on one side.  A - Arms. Signs are weakness or loss of feeling in an arm. This happens suddenly and usually on one side of the body.  S - Speech. Signs are sudden trouble speaking, slurred speech, or trouble " understanding what people say.  T - Time. Time to call emergency services. Write down what time symptoms started.  You have other signs of a stroke, such as:  A sudden, very bad headache with no known cause.  Feeling like you may vomit (nausea).  Vomiting.  A seizure.  These symptoms may be an emergency. Do not wait to see if the symptoms will go away. Get medical help right away. Call your local emergency services (911 in the [place] Do not drive yourself to the hospital.  Summary  After the procedure, it is common to have bruising and tenderness at the insertion area.  Do not take baths, swim, or use a hot tub until your doctor says it is okay to do so. You may shower 24-48 hours after the procedure or as told by your doctor.  It is important to rest and drink plenty of fluids.  If the insertion area starts to bleed, lie flat and put pressure on the area. If the bleeding does not stop, get help right away. This is an emergency.    DIET: To avoid nausea, slowly advance diet as tolerated, avoiding spicy or greasy foods for the first day.  Add more substantial food to your diet according to your physician's instructions.  Babies can be fed formula or breast milk as soon as they are hungry.  INCREASE FLUIDS AND FIBER TO AVOID CONSTIPATION.    SURGICAL DRESSING/BATHING: keep dressing for 48 hours clean dry and intact.     MEDICATIONS: Resume taking daily medication.  Take prescribed pain medication with food.  If no medication is prescribed, you may take non-aspirin pain medication if needed.  PAIN MEDICATION CAN BE VERY CONSTIPATING.  Take a stool softener or laxative such as senokot, pericolace, or milk of magnesia if needed.    Prescription given for none .      A follow-up appointment should be arranged with your doctor in  [phone] ; call to schedule.    You should CALL YOUR PHYSICIAN if you develop:  Fever greater than 101 degrees F.  Pain not relieved by medication, or persistent nausea or  vomiting.  Excessive bleeding (blood soaking through dressing) or unexpected drainage from the wound.  Extreme redness or swelling around the incision site, drainage of pus or foul smelling drainage.  Inability to urinate or empty your bladder within 8 hours.  Problems with breathing or chest pain.    You should call 911 if you develop problems with breathing or chest pain.  If you are unable to contact your doctor or surgical center, you should go to the nearest emergency room or urgent care center.  Physician's telephone #:  [phone]     MILD FLU-LIKE SYMPTOMS ARE NORMAL.  YOU MAY EXPERIENCE GENERALIZED MUSCLE ACHES, THROAT IRRITATION, HEADACHE AND/OR SOME NAUSEA.    If any questions arise, call your doctor.  If your doctor is not available, please feel free to call the Surgical Center at [phone].  The Center is open Monday through Friday from 7AM to 7PM.      A registered nurse may call you a few days after your surgery to see how you are doing after your procedure.    You may also receive a survey in the mail within the next two weeks and we ask that you take a few moments to complete the survey and return it to us.  Our goal is to provide you with very good care and we value your comments.     Depression / Suicide Risk    As you are discharged from this [facility], it is important to learn how to keep safe from harming yourself.    Recognize the warning signs:  Abrupt changes in personality, positive or negative- including increase in energy   Giving away possessions  Change in eating patterns- significant weight changes-  positive or negative  Change in sleeping patterns- unable to sleep or sleeping all the time   Unwillingness or inability to communicate  Depression  Unusual sadness, discouragement and loneliness  Talk of wanting to die  Neglect of personal appearance   Rebelliousness- reckless behavior  Withdrawal from people/activities they love  Confusion- inability to  concentrate     If you or a loved one observes any of these behaviors or has concerns about self-harm, here's what you can do:  Talk about it- your feelings and reasons for harming yourself  Remove any means that you might use to hurt yourself (examples: pills, rope, extension cords, firearm)  Get professional help from the community (Mental Health, Substance Abuse, psychological counseling)  Do not be alone:Call your Safe Contact- someone whom you trust who will be there for you.  Call your local CRISIS HOTLINE 521-0844 or 391-920-5737  Call your local Children's Mobile Crisis Response Team [place] (425) 100-5859 or www.NuoDB  Call the toll free National Suicide Prevention Hotlines   National Suicide Prevention Lifeline 404-207-VSEE (7723)  National Hope Line Network 800-SUICIDE (297-2675)    I acknowledge receipt and understanding of these Home Care instructions.

## 2024-08-30 NOTE — OR NURSING
Patient is alert and oriented x 4 denies any stroke like sx, (+) lightheaded, denies any pain. Wife at bedside . Ambulated and left groin dressing is clean dry and intact soft to the touch. Patient stated he does not want to go over the discharge instructions since it is his 3rd time  to have angiogram .  Wheeled down to the parking , discharge home.

## 2024-09-03 NOTE — PROGRESS NOTES
Conventional angiography reviewed with Dr. White, no intervention performed as stents were patent with non significant stenosis. He rec continued f/u with neurology and referral to [facility] for neurovascular stenosis intervention as indicated.

## 2024-11-15 NOTE — PROGRESS NOTES
"Patient contacted APRN with multiple complaints of dizziness with head movement. He feels like this is worse than his usual symptoms. He is taking ticagrelor daily and aspirin every other day, dose reduced due to atraumatic cutaneous bleeding. Continues to smoke a pipe. Feels like he has \"real bad ivette\" in his head. Hx of in stent stenosis after 2 weeks for iliac stent placement. Asking for an ultrasound to eval his MONSERRAT.     Will order USD as requested. Pt requesting to be done through [facility]. Will send to [facility] to forward, will ask facility to call pt. Has a [place] neurologist, will request cc report to Dr. Valle. Pt in agreement with plan. He will notify us once his scan is scheduled.   "
for proper hand placement/verbal cues

## 2025-03-05 ENCOUNTER — APPOINTMENT (OUTPATIENT)
Dept: URBAN - METROPOLITAN AREA CLINIC 31 | Facility: CLINIC | Age: 65
Setting detail: DERMATOLOGY
End: 2025-03-05

## 2025-03-05 DIAGNOSIS — L98419 CHRONIC ULCER OF OTHER SPECIFIED SITES: ICD-10-CM

## 2025-03-05 DIAGNOSIS — L98429 CHRONIC ULCER OF OTHER SPECIFIED SITES: ICD-10-CM

## 2025-03-05 PROBLEM — L97.429 NON-PRESSURE CHRONIC ULCER OF LEFT HEEL AND MIDFOOT WITH UNSPECIFIED SEVERITY: Status: ACTIVE | Noted: 2025-03-05

## 2025-03-05 PROBLEM — L97.529 NON-PRESSURE CHRONIC ULCER OF OTHER PART OF LEFT FOOT WITH UNSPECIFIED SEVERITY: Status: ACTIVE | Noted: 2025-03-05

## 2025-03-05 PROCEDURE — ? COUNSELING

## 2025-03-05 PROCEDURE — ? RECOMMENDATIONS

## 2025-03-05 PROCEDURE — 99203 OFFICE O/P NEW LOW 30 MIN: CPT

## 2025-03-05 PROCEDURE — ? ADDITIONAL NOTES

## 2025-03-05 ASSESSMENT — LOCATION DETAILED DESCRIPTION DERM
LOCATION DETAILED: LEFT HEEL
LOCATION DETAILED: LEFT LATERAL GREAT TOE
LOCATION DETAILED: LEFT DORSAL GREAT TOE

## 2025-03-05 ASSESSMENT — LOCATION ZONE DERM
LOCATION ZONE: TOE
LOCATION ZONE: FEET

## 2025-03-05 ASSESSMENT — LOCATION SIMPLE DESCRIPTION DERM
LOCATION SIMPLE: LEFT GREAT TOE
LOCATION SIMPLE: LEFT HEEL

## 2025-03-05 NOTE — PROCEDURE: ADDITIONAL NOTES
Additional Notes: 3.5.25\\nPT is using MediHoney to help treat his foot. It usually takes 6-8 weeks for healing followed by re-evaluation/follow-up. Recommendations include compression socks, vaseline/aquaphor, and medihoney.\\n\\nPatient reports significant improvement since starting the medication-honey about a week ago. Discussed biopsy vs observation. Patient prefers to defer biopsy, due to reported significant improvement with better wound care.\\n\\nAlso seeing/being referred to vascular specialist and will continue that process with pcp.
Render Risk Assessment In Note?: no
Detail Level: Simple

## 2025-03-05 NOTE — HPI: SKIN LESION
Is This A New Presentation, Or A Follow-Up?: Skin Lesions
What Type Of Note Output Would You Prefer (Optional)?: Standard Output
How Severe Is Your Skin Lesion?: moderate
Has Your Skin Lesion Been Treated?: not been treated
Additional History: Ortho took xrays and recommended MediHoney.